# Patient Record
Sex: MALE | Race: ASIAN | NOT HISPANIC OR LATINO | Employment: OTHER | URBAN - METROPOLITAN AREA
[De-identification: names, ages, dates, MRNs, and addresses within clinical notes are randomized per-mention and may not be internally consistent; named-entity substitution may affect disease eponyms.]

---

## 2019-11-12 ENCOUNTER — TRANSFERRED RECORDS (OUTPATIENT)
Dept: HEALTH INFORMATION MANAGEMENT | Facility: CLINIC | Age: 59
End: 2019-11-12

## 2020-02-03 ENCOUNTER — TRANSCRIBE ORDERS (OUTPATIENT)
Dept: OTHER | Age: 60
End: 2020-02-03

## 2020-02-03 DIAGNOSIS — G62.9 POLYNEUROPATHY: Primary | ICD-10-CM

## 2020-02-08 ENCOUNTER — HEALTH MAINTENANCE LETTER (OUTPATIENT)
Age: 60
End: 2020-02-08

## 2020-02-28 ENCOUNTER — OFFICE VISIT (OUTPATIENT)
Dept: NEUROLOGY | Facility: CLINIC | Age: 60
End: 2020-02-28
Payer: COMMERCIAL

## 2020-02-28 VITALS
HEART RATE: 61 BPM | WEIGHT: 170 LBS | TEMPERATURE: 97.9 F | SYSTOLIC BLOOD PRESSURE: 106 MMHG | DIASTOLIC BLOOD PRESSURE: 60 MMHG | OXYGEN SATURATION: 98 %

## 2020-02-28 DIAGNOSIS — G95.9 CERVICAL MYELOPATHY (H): ICD-10-CM

## 2020-02-28 DIAGNOSIS — G60.8 SENSORY POLYNEUROPATHY: Primary | ICD-10-CM

## 2020-02-28 DIAGNOSIS — R73.03 PREDIABETES: ICD-10-CM

## 2020-02-28 DIAGNOSIS — Z98.890 HISTORY OF CERVICAL SPINAL SURGERY: ICD-10-CM

## 2020-02-28 LAB
ERYTHROCYTE [SEDIMENTATION RATE] IN BLOOD BY WESTERGREN METHOD: 8 MM/H (ref 0–20)
HBA1C MFR BLD: 5.6 % (ref 0–5.6)
VIT B12 SERPL-MCNC: 363 PG/ML (ref 193–986)

## 2020-02-28 PROCEDURE — 84425 ASSAY OF VITAMIN B-1: CPT | Mod: 90 | Performed by: PSYCHIATRY & NEUROLOGY

## 2020-02-28 PROCEDURE — 36415 COLL VENOUS BLD VENIPUNCTURE: CPT | Performed by: PSYCHIATRY & NEUROLOGY

## 2020-02-28 PROCEDURE — 82607 VITAMIN B-12: CPT | Performed by: PSYCHIATRY & NEUROLOGY

## 2020-02-28 PROCEDURE — 85652 RBC SED RATE AUTOMATED: CPT | Performed by: PSYCHIATRY & NEUROLOGY

## 2020-02-28 PROCEDURE — 86618 LYME DISEASE ANTIBODY: CPT | Performed by: PSYCHIATRY & NEUROLOGY

## 2020-02-28 PROCEDURE — 99000 SPECIMEN HANDLING OFFICE-LAB: CPT | Performed by: PSYCHIATRY & NEUROLOGY

## 2020-02-28 PROCEDURE — 99205 OFFICE O/P NEW HI 60 MIN: CPT | Performed by: PSYCHIATRY & NEUROLOGY

## 2020-02-28 PROCEDURE — 83921 ORGANIC ACID SINGLE QUANT: CPT | Performed by: PSYCHIATRY & NEUROLOGY

## 2020-02-28 PROCEDURE — 86038 ANTINUCLEAR ANTIBODIES: CPT | Performed by: PSYCHIATRY & NEUROLOGY

## 2020-02-28 PROCEDURE — 00000402 ZZHCL STATISTIC TOTAL PROTEIN: Performed by: PSYCHIATRY & NEUROLOGY

## 2020-02-28 PROCEDURE — 84207 ASSAY OF VITAMIN B-6: CPT | Mod: 90 | Performed by: PSYCHIATRY & NEUROLOGY

## 2020-02-28 PROCEDURE — 83036 HEMOGLOBIN GLYCOSYLATED A1C: CPT | Performed by: PSYCHIATRY & NEUROLOGY

## 2020-02-28 PROCEDURE — 84165 PROTEIN E-PHORESIS SERUM: CPT | Performed by: PSYCHIATRY & NEUROLOGY

## 2020-02-28 RX ORDER — ATENOLOL 25 MG/1
25 TABLET ORAL DAILY
COMMUNITY
Start: 2019-12-26

## 2020-02-28 RX ORDER — LISINOPRIL 10 MG/1
TABLET ORAL
COMMUNITY
Start: 2020-02-19 | End: 2022-07-23

## 2020-02-28 RX ORDER — ACETAMINOPHEN 500 MG
1000 TABLET ORAL EVERY 6 HOURS PRN
COMMUNITY
Start: 2018-12-14

## 2020-02-28 RX ORDER — SILDENAFIL 100 MG/1
TABLET, FILM COATED ORAL
COMMUNITY
Start: 2019-09-20 | End: 2022-07-23

## 2020-02-28 RX ORDER — DIVALPROEX SODIUM 250 MG/1
TABLET, EXTENDED RELEASE ORAL
COMMUNITY
Start: 2020-01-05 | End: 2022-07-23

## 2020-02-28 RX ORDER — ALLOPURINOL 300 MG/1
300 TABLET ORAL DAILY
COMMUNITY
Start: 2019-12-24

## 2020-02-28 RX ORDER — CHLORAL HYDRATE 500 MG
2 CAPSULE ORAL DAILY
COMMUNITY
Start: 2018-10-29

## 2020-02-28 RX ORDER — METHOCARBAMOL 500 MG/1
1000 TABLET, FILM COATED ORAL 2 TIMES DAILY
COMMUNITY
Start: 2020-02-12

## 2020-02-28 RX ORDER — TRAMADOL HYDROCHLORIDE 50 MG/1
100 TABLET ORAL EVERY MORNING
COMMUNITY
Start: 2020-02-18

## 2020-02-28 RX ORDER — PRAMIPEXOLE DIHYDROCHLORIDE 0.25 MG/1
0.5 TABLET ORAL 2 TIMES DAILY
COMMUNITY
Start: 2020-02-14

## 2020-02-28 RX ORDER — GABAPENTIN 300 MG/1
1200 CAPSULE ORAL EVERY MORNING
COMMUNITY
Start: 2020-02-12

## 2020-02-28 SDOH — HEALTH STABILITY: MENTAL HEALTH: HOW OFTEN DO YOU HAVE A DRINK CONTAINING ALCOHOL?: NEVER
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
## 2020-02-28 NOTE — PATIENT INSTRUCTIONS
AFTER VISIT SUMMARY (AVS):    At today's visit we thoroughly discussed current symptoms, EMG results (polyneuropathy), additional evaluation, and the plan, which includes:  Orders Placed This Encounter   Procedures     Lyme Disease Ashley with reflex to WB Serum     Vitamin B12     Vitamin B1 whole blood     Vitamin B6     Methylmalonic Acid     Anti Nuclear Ashley IgG by IFA with Reflex     Hemoglobin A1c     Erythrocyte sedimentation rate auto     Protein electrophoresis     The gabapentin dose could be further increased under the guidance of your pain clinic provider if felt necessary.     Additional recommendations after the work-up.    Next follow-up appointment is on as needed basis.    Please do not hesitate to call me with any questions or concerns.    Thanks.
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
## 2020-03-02 LAB
ALBUMIN SERPL ELPH-MCNC: 4.5 G/DL (ref 3.7–5.1)
ALPHA1 GLOB SERPL ELPH-MCNC: 0.3 G/DL (ref 0.2–0.4)
ALPHA2 GLOB SERPL ELPH-MCNC: 0.7 G/DL (ref 0.5–0.9)
ANA SER QL IF: NEGATIVE
B BURGDOR IGG+IGM SER QL: 0.08 (ref 0–0.89)
B-GLOBULIN SERPL ELPH-MCNC: 1.1 G/DL (ref 0.6–1)
GAMMA GLOB SERPL ELPH-MCNC: 1.3 G/DL (ref 0.7–1.6)
M PROTEIN SERPL ELPH-MCNC: 0 G/DL
PROT PATTERN SERPL ELPH-IMP: ABNORMAL
VIT B6 SERPL-MCNC: 123 NMOL/L (ref 20–125)

## 2020-03-03 LAB — VIT B1 BLD-MCNC: 173 NMOL/L (ref 70–180)

## 2020-03-05 LAB — METHYLMALONATE SERPL-SCNC: 0.46 UMOL/L (ref 0–0.4)

## 2020-11-07 ENCOUNTER — HEALTH MAINTENANCE LETTER (OUTPATIENT)
Age: 60
End: 2020-11-07

## 2021-03-27 ENCOUNTER — HEALTH MAINTENANCE LETTER (OUTPATIENT)
Age: 61
End: 2021-03-27

## 2021-05-31 ENCOUNTER — RECORDS - HEALTHEAST (OUTPATIENT)
Dept: ADMINISTRATIVE | Facility: CLINIC | Age: 61
End: 2021-05-31

## 2021-06-02 ENCOUNTER — RECORDS - HEALTHEAST (OUTPATIENT)
Dept: ADMINISTRATIVE | Facility: CLINIC | Age: 61
End: 2021-06-02

## 2021-06-16 PROBLEM — F32.A DEPRESSION, UNSPECIFIED DEPRESSION TYPE: Status: ACTIVE | Noted: 2019-08-07

## 2021-09-05 ENCOUNTER — HEALTH MAINTENANCE LETTER (OUTPATIENT)
Age: 61
End: 2021-09-05

## 2022-04-17 ENCOUNTER — HEALTH MAINTENANCE LETTER (OUTPATIENT)
Age: 62
End: 2022-04-17

## 2022-06-04 ENCOUNTER — NURSE TRIAGE (OUTPATIENT)
Dept: NURSING | Facility: CLINIC | Age: 62
End: 2022-06-04
Payer: COMMERCIAL

## 2022-06-04 NOTE — TELEPHONE ENCOUNTER
Pt was seen at ED for Priapism and states that he was given an injection    Pt states that he is still having an erection and that he is in a lot of pain     Per protocol - Go to ED now     Care advice given per protocol and when to call back. Pt verbalized understanding and agrees to plan of care.    Tracee Tam RN  [place] Nurse Advisor  3:44 AM 6/4/2022      COVID 19 Nurse Triage Plan/Patient Instructions    Please be aware that novel coronavirus (COVID-19) may be circulating in the community. If you develop symptoms such as fever, cough, or SOB or if you have concerns about the presence of another infection including coronavirus (COVID-19), please contact your health care provider or visit [email or web address].     Disposition/Instructions    ED Visit recommended. Follow protocol based instructions.     Bring Your Own Device:  Please also bring your smart device(s) (smart phones, tablets, laptops) and their charging cables for your personal use and to communicate with your care team during your visit.    Thank you for taking steps to prevent the spread of this virus.  o Limit your contact with others.  o Wear a simple mask to cover your cough.  o Wash your hands well and often.    Resources    [facility]: About COVID-19: [email or web address]    CDC: What to Do If You're Sick: www.cdc.gov/coronavirus/2019-ncov/about/steps-when-sick.html    CDC: Ending Home Isolation: www.cdc.gov/coronavirus/2019-ncov/hcp/disposition-in-home-patients.html     CDC: Caring for Someone: www.cdc.gov/coronavirus/2019-ncov/if-you-are-sick/care-for-someone.html     [facility]: Interim Guidance for Hospital Discharge to Home: www.health.[facility].[place]/diseases/coronavirus/hcp/hospdischarge.pdf    [facility] clinical trials (COVID-19 research studies): [email or web address].[facility]/umn-clinical-trials     Below are the COVID-19 hotlines at the [place] Department of Health ([facility]). Interpreters are available.    o For health questions: Call 409-612-2282 or 1-473.124.7211 (7 a.m. to 7 p.m.)  o For questions about schools and childcare: Call 042-023-0512 or 1-812.858.8660 (7 a.m. to 7 p.m.)                     Reason for Disposition    [1] Prolonged unwanted erection (i.e., not related to sexual interest) AND [2] lasting > 4 hours    Additional Information    Negative: Followed a genital area injury    Negative: Pain or burning with passing urine is main symptom    Negative: Pain in scrotum or testicle is main symptom    Negative: Swollen scrotum OR lump in the scrotum/groin area    Negative: Pubic lice suspected    Negative: [1] Unable to urinate (or only a few drops) > 4 hours AND [2] bladder feels very full (e.g., palpable bladder or strong urge to urinate)    Negative: [1] Looks infected (e.g., draining sore, ulcer, rash is painful to touch) AND [2] fever > 100.4 F (38.0 C)    Negative: [1] Not circumcised AND [2] foreskin pulled back and stuck    Negative: [1] Blood from end of penis AND [2] large amount    Protocols used: PENIS AND SCROTUM SYMPTOMS-A-AH

## 2022-07-14 ENCOUNTER — HOSPITAL ENCOUNTER (EMERGENCY)
Facility: HOSPITAL | Age: 62
End: 2022-07-14
Payer: COMMERCIAL

## 2022-07-22 ENCOUNTER — APPOINTMENT (OUTPATIENT)
Dept: RADIOLOGY | Facility: HOSPITAL | Age: 62
End: 2022-07-22
Attending: EMERGENCY MEDICINE
Payer: COMMERCIAL

## 2022-07-22 ENCOUNTER — APPOINTMENT (OUTPATIENT)
Dept: MRI IMAGING | Facility: HOSPITAL | Age: 62
End: 2022-07-22
Attending: EMERGENCY MEDICINE
Payer: COMMERCIAL

## 2022-07-22 ENCOUNTER — APPOINTMENT (OUTPATIENT)
Dept: CT IMAGING | Facility: HOSPITAL | Age: 62
End: 2022-07-22
Attending: EMERGENCY MEDICINE
Payer: COMMERCIAL

## 2022-07-22 ENCOUNTER — HOSPITAL ENCOUNTER (EMERGENCY)
Facility: HOSPITAL | Age: 62
Discharge: HOME | End: 2022-07-23
Attending: EMERGENCY MEDICINE | Admitting: EMERGENCY MEDICINE
Payer: COMMERCIAL

## 2022-07-22 DIAGNOSIS — M51.26 LUMBAR DISC HERNIATION: ICD-10-CM

## 2022-07-22 DIAGNOSIS — R29.898 RIGHT LEG WEAKNESS: ICD-10-CM

## 2022-07-22 DIAGNOSIS — R20.2 PARESTHESIA: ICD-10-CM

## 2022-07-22 LAB
BASOPHILS # BLD AUTO: 0 10E3/UL (ref 0–0.2)
BASOPHILS NFR BLD AUTO: 1 %
EOSINOPHIL # BLD AUTO: 0.1 10E3/UL (ref 0–0.7)
EOSINOPHIL NFR BLD AUTO: 2 %
ERYTHROCYTE [DISTWIDTH] IN BLOOD BY AUTOMATED COUNT: 14.1 % (ref 10–15)
HCT VFR BLD AUTO: 44.2 % (ref 40–53)
HGB BLD-MCNC: 14.9 G/DL (ref 13.3–17.7)
IMM GRANULOCYTES # BLD: 0 10E3/UL
IMM GRANULOCYTES NFR BLD: 0 %
LYMPHOCYTES # BLD AUTO: 2.7 10E3/UL (ref 0.8–5.3)
LYMPHOCYTES NFR BLD AUTO: 43 %
MCH RBC QN AUTO: 30.3 PG (ref 26.5–33)
MCHC RBC AUTO-ENTMCNC: 33.7 G/DL (ref 31.5–36.5)
MCV RBC AUTO: 90 FL (ref 78–100)
MONOCYTES # BLD AUTO: 0.5 10E3/UL (ref 0–1.3)
MONOCYTES NFR BLD AUTO: 9 %
NEUTROPHILS # BLD AUTO: 2.8 10E3/UL (ref 1.6–8.3)
NEUTROPHILS NFR BLD AUTO: 45 %
NRBC # BLD AUTO: 0 10E3/UL
NRBC BLD AUTO-RTO: 0 /100
PLATELET # BLD AUTO: 180 10E3/UL (ref 150–450)
RBC # BLD AUTO: 4.92 10E6/UL (ref 4.4–5.9)
WBC # BLD AUTO: 6.2 10E3/UL (ref 4–11)

## 2022-07-22 PROCEDURE — 36415 COLL VENOUS BLD VENIPUNCTURE: CPT | Performed by: EMERGENCY MEDICINE

## 2022-07-22 PROCEDURE — 72158 MRI LUMBAR SPINE W/O & W/DYE: CPT

## 2022-07-22 PROCEDURE — A9585 GADOBUTROL INJECTION: HCPCS | Performed by: EMERGENCY MEDICINE

## 2022-07-22 PROCEDURE — 96376 TX/PRO/DX INJ SAME DRUG ADON: CPT

## 2022-07-22 PROCEDURE — 255N000002 HC RX 255 OP 636: Performed by: EMERGENCY MEDICINE

## 2022-07-22 PROCEDURE — 85025 COMPLETE CBC W/AUTO DIFF WBC: CPT | Performed by: EMERGENCY MEDICINE

## 2022-07-22 PROCEDURE — 99285 EMERGENCY DEPT VISIT HI MDM: CPT | Mod: CS,25

## 2022-07-22 PROCEDURE — 250N000013 HC RX MED GY IP 250 OP 250 PS 637: Performed by: EMERGENCY MEDICINE

## 2022-07-22 PROCEDURE — 96374 THER/PROPH/DIAG INJ IV PUSH: CPT

## 2022-07-22 PROCEDURE — 80048 BASIC METABOLIC PNL TOTAL CA: CPT | Performed by: EMERGENCY MEDICINE

## 2022-07-22 PROCEDURE — 72157 MRI CHEST SPINE W/O & W/DYE: CPT

## 2022-07-22 PROCEDURE — 72131 CT LUMBAR SPINE W/O DYE: CPT

## 2022-07-22 PROCEDURE — 73502 X-RAY EXAM HIP UNI 2-3 VIEWS: CPT

## 2022-07-22 RX ORDER — GADOBUTROL 604.72 MG/ML
7 INJECTION INTRAVENOUS ONCE
Status: COMPLETED | OUTPATIENT
Start: 2022-07-22 | End: 2022-07-22

## 2022-07-22 RX ORDER — ACETAMINOPHEN 325 MG/1
975 TABLET ORAL ONCE
Status: COMPLETED | OUTPATIENT
Start: 2022-07-22 | End: 2022-07-22

## 2022-07-22 RX ADMIN — ACETAMINOPHEN 975 MG: 325 TABLET ORAL at 20:20

## 2022-07-22 RX ADMIN — GADOBUTROL 7 ML: 604.72 INJECTION INTRAVENOUS at 22:59

## 2022-07-22 NOTE — ED NOTES
ED Provider In Triage Note  [facility]  Encounter Date: Jul 22, 2022    Chief Complaint   Patient presents with     Fall         Use of Intrepreter: Yes (family In Person) - Language sign language    Brief HPI:   Torsten La is a 62 year old male presenting to the Emergency Department with a chief complaint of walking and fell onto right side of body.    July 7 spinal injection and numbness to right leg since. He fell today due to this numbness. Denies hitting head. Pain to right hip and lower back.     Denies hitting head, LOC or dizziness.    Brief Physical Exam:  BP 93/50   Pulse 80   Temp 98  F (36.7  C) (Oral)   Resp 18   Ht 1.524 m (5')   Wt 68 kg (150 lb)   SpO2 95%   BMI 29.29 kg/m    General: Non-toxic appearing  HEENT: Atraumatic  Resp: No respiratory distress  Abdomen: Non-peritoneal  Neuro: Alert, oriented , answers questions appropriately  Psych: Behavior appropriate  Musculoskeletal: +lumbar tenderness and right hip tenderness        Plan Initiated in Triage:  Orders Placed This Encounter     Lumbar spine CT w/o contrast     XR Hip Right 2-3 Views         PIT Dispo:   Return to lobby while awaiting workup and ED bed availability          Barbara Yarbrough MD on 7/22/2022 at 3:37 PM        Patient was evaluated by the Physician in Triage due to a limitation of available rooms in the Emergency Department. A plan of care was discussed based on the information obtained on the initial evaluation and patient was counseled to return back to the Emergency Department lobby after this initial evalutaiton until results were obtained or a room became available in the Emergency Department. Patient was counseled not to leave prior to receiving the results of their workup.            Barbara Yarbrough MD  07/22/22 [id number]

## 2022-07-22 NOTE — ED PROVIDER NOTES
EMERGENCY DEPARTMENT ENCOUNTER      NAME: Torsten La  AGE: 62 year old male  YOB: 1960  MRN: [id number]  EVALUATION DATE & TIME: No admission date for patient encounter.    PCP: Mejia Thompson    ED PROVIDER: Edna Urrutia M.D.      Chief Complaint   Patient presents with     Fall     FINAL IMPRESSION:  1. Right leg weakness    2. Lumbar disc herniation    3. Paresthesia      ED COURSE & MEDICAL DECISION MAKING:    Pertinent Labs & Imaging studies reviewed. (See chart for details)  ED Course as of 07/23/22 0112   Fri Jul 22, 2022   1851 Patient is a pleasant 62-year-old male who comes in today for evaluation of right leg pain and weakness and numbness that got worse after he had a fall today.  He had a spinal injection a couple of weeks ago and since then has had some difficulty with his right leg.  It was worse today and then he fell and landed on his right leg and he is having a lot of discomfort and radicular pain.  He does not really seem to have any hip pain.  It seems like it is coming from his back.  He had injection in his back for similar symptoms but he was able to ambulate until he fell today.  Now is not able to ambulate because of the pain and the reported weakness.  We will plan to get MRI imaging of the lumbar and thoracic spine to make sure that were not missing an emergent problem.  If that looks okay I think he can likely discharge home and continue to manage symptoms as well as possible.  I discussed this with him and family and they are in agreement.   Sat Jul 23, 2022   0010 Patient has some findings on the MRI that could explain that right leg weakness.  I will put a call out to neurosurgery to see if there is anything to acutely do with him.   0034 I spoke with our neurosurgeon on-call here.  Recommended IV steroids and transfer to [facility] given the new weakness and the findings on MRI.  I then went and spoke to the patient and after some time figured out that he actually  has seen a spine surgeon at [facility] spine.  He seen Dr. Núñez who did surgery on his neck in the past.  I was able to see an old MRI from a couple of months ago that did not show the findings that were there today.  They would prefer to stick with [facility] spine if possible so I will put a call out to them to see where would be the best place to try to find a bed for the patient.  It sounds like maybe [facility] or [facility].   0100 Spoke with the on-call physician with [facility] spine.  He is not able to see the images but he trusts that our neurosurgeons were making appropriate recommendations and would be happy to see the patient at [facility] or [facility] if we can find a bed over there.  That is where we are going to look.  I will sign the patient out to the overnight physician pending acceptance by hospitalist over there.   0112 There are apparently no beds at [facility] or [facility].  We are looking to see if they will take an ED to ED transfer.  Otherwise we could try [facility] if they have any beds.  Dr. Green will follow up on this.       6:44 PM I met with the patient, obtained history, performed an initial exam, and discussed options and plan for diagnostics and treatment here in the ED. PPE worn including N95 mask, surgical gloves, surgical cap.    MEDICATIONS GIVEN IN THE EMERGENCY:  Medications   dexamethasone (DECADRON) injection 4 mg (4 mg Intravenous Given 7/23/22 0028)   acetaminophen (TYLENOL) tablet 975 mg (975 mg Oral Given 7/22/22 2020)   gadobutrol (GADAVIST) injection 7 mL (7 mLs Intravenous Given 7/22/22 2259)     =================================================================    HPI    Triage Note:   Fell today and c/o pain down right leg from hip down. Pt has numbness normally. No LOC or dizziness. Has numbness since June after spinal injection of back. Leg is always numb normally. Positive PP. Seen by Dr. Yarbrough in triage. Is not on blood thinners     Triage Assessment     Row Name 07/22/22  1537       Triage Assessment (Adult)    Airway WDL WDL                  Patient information was obtained from: Patient, patient's family member    Use of : Yes (family member) - American Sign Language        Torsten La is a 62 year old male who presents by wheelchair for the evaluation of fall, back pain, numbness.    Around 12:20 PM, the patient was getting up from his chair when he accidentally fell. He reports he had an injection for his back at [facility] on 07/07 to numb his back. Since then he reports ongoing numbness to his back. Following his fall, he then took a nap and when he woke up he realized he could not stand due to increased numbness and pain, particularly to his right leg. Patient states his back pain radiates into his hip. Since symptoms onset, patient reports no change in his symptoms. He has not taken any medications for his pain.    Patient reports he has had injections in the past, but primarily for his neck. Patient had an MRI 2 months ago.    Denies fever, chills. Patient is not on blood thinners.    REVIEW OF SYSTEMS   Except as stated in the HPI all other systems reviewed and are negative.    PAST MEDICAL HISTORY:  Past Medical History:   Diagnosis Date     Gout      Hypertension        PAST SURGICAL HISTORY:  No past surgical history on file.    CURRENT MEDICATIONS:      Current Facility-Administered Medications:      dexamethasone (DECADRON) injection 4 mg, 4 mg, Intravenous, Q6H, Edna Urrutia MD, 4 mg at 07/23/22 0028    Current Outpatient Medications:      acetaminophen (TYLENOL) 500 MG tablet, Take 1,000 mg by mouth, Disp: , Rfl:      allopurinol (ZYLOPRIM) 300 MG tablet, , Disp: , Rfl:      atenolol (TENORMIN) 25 MG tablet, , Disp: , Rfl:      divalproex sodium extended-release (DEPAKOTE ER) 250 MG 24 hr tablet, , Disp: , Rfl:      gabapentin (NEURONTIN) 300 MG capsule, , Disp: , Rfl:      lisinopril (ZESTRIL) 10 MG tablet, , Disp: , Rfl:       methocarbamol (ROBAXIN) 500 MG tablet, , Disp: , Rfl:      Omega-3 1000 MG capsule, 2 tabs (2000 IU) each am., Disp: , Rfl:      pramipexole (MIRAPEX) 0.25 MG tablet, , Disp: , Rfl:      sildenafil (VIAGRA) 100 MG tablet, Take one tablet an hour before sexual activity., Disp: , Rfl:      traMADol (ULTRAM) 50 MG tablet, , Disp: , Rfl:     ALLERGIES:  Allergies   Allergen Reactions     Duloxetine      nightmares  nightmare       Fluoxetine Nausea     Other reaction(s): GI Upset     Metoprolol Headache     Atenolol ok  Stopped due to persistent headache--tolerates atenolol without problem       Sertraline Unknown     Paroxetine Other (See Comments)     Other reaction(s): Impotence  Sexual side effect  Sexual side effect         FAMILY HISTORY:  No family history on file.    SOCIAL HISTORY:   Social History     Socioeconomic History     Marital status:    Tobacco Use     Smoking status: Unknown If Ever Smoked   Substance and Sexual Activity     Alcohol use: Never     Drug use: Never   Social History Narrative    8/7/2019: Wife is bedside.       PHYSICAL EXAM    VITAL SIGNS: BP (!) 141/82   Pulse 75   Temp 98.6  F (37  C) (Oral)   Resp 16   Ht 1.524 m (5')   Wt 68 kg (150 lb)   SpO2 98%   BMI 29.29 kg/m     GENERAL: Awake, Alert, answering questions, No acute distress, Well nourished, sitting in wheelchair  HEENT: Normal cephalic, Atraumatic, bilateral external ears normal, No scleral icterus, mask in place  NECK: No obvious swelling or abnormality, No stridor  PULMONARY:Normal and symmetric breath sounds, No respiratory distress, Lungs clear to auscultation bilaterally. No wheezing  CARDIOVASCULAR: Regular rate and rhythm, Distal pulses present and normal.  ABDOMINAL: Soft, Nondistended, Nontender, No flank tenderness, No palpable masses  BACK: No tenderness.  EXTREMITIES: Moves all extremities spontaneously, warm, no edema, No major deformities, no pain with rotation of hips  NEURO: No facial droop,  normal motor function, Normal speech, decreased sensation of right leg  PSYCH: Normal mood and affect  SKIN: No rashes on visualized skin, dry, warm     LAB:  All pertinent labs reviewed and interpreted.  Results for orders placed or performed during the hospital encounter of 07/22/22   Lumbar spine CT w/o contrast    Impression    IMPRESSION:  1.  No acute fracture.   XR Hip Right 2-3 Views    Impression    IMPRESSION: No fracture. Moderate degenerative changes in both hips.    MR Lumbar Spine w/o & w Contrast    Impression    IMPRESSION:  1.  Transitional lumbosacral anatomy with 4 nonrib-bearing lumbar type vertebral bodies. The L5 vertebral body is fully sacralized.  2.  At T12-L1, there is a mild concentric disc bulge with a superimposed right lateral recess and midline disc protrusion with apparent contact of the right and midline of ventral descending cauda equina nerve roots. At this level there is moderate to   severe right lateral recess and right hemicanal stenosis.  3.  Additional multilevel degenerative changes, as above.   MR Thoracic Spine w/o & w Contrast    Impression    IMPRESSION:  1.  Multilevel degenerative changes of the thoracic spine, as above.  2.  At T12-L1, there is a mild concentric disc bulge with superimposed right paracentral protrusion and midline disc extrusion with caudal migration. At this level, there is moderate to severe right hemicanal and right lateral recess stenosis with   contact of the descending midline and right ventral cauda equina nerve roots.  3.  No abnormal spinal cord signal or intramedullary/left meningeal enhancement.     Basic metabolic panel   Result Value Ref Range    Sodium 141 136 - 145 mmol/L    Potassium 4.8 3.5 - 5.0 mmol/L    Chloride 108 (H) 98 - 107 mmol/L    Carbon Dioxide (CO2) 28 22 - 31 mmol/L    Anion Gap 5 5 - 18 mmol/L    Urea Nitrogen 16 8 - 22 mg/dL    Creatinine 1.16 0.70 - 1.30 mg/dL    Calcium 9.0 8.5 - 10.5 mg/dL    Glucose 95 70 - 125  mg/dL    GFR Estimate 72 >60 mL/min/1.73m2   CBC with platelets and differential   Result Value Ref Range    WBC Count 6.2 4.0 - 11.0 10e3/uL    RBC Count 4.92 4.40 - 5.90 10e6/uL    Hemoglobin 14.9 13.3 - 17.7 g/dL    Hematocrit 44.2 40.0 - 53.0 %    MCV 90 78 - 100 fL    MCH 30.3 26.5 - 33.0 pg    MCHC 33.7 31.5 - 36.5 g/dL    RDW 14.1 10.0 - 15.0 %    Platelet Count 180 150 - 450 10e3/uL    % Neutrophils 45 %    % Lymphocytes 43 %    % Monocytes 9 %    % Eosinophils 2 %    % Basophils 1 %    % Immature Granulocytes 0 %    NRBCs per 100 WBC 0 <1 /100    Absolute Neutrophils 2.8 1.6 - 8.3 10e3/uL    Absolute Lymphocytes 2.7 0.8 - 5.3 10e3/uL    Absolute Monocytes 0.5 0.0 - 1.3 10e3/uL    Absolute Eosinophils 0.1 0.0 - 0.7 10e3/uL    Absolute Basophils 0.0 0.0 - 0.2 10e3/uL    Absolute Immature Granulocytes 0.0 <=0.4 10e3/uL    Absolute NRBCs 0.0 10e3/uL       RADIOLOGY:  MR Lumbar Spine w/o & w Contrast   Preliminary Result   IMPRESSION:   1.  Transitional lumbosacral anatomy with 4 nonrib-bearing lumbar type vertebral bodies. The L5 vertebral body is fully sacralized.   2.  At T12-L1, there is a mild concentric disc bulge with a superimposed right lateral recess and midline disc protrusion with apparent contact of the right and midline of ventral descending cauda equina nerve roots. At this level there is moderate to    severe right lateral recess and right hemicanal stenosis.   3.  Additional multilevel degenerative changes, as above.      MR Thoracic Spine w/o & w Contrast   Preliminary Result   IMPRESSION:   1.  Multilevel degenerative changes of the thoracic spine, as above.   2.  At T12-L1, there is a mild concentric disc bulge with superimposed right paracentral protrusion and midline disc extrusion with caudal migration. At this level, there is moderate to severe right hemicanal and right lateral recess stenosis with    contact of the descending midline and right ventral cauda equina nerve roots.   3.  No  abnormal spinal cord signal or intramedullary/left meningeal enhancement.         XR Hip Right 2-3 Views   Final Result   IMPRESSION: No fracture. Moderate degenerative changes in both hips.       Lumbar spine CT w/o contrast   Final Result   IMPRESSION:   1.  No acute fracture.         I, Peter Babin, am serving as a scribe to document services personally performed by Dr. Urrutia based on my observation and the provider's statements to me. I, Edna Urrutia MD attest that Peter Babin is acting in a scribe capacity, has observed my performance of the services and has documented them in accordance with my direction.    Edna Urrutia M.D.  Emergency Medicine  [facility] EMERGENCY DEPARTMENT  [facility] [facility] [id number] [phone]  Dept: [phone]     Edna Urrutia MD  07/23/22 0110       Edna Urrutia MD  07/23/22 0112

## 2022-07-22 NOTE — ED TRIAGE NOTES
Fell today and c/o pain down right leg from hip down. Pt has numbness normally. No LOC or dizziness. Has numbness since June after spinal injection of back. Leg is always numb normally. Positive PP. Seen by Dr. Yarbrough in triage. Is not on blood thinners     Triage Assessment     Row Name 07/22/22 1537       Triage Assessment (Adult)    Airway WDL WDL

## 2022-07-23 VITALS
HEIGHT: 60 IN | TEMPERATURE: 98.6 F | BODY MASS INDEX: 29.45 KG/M2 | WEIGHT: 150 LBS | RESPIRATION RATE: 16 BRPM | HEART RATE: 92 BPM | SYSTOLIC BLOOD PRESSURE: 126 MMHG | DIASTOLIC BLOOD PRESSURE: 76 MMHG | OXYGEN SATURATION: 95 %

## 2022-07-23 LAB
ANION GAP SERPL CALCULATED.3IONS-SCNC: 5 MMOL/L (ref 5–18)
BUN SERPL-MCNC: 16 MG/DL (ref 8–22)
CALCIUM SERPL-MCNC: 9 MG/DL (ref 8.5–10.5)
CHLORIDE BLD-SCNC: 108 MMOL/L (ref 98–107)
CO2 SERPL-SCNC: 28 MMOL/L (ref 22–31)
CREAT SERPL-MCNC: 1.16 MG/DL (ref 0.7–1.3)
GFR SERPL CREATININE-BSD FRML MDRD: 72 ML/MIN/1.73M2
GLUCOSE BLD-MCNC: 95 MG/DL (ref 70–125)
POTASSIUM BLD-SCNC: 4.8 MMOL/L (ref 3.5–5)
SARS-COV-2 RNA RESP QL NAA+PROBE: NEGATIVE
SODIUM SERPL-SCNC: 141 MMOL/L (ref 136–145)

## 2022-07-23 PROCEDURE — 250N000013 HC RX MED GY IP 250 OP 250 PS 637: Performed by: EMERGENCY MEDICINE

## 2022-07-23 PROCEDURE — 250N000011 HC RX IP 250 OP 636: Performed by: EMERGENCY MEDICINE

## 2022-07-23 PROCEDURE — C9803 HOPD COVID-19 SPEC COLLECT: HCPCS

## 2022-07-23 PROCEDURE — 87635 SARS-COV-2 COVID-19 AMP PRB: CPT | Performed by: EMERGENCY MEDICINE

## 2022-07-23 RX ORDER — TRAMADOL HYDROCHLORIDE 50 MG/1
150 TABLET ORAL ONCE
Status: COMPLETED | OUTPATIENT
Start: 2022-07-23 | End: 2022-07-23

## 2022-07-23 RX ORDER — LINACLOTIDE 290 UG/1
290 CAPSULE, GELATIN COATED ORAL EVERY MORNING
COMMUNITY
Start: 2022-06-16

## 2022-07-23 RX ORDER — LEVOTHYROXINE SODIUM 25 UG/1
25 TABLET ORAL DAILY
COMMUNITY
Start: 2022-06-24

## 2022-07-23 RX ORDER — CYCLOBENZAPRINE HCL 10 MG
10 TABLET ORAL 3 TIMES DAILY PRN
Qty: 20 TABLET | Refills: 0 | Status: SHIPPED | OUTPATIENT
Start: 2022-07-23 | End: 2022-07-29

## 2022-07-23 RX ORDER — GABAPENTIN 300 MG/1
600 CAPSULE ORAL EVERY EVENING
COMMUNITY

## 2022-07-23 RX ORDER — OXYCODONE AND ACETAMINOPHEN 5; 325 MG/1; MG/1
1 TABLET ORAL EVERY 6 HOURS PRN
Qty: 12 TABLET | Refills: 0 | Status: SHIPPED | OUTPATIENT
Start: 2022-07-23 | End: 2022-07-26

## 2022-07-23 RX ORDER — LISINOPRIL 5 MG/1
5 TABLET ORAL DAILY
COMMUNITY
Start: 2022-05-23

## 2022-07-23 RX ORDER — TRAMADOL HYDROCHLORIDE 50 MG/1
150 TABLET ORAL EVERY EVENING
COMMUNITY

## 2022-07-23 RX ORDER — DIVALPROEX SODIUM 500 MG/1
500 TABLET, EXTENDED RELEASE ORAL ONCE
Status: COMPLETED | OUTPATIENT
Start: 2022-07-23 | End: 2022-07-23

## 2022-07-23 RX ORDER — OXYCODONE AND ACETAMINOPHEN 5; 325 MG/1; MG/1
1 TABLET ORAL ONCE
Status: COMPLETED | OUTPATIENT
Start: 2022-07-23 | End: 2022-07-23

## 2022-07-23 RX ORDER — GABAPENTIN 300 MG/1
600 CAPSULE ORAL ONCE
Status: COMPLETED | OUTPATIENT
Start: 2022-07-23 | End: 2022-07-23

## 2022-07-23 RX ORDER — METHOCARBAMOL 500 MG/1
1000 TABLET, FILM COATED ORAL ONCE
Status: COMPLETED | OUTPATIENT
Start: 2022-07-23 | End: 2022-07-23

## 2022-07-23 RX ORDER — DIVALPROEX SODIUM 500 MG/1
500 TABLET, EXTENDED RELEASE ORAL AT BEDTIME
COMMUNITY
Start: 2022-05-23

## 2022-07-23 RX ORDER — PRAMIPEXOLE DIHYDROCHLORIDE 0.25 MG/1
0.25 TABLET ORAL ONCE
Status: COMPLETED | OUTPATIENT
Start: 2022-07-23 | End: 2022-07-23

## 2022-07-23 RX ORDER — DEXAMETHASONE SODIUM PHOSPHATE 4 MG/ML
4 INJECTION, SOLUTION INTRA-ARTICULAR; INTRALESIONAL; INTRAMUSCULAR; INTRAVENOUS; SOFT TISSUE EVERY 6 HOURS
Status: DISCONTINUED | OUTPATIENT
Start: 2022-07-23 | End: 2022-07-23 | Stop reason: HOSPADM

## 2022-07-23 RX ADMIN — PRAMIPEXOLE DIHYDROCHLORIDE 0.25 MG: 0.25 TABLET ORAL at 07:12

## 2022-07-23 RX ADMIN — OXYCODONE HYDROCHLORIDE AND ACETAMINOPHEN 1 TABLET: 5; 325 TABLET ORAL at 10:46

## 2022-07-23 RX ADMIN — METHOCARBAMOL 1000 MG: 500 TABLET ORAL at 05:40

## 2022-07-23 RX ADMIN — DEXAMETHASONE SODIUM PHOSPHATE 4 MG: 4 INJECTION, SOLUTION INTRA-ARTICULAR; INTRALESIONAL; INTRAMUSCULAR; INTRAVENOUS; SOFT TISSUE at 00:28

## 2022-07-23 RX ADMIN — DEXAMETHASONE SODIUM PHOSPHATE 4 MG: 4 INJECTION, SOLUTION INTRA-ARTICULAR; INTRALESIONAL; INTRAMUSCULAR; INTRAVENOUS; SOFT TISSUE at 05:45

## 2022-07-23 RX ADMIN — DEXAMETHASONE SODIUM PHOSPHATE 4 MG: 4 INJECTION, SOLUTION INTRA-ARTICULAR; INTRALESIONAL; INTRAMUSCULAR; INTRAVENOUS; SOFT TISSUE at 12:29

## 2022-07-23 RX ADMIN — TRAMADOL HYDROCHLORIDE 150 MG: 50 TABLET ORAL at 05:40

## 2022-07-23 RX ADMIN — GABAPENTIN 600 MG: 300 CAPSULE ORAL at 05:48

## 2022-07-23 RX ADMIN — DIVALPROEX SODIUM 500 MG: 500 TABLET, FILM COATED, EXTENDED RELEASE ORAL at 07:11

## 2022-07-23 NOTE — PHARMACY-ADMISSION MEDICATION HISTORY
Pharmacy Note - Admission Medication History    Pertinent Provider Information: Pt is deaf and Hmong speaking, unable to confirm doses, but was able to confirm the names of all medications, doses/frequency updated based on Surescripts sig.    ______________________________________________________________________    Prior To Admission (PTA) med list completed and updated in EMR.       PTA Med List   Medication Sig Last Dose     acetaminophen (TYLENOL) 500 MG tablet Take 1,000 mg by mouth every 6 hours as needed Past Week at prn     allopurinol (ZYLOPRIM) 300 MG tablet Take 300 mg by mouth daily 7/22/2022 at Unknown time     atenolol (TENORMIN) 25 MG tablet Take 25 mg by mouth daily 7/22/2022 at Unknown time     divalproex sodium extended-release (DEPAKOTE ER) 500 MG 24 hr tablet Take 500 mg by mouth At Bedtime 7/21/2022 at Unknown time     gabapentin (NEURONTIN) 300 MG capsule Take 600 mg by mouth every evening 7/21/2022 at Unknown time     gabapentin (NEURONTIN) 300 MG capsule Take 1,200 mg by mouth every morning 7/22/2022 at Unknown time     levothyroxine (SYNTHROID/LEVOTHROID) 25 MCG tablet Take 25 mcg by mouth daily 7/22/2022 at Unknown time     LINZESS 290 MCG capsule Take 290 mcg by mouth every morning 7/22/2022 at Unknown time     lisinopril (ZESTRIL) 5 MG tablet Take 5 mg by mouth daily 7/22/2022 at Unknown time     methocarbamol (ROBAXIN) 500 MG tablet Take 1,000 mg by mouth 2 times daily 7/22/2022 at Unknown time     Omega-3 1000 MG capsule Take 2 g by mouth daily 7/22/2022 at Unknown time     pramipexole (MIRAPEX) 0.25 MG tablet Take 0.5 mg by mouth 2 times daily 7/22/2022 at Unknown time     traMADol (ULTRAM) 50 MG tablet Take 150 mg by mouth every evening 7/21/2022 at Unknown time     traMADol (ULTRAM) 50 MG tablet Take 100 mg by mouth every morning 7/22/2022 at Unknown time       Information source(s): Patient and CareEverywhere/SureScripts  Method of interview communication: in-person    Summary of  Changes to PTA Med List  New: levothyroxine, linzess  Discontinued: diclofenac, sildenafil  Changed: Added instructions to all medications    Patient was asked about OTC/herbal products specifically.  PTA med list reflects this.    In the past week, patient estimated taking medication this percent of the time:  greater than 90%.    Allergies were reviewed, assessed, and updated with the patient.      Patient does not use any multi-dose medications prior to admission.    The information provided in this note is only as accurate as the sources available at the time of the update(s).    Thank you for the opportunity to participate in the care of this patient.    Nola Brumfield  7/23/2022 11:13 AM

## 2022-07-23 NOTE — ED NOTES
Discharge paperwork, prescriptions and follow up care discussed with pt and family at bedside. Pt reports understanding and all questions at time of discussion answered to the best of my ability with help of pharmacy. Pts daughter states that the family owns a handicap accessible van and they were bringing the van and wheelchair to hospital for transport home.

## 2022-07-23 NOTE — ED NOTES
EMERGENCY DEPARTMENT SIGN OUT NOTE        ED COURSE AND MEDICAL DECISION MAKING  Patient was signed out to me by Dr Ramez Green at 5:13 AM.    In brief, Torsten La is a 62 year old male who initially presented right leg pain, weakness and numbness that worsened after a fall today. Did have a spinal injection a couple of weeks ago and since then right leg difficulty. Pain seems to come from his back. No other medical complaints at this time.     At time of sign out, disposition was pending transfer to [facility] for spine surgery care.       5:30 AM Patient is experiencing muscle spasms and is requesting his usual prescription medications.   7:00 AM Patient signed out to Dr. Ramesh MD at shift change.     FINAL IMPRESSION    1. Right leg weakness    2. Lumbar disc herniation    3. Paresthesia        ED MEDS  Medications   dexamethasone (DECADRON) injection 4 mg (4 mg Intravenous Given 7/23/22 0028)   traMADol (ULTRAM) tablet 150 mg (has no administration in time range)   methocarbamol (ROBAXIN) tablet 1,000 mg (has no administration in time range)   gabapentin (NEURONTIN) tablet 600 mg (has no administration in time range)   pramipexole (MIRAPEX) tablet 0.25 mg (has no administration in time range)   divalproex sodium extended-release (DEPAKOTE ER) 24 hr tablet 500 mg (has no administration in time range)   acetaminophen (TYLENOL) tablet 975 mg (975 mg Oral Given 7/22/22 2020)   gadobutrol (GADAVIST) injection 7 mL (7 mLs Intravenous Given 7/22/22 2259)       LAB  Labs Ordered and Resulted from Time of ED Arrival to Time of ED Departure   BASIC METABOLIC PANEL - Abnormal       Result Value    Sodium 141      Potassium 4.8      Chloride 108 (*)     Carbon Dioxide (CO2) 28      Anion Gap 5      Urea Nitrogen 16      Creatinine 1.16      Calcium 9.0      Glucose 95      GFR Estimate 72     COVID-19 VIRUS (CORONAVIRUS) BY PCR - Normal    SARS CoV2 PCR Negative     CBC WITH PLATELETS AND DIFFERENTIAL    WBC Count 6.2       RBC Count 4.92      Hemoglobin 14.9      Hematocrit 44.2      MCV 90      MCH 30.3      MCHC 33.7      RDW 14.1      Platelet Count 180      % Neutrophils 45      % Lymphocytes 43      % Monocytes 9      % Eosinophils 2      % Basophils 1      % Immature Granulocytes 0      NRBCs per 100 WBC 0      Absolute Neutrophils 2.8      Absolute Lymphocytes 2.7      Absolute Monocytes 0.5      Absolute Eosinophils 0.1      Absolute Basophils 0.0      Absolute Immature Granulocytes 0.0      Absolute NRBCs 0.0         EKG  ***    RADIOLOGY    MR Lumbar Spine w/o & w Contrast   Final Result   IMPRESSION:   1.  Transitional lumbosacral anatomy with 4 nonrib-bearing lumbar type vertebral bodies. The L5 vertebral body is fully sacralized. If surgery is planned, exact localization is recommended.   2.  At T12-L1, there is a mild concentric disc bulge with a superimposed right lateral recess protrusion and midline disc extrusion with apparent contact of the right and midline of ventral descending cauda equina nerve roots. At this level there is    moderate to severe right lateral recess and right hemicanal stenosis.   3.  At L4-L5 there is a concentric disc bulge with superimposed facet arthropathy. At this level is moderate to severe bilateral lateral recess stenosis with apparent contact of the descending bilateral L5 cauda equina nerve roots. Additionally there is    moderate left and mild to moderate right neural foraminal stenosis.   4.  Additional multilevel degenerative changes, as above.   5.  Incompletely imaged T2 hyperintense, T1 hypointense cystic lesion involving the left kidney measuring up to 7.1 cm. Consider follow-up renal ultrasound for further assessment.      MR Thoracic Spine w/o & w Contrast   Final Result   IMPRESSION:   1.  Multilevel degenerative changes of the thoracic spine, as above.   2.  At T12-L1, there is a mild concentric disc bulge with superimposed right paracentral protrusion and midline disc  extrusion with caudal migration. At this level, there is moderate to severe right hemicanal and right lateral recess stenosis with    contact of the descending midline and right ventral cauda equina nerve roots.   3.  No abnormal spinal cord signal or intramedullary/leptomeningeal enhancement.         XR Hip Right 2-3 Views   Final Result   IMPRESSION: No fracture. Moderate degenerative changes in both hips.       Lumbar spine CT w/o contrast   Final Result   IMPRESSION:   1.  No acute fracture.          DISCHARGE MEDS  New Prescriptions    No medications on file       Bee Arciniega MD  [facility] EMERGENCY DEPARTMENT  [facility] [facility] [id number] [phone]

## 2022-07-23 NOTE — PROGRESS NOTES
Neurosurgery Treatment Plan:   Reviewed patients chart  62 year old right handed male with h/o for chronic low back pain and leg pain fell today with sudden onset right leg weakness and numbness. Denies urinary incontinence presently. Per physician right leg weakness and inability to lift leg from chair good strength on left lower extremity        Images:   T12 L-1 right disc herniation with moderate spinal stenosis with moderate to severe right lateral recess and right right hemicanal stenosis      Plan:   Discussed with Dr. Eric   Recommendations for Decadron 4mg q6hrs   Transfer to [facility] vs [facility] based on bed availability      Miriam Rodríguez PA-C  [facility] Neurosurgery  O: [phone]

## 2022-07-23 NOTE — ED PROVIDER NOTES
ED SIGNOUT  Date/Time:7/23/2022 1:15 AM    Patient signed out to me by my colleague, Dr. Urrutia.  Please see their note for complete history and physical. Plan to follow up on transfer to [facility].    The creation of this record is based on the scribe s observations of the work being performed by Elise Gryler and the provider s statements to them. It was created on their behalf by Elise Gryler a trained medical scribe. This document has been checked and approved by the attending provider.      REMAINING ED WORKUP:  Transfer    Vitals:  BP (!) 150/88   Pulse 85   Temp 98.6  F (37  C) (Oral)   Resp 16   Ht 1.524 m (5')   Wt 68 kg (150 lb)   SpO2 96%   BMI 29.29 kg/m        Pertinent labs results reviewed   Results for orders placed or performed during the hospital encounter of 07/22/22   Lumbar spine CT w/o contrast    Impression    IMPRESSION:  1.  No acute fracture.   XR Hip Right 2-3 Views    Impression    IMPRESSION: No fracture. Moderate degenerative changes in both hips.    MR Lumbar Spine w/o & w Contrast    Impression    IMPRESSION:  1.  Transitional lumbosacral anatomy with 4 nonrib-bearing lumbar type vertebral bodies. The L5 vertebral body is fully sacralized. If surgery is planned, exact localization is recommended.  2.  At T12-L1, there is a mild concentric disc bulge with a superimposed right lateral recess protrusion and midline disc extrusion with apparent contact of the right and midline of ventral descending cauda equina nerve roots. At this level there is   moderate to severe right lateral recess and right hemicanal stenosis.  3.  At L4-L5 there is a concentric disc bulge with superimposed facet arthropathy. At this level is moderate to severe bilateral lateral recess stenosis with apparent contact of the descending bilateral L5 cauda equina nerve roots. Additionally there is   moderate left and mild to moderate right neural foraminal stenosis.  4.  Additional multilevel degenerative  changes, as above.  5.  Incompletely imaged T2 hyperintense, T1 hypointense cystic lesion involving the left kidney measuring up to 7.1 cm. Consider follow-up renal ultrasound for further assessment.   MR Thoracic Spine w/o & w Contrast    Impression    IMPRESSION:  1.  Multilevel degenerative changes of the thoracic spine, as above.  2.  At T12-L1, there is a mild concentric disc bulge with superimposed right paracentral protrusion and midline disc extrusion with caudal migration. At this level, there is moderate to severe right hemicanal and right lateral recess stenosis with   contact of the descending midline and right ventral cauda equina nerve roots.  3.  No abnormal spinal cord signal or intramedullary/leptomeningeal enhancement.     Basic metabolic panel   Result Value Ref Range    Sodium 141 136 - 145 mmol/L    Potassium 4.8 3.5 - 5.0 mmol/L    Chloride 108 (H) 98 - 107 mmol/L    Carbon Dioxide (CO2) 28 22 - 31 mmol/L    Anion Gap 5 5 - 18 mmol/L    Urea Nitrogen 16 8 - 22 mg/dL    Creatinine 1.16 0.70 - 1.30 mg/dL    Calcium 9.0 8.5 - 10.5 mg/dL    Glucose 95 70 - 125 mg/dL    GFR Estimate 72 >60 mL/min/1.73m2   CBC with platelets and differential   Result Value Ref Range    WBC Count 6.2 4.0 - 11.0 10e3/uL    RBC Count 4.92 4.40 - 5.90 10e6/uL    Hemoglobin 14.9 13.3 - 17.7 g/dL    Hematocrit 44.2 40.0 - 53.0 %    MCV 90 78 - 100 fL    MCH 30.3 26.5 - 33.0 pg    MCHC 33.7 31.5 - 36.5 g/dL    RDW 14.1 10.0 - 15.0 %    Platelet Count 180 150 - 450 10e3/uL    % Neutrophils 45 %    % Lymphocytes 43 %    % Monocytes 9 %    % Eosinophils 2 %    % Basophils 1 %    % Immature Granulocytes 0 %    NRBCs per 100 WBC 0 <1 /100    Absolute Neutrophils 2.8 1.6 - 8.3 10e3/uL    Absolute Lymphocytes 2.7 0.8 - 5.3 10e3/uL    Absolute Monocytes 0.5 0.0 - 1.3 10e3/uL    Absolute Eosinophils 0.1 0.0 - 0.7 10e3/uL    Absolute Basophils 0.0 0.0 - 0.2 10e3/uL    Absolute Immature Granulocytes 0.0 <=0.4 10e3/uL    Absolute NRBCs  0.0 10e3/uL   Asymptomatic COVID-19 Virus (Coronavirus) by PCR Nasopharyngeal    Specimen: Nasopharyngeal; Swab   Result Value Ref Range    SARS CoV2 PCR Negative Negative       Pertinent imaging reviewed   Please see official radiology report.  MR Lumbar Spine w/o & w Contrast   Final Result   IMPRESSION:   1.  Transitional lumbosacral anatomy with 4 nonrib-bearing lumbar type vertebral bodies. The L5 vertebral body is fully sacralized. If surgery is planned, exact localization is recommended.   2.  At T12-L1, there is a mild concentric disc bulge with a superimposed right lateral recess protrusion and midline disc extrusion with apparent contact of the right and midline of ventral descending cauda equina nerve roots. At this level there is    moderate to severe right lateral recess and right hemicanal stenosis.   3.  At L4-L5 there is a concentric disc bulge with superimposed facet arthropathy. At this level is moderate to severe bilateral lateral recess stenosis with apparent contact of the descending bilateral L5 cauda equina nerve roots. Additionally there is    moderate left and mild to moderate right neural foraminal stenosis.   4.  Additional multilevel degenerative changes, as above.   5.  Incompletely imaged T2 hyperintense, T1 hypointense cystic lesion involving the left kidney measuring up to 7.1 cm. Consider follow-up renal ultrasound for further assessment.      MR Thoracic Spine w/o & w Contrast   Final Result   IMPRESSION:   1.  Multilevel degenerative changes of the thoracic spine, as above.   2.  At T12-L1, there is a mild concentric disc bulge with superimposed right paracentral protrusion and midline disc extrusion with caudal migration. At this level, there is moderate to severe right hemicanal and right lateral recess stenosis with    contact of the descending midline and right ventral cauda equina nerve roots.   3.  No abnormal spinal cord signal or intramedullary/leptomeningeal enhancement.          XR Hip Right 2-3 Views   Final Result   IMPRESSION: No fracture. Moderate degenerative changes in both hips.       Lumbar spine CT w/o contrast   Final Result   IMPRESSION:   1.  No acute fracture.           Interventions  Medications   dexamethasone (DECADRON) injection 4 mg (4 mg Intravenous Given 7/23/22 0028)   acetaminophen (TYLENOL) tablet 975 mg (975 mg Oral Given 7/22/22 2020)   gadobutrol (GADAVIST) injection 7 mL (7 mLs Intravenous Given 7/22/22 2259)        ED Course/MDM:  1:16 AM Signout accepted from Dr. Urrutia.  Prior records were reviewed.  Diagnostics from this visit are reviewed. Pt sees Dr. Núñez with [facility]. They do not come here and we have no spine surgery capabilities, so pt requires transfer to [facility] per their request.  Pt received Decadron 4mg IV earlier by Dr. Urrutia.  1:24 AM I spoke with Dr. Jose Juan Urrutia at [facility] regarding the possibility of transferring the patient for continuing care. He states that the ED is understaffed, so they have no ability to accept the patient until 0700.  They will put pt on bed list and we should call back at 0700 to transfer ED to ED unless inpatient bed available sooner.  I've updated charge and [facility] on this plan as well.  1:34 AM I introduced myself to the patient and updated him on plans to hold him in the [facility] ED overnight.  4:00 AM Pt resting comfortably in bed.  5:03 AM Pt signed out to Dr. Arciniega pending transfer to [facility] for spine surgery care.      ED Course as of 07/23/22 0504   Fri Jul 22, 2022   1851 Patient is a pleasant 62-year-old male who comes in today for evaluation of right leg pain and weakness and numbness that got worse after he had a fall today.  He had a spinal injection a couple of weeks ago and since then has had some difficulty with his right leg.  It was worse today and then he fell and landed on his right leg and he is having a lot of discomfort and radicular pain.  He does not really seem to have any hip pain.  It  seems like it is coming from his back.  He had injection in his back for similar symptoms but he was able to ambulate until he fell today.  Now is not able to ambulate because of the pain and the reported weakness.  We will plan to get MRI imaging of the lumbar and thoracic spine to make sure that were not missing an emergent problem.  If that looks okay I think he can likely discharge home and continue to manage symptoms as well as possible.  I discussed this with him and family and they are in agreement.   Sat Jul 23, 2022   0010 Patient has some findings on the MRI that could explain that right leg weakness.  I will put a call out to neurosurgery to see if there is anything to acutely do with him.   0034 I spoke with our neurosurgeon on-call here.  Recommended IV steroids and transfer to [facility] given the new weakness and the findings on MRI.  I then went and spoke to the patient and after some time figured out that he actually has seen a spine surgeon at [facility].  He seen Dr. Núñez who did surgery on his neck in the past.  I was able to see an old MRI from a couple of months ago that did not show the findings that were there today.  They would prefer to stick with [facility] if possible so I will put a call out to them to see where would be the best place to try to find a bed for the patient.  It sounds like maybe [facility] or [facility].   0100 Spoke with the on-call physician with [facility] spine.  He is not able to see the images but he trusts that our neurosurgeons were making appropriate recommendations and would be happy to see the patient at [facility] or [facility] if we can find a bed over there.  That is where we are going to look.  I will sign the patient out to the overnight physician pending acceptance by hospitalist over there.   0112 There are apparently no beds at [facility] or [facility].  We are looking to see if they will take an ED to ED transfer.  Otherwise we could try [facility] if they have  any beds.  Dr. Green will follow up on this.           1. Right leg weakness    2. Lumbar disc herniation    3. Paresthesia        Munir Green DO  Emergency Medicine  [facility] EMERGENCY DEPARTMENT  7/22/2022     Munir Green MD  07/23/22 1450

## 2022-07-23 NOTE — DISCHARGE INSTRUCTIONS
Your case was discussed with your spine surgery team.  They would like to see you in follow-up early next week.  Bring copy of your MRI to your appointment time.  Take pain medications and muscle relaxants as prescribed for management of your discomfort.  If there is escalation to your symptoms or additional concern develop such as increasing weakness bowel or bladder incontinence increasing numbness fever or worsening symptomatology return to your nearest emergency department for repeat assessment.

## 2022-07-23 NOTE — ED NOTES
EMERGENCY DEPARTMENT SIGN OUT NOTE        ED COURSE AND MEDICAL DECISION MAKING  I did not receive sign out on this patient from previous ED clinician.  I was made aware of patient after receiving phone call from [facility] ED to clarify plan with this patient at approximately 0915AM.     In brief on review of ED documentation, Torsten La is a 62 year old male who initially presented with back pain and numbness after a fall. Around 12:20 PM, the patient was getting up from his chair when he accidentally fell. He reports he had an injection for his back at [facility] on 07/07 to numb his back. Since then he reports ongoing numbness to his back. Following his fall, he then took a nap and when he woke up he realized he could not stand due to increased numbness and pain, particularly to his right leg. Patient states his back pain radiates into his hip. Since symptoms onset, patient reports no change in his symptoms. He has not taken any medications for his pain.     10:09 AM  Please note that the patient is deaf but understands written English language so communication is done utilizing written text.  Patient in the emergency department is stable in terms of his symptomatology not reporting any significant change since his initial presentation.  Still with significant pain that seems radicular in nature.  It does radiate into the right hip.  Had negative right hip x-ray.  MRI is demonstrating findings concerning for the source of patient's pain.  There was significant confusion centered around the disposition of this patient at the time it became involved in his care.  More specifically, it was unclear if the patient was going to proceed directly to surgery at [facility] or was awaiting placement inpatient with plan for [facility] consult.  There was also question of ED to ED transfer.  Reviewing the chart discussing with the patient, we are attempting to get a hold of [facility] spine team who provided disposition  plan.  Based on review, patient would likely born at [facility] emergency department pending placement at either [facility] or [facility] depending on availability as this is the facility that the patient's spine team can Consult on his current Symptomatology and MRI Findings.  Patient is continuing to receive intravenous dexamethasone and we are continuing to monitor and manage his symptoms well working to clarify the disposition plan    10:33 AM  I spent time updating the patient and going through his home medications so we could conduct med rec and get him on his appropriate medications.  I have discussed the case with [facility].  The recommendations were if the patient was improved clinically on the dexamethasone considerations for discharge home and continued outpatient follow-up if patient was not improve then continue with plan for admission and [facility] consult inpatient at [facility].  Request was for [facility] versus [facility] as that is where they performed their surgical interventions in his 50s determined that the patient is not proving and needs surgery that would be the most appropriate facility for transfer to escalate his care.  We are confirming with [facility] that the patient is still on the list for an inpatient bed when it becomes available.  We will continue to work to manage patient's pain here in the emergency department I have updated him on all of this and he is not comfortable with any prospect of discharge due to ongoing significant symptomatology.    10:43 AM  Spoke to [facility].  They report that we will need to contact them every 2 hours to see if a bed is available.  When/if bed available then they would move forward with paging hospitalist.  I have discussed with our  the need to continue to reach out to them every 2 hours for bed availability.    12:40 PM  Had another discussion with the patient and his family has arrived I  updated them on all of the test results and the events yesterday into today.  The patient had slept well after her most recent conversation and is actually reporting significant clinical improvement to his symptoms with markedly decreased pain.  I suspect that the dexamethasone is starting to have a positive impact on some of his symptomatology.  He is requesting discharge from the emergency department.  I reviewed all of the test results and the discussions I had with the covering spine surgeon for the patient family reporting that they are comfortable discharging him home they have good support in terms of ability to help him get around and make sure he gets his medications make sure he gets to his follow-up appointment.  Per Janell there is no bed currently available so the alternative plan would be continuing to board until we could get him admitted to a facility that his spine team would be able to see and evaluate him appropriately.  I think in light of his clinical improvement the support ability from his family the patient is reasonable for discharge we will prescribe him a course of pain management muscle relaxants for assistance of his symptoms and I reviewed return precautions with him prior to discharge.    FINAL IMPRESSION    1. Right leg weakness    2. Lumbar disc herniation    3. Paresthesia        ED MEDS  Medications   dexamethasone (DECADRON) injection 4 mg (4 mg Intravenous Given 7/23/22 0545)   acetaminophen (TYLENOL) tablet 975 mg (975 mg Oral Given 7/22/22 2020)   gadobutrol (GADAVIST) injection 7 mL (7 mLs Intravenous Given 7/22/22 2259)   traMADol (ULTRAM) tablet 150 mg (150 mg Oral Given 7/23/22 0540)   methocarbamol (ROBAXIN) tablet 1,000 mg (1,000 mg Oral Given 7/23/22 0540)   gabapentin (NEURONTIN) capsule 600 mg (600 mg Oral Given 7/23/22 0548)   pramipexole (MIRAPEX) tablet 0.25 mg (0.25 mg Oral Given 7/23/22 0712)   divalproex sodium extended-release (DEPAKOTE ER) 24 hr tablet 500 mg  (500 mg Oral Given 7/23/22 0711)       LAB  Labs Ordered and Resulted from Time of ED Arrival to Time of ED Departure   BASIC METABOLIC PANEL - Abnormal       Result Value    Sodium 141      Potassium 4.8      Chloride 108 (*)     Carbon Dioxide (CO2) 28      Anion Gap 5      Urea Nitrogen 16      Creatinine 1.16      Calcium 9.0      Glucose 95      GFR Estimate 72     COVID-19 VIRUS (CORONAVIRUS) BY PCR - Normal    SARS CoV2 PCR Negative     CBC WITH PLATELETS AND DIFFERENTIAL    WBC Count 6.2      RBC Count 4.92      Hemoglobin 14.9      Hematocrit 44.2      MCV 90      MCH 30.3      MCHC 33.7      RDW 14.1      Platelet Count 180      % Neutrophils 45      % Lymphocytes 43      % Monocytes 9      % Eosinophils 2      % Basophils 1      % Immature Granulocytes 0      NRBCs per 100 WBC 0      Absolute Neutrophils 2.8      Absolute Lymphocytes 2.7      Absolute Monocytes 0.5      Absolute Eosinophils 0.1      Absolute Basophils 0.0      Absolute Immature Granulocytes 0.0      Absolute NRBCs 0.0         RADIOLOGY    MR Lumbar Spine w/o & w Contrast   Final Result   IMPRESSION:   1.  Transitional lumbosacral anatomy with 4 nonrib-bearing lumbar type vertebral bodies. The L5 vertebral body is fully sacralized. If surgery is planned, exact localization is recommended.   2.  At T12-L1, there is a mild concentric disc bulge with a superimposed right lateral recess protrusion and midline disc extrusion with apparent contact of the right and midline of ventral descending cauda equina nerve roots. At this level there is    moderate to severe right lateral recess and right hemicanal stenosis.   3.  At L4-L5 there is a concentric disc bulge with superimposed facet arthropathy. At this level is moderate to severe bilateral lateral recess stenosis with apparent contact of the descending bilateral L5 cauda equina nerve roots. Additionally there is    moderate left and mild to moderate right neural foraminal stenosis.   4.   Additional multilevel degenerative changes, as above.   5.  Incompletely imaged T2 hyperintense, T1 hypointense cystic lesion involving the left kidney measuring up to 7.1 cm. Consider follow-up renal ultrasound for further assessment.      MR Thoracic Spine w/o & w Contrast   Final Result   IMPRESSION:   1.  Multilevel degenerative changes of the thoracic spine, as above.   2.  At T12-L1, there is a mild concentric disc bulge with superimposed right paracentral protrusion and midline disc extrusion with caudal migration. At this level, there is moderate to severe right hemicanal and right lateral recess stenosis with    contact of the descending midline and right ventral cauda equina nerve roots.   3.  No abnormal spinal cord signal or intramedullary/leptomeningeal enhancement.         XR Hip Right 2-3 Views   Final Result   IMPRESSION: No fracture. Moderate degenerative changes in both hips.       Lumbar spine CT w/o contrast   Final Result   IMPRESSION:   1.  No acute fracture.          DISCHARGE MEDS  New Prescriptions    No medications on file       Tano Chandler MD  [facility] EMERGENCY DEPARTMENT  [address] [place] [id number] [phone]     Tano Chandler MD  07/23/22 7693

## 2022-10-23 ENCOUNTER — HEALTH MAINTENANCE LETTER (OUTPATIENT)
Age: 62
End: 2022-10-23

## 2023-06-01 ENCOUNTER — HEALTH MAINTENANCE LETTER (OUTPATIENT)
Age: 63
End: 2023-06-01

## 2023-12-21 NOTE — H&P (VIEW-ONLY)
[facility]      Preoperative Consultation   Torsten La   : 1960   Gender: male    Date of Encounter: 2023    Nursing Notes:   Sweta Felix  2023 12:15 PM  Sign at exiting of workspace  Torsten La is a 63 y.o. male (1960) who presents for preop evaluation undergoing PLACEMENT OF INFLATABLE PENILE PROSTHESIS  for treatment of Erectile dysfunction.    Date of Surgery: 23  Surgical Specialty: Urology  Dr. Henok Thomson Harden   [facility]urgical Facility: [facility]  Fax number:   Surgery type: outpatient  Primary Physician: Meg Tinajero         History of Present Illness   Erectile dysfunction, occurring since cervical spine surgery in 2018, not adequately managed with injection therapy.     Review of Systems   Bloating for 2 days, better now after a large bowel movement.  Remainder of complete review of systems is negative except as above.     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     Cervical myelopathy (HC) G95.9     Impingement syndrome of left shoulder M75.42     Tear of right rotator cuff M75.101     Osteoarthritis of glenohumeral joint, left M19.012     Pain management contract agreement Z02.89     Neuropathic pain of shoulder M79.2     Primary osteoarthritis of right hip M16.11     Improving weakness of right hip R29.898     Greater trochanteric bursitis, right M70.61     S/P extended cervical decompressive laminectomy encompassing the C3-C6 levels on 18 by Andrew Solorio [facility] R20.0     Possible  L5 lumbar radiculopathy, right M54.16     Right hip pain M25.551     Adjustment disorder with depressed mood F43.21     Chronic midline low back pain with right-sided sciatica M54.41, G89.29     Polyp of colon K63.5     Controlled substance agreement signed Z79.899     CKD (chronic kidney disease) stage 3, GFR 30-59 ml/min (HC) N18.30     History of colon polyps Z86.010     Fatty liver by ultrasound K76.0     Hypothyroidism (acquired) E03.9     Lumbosacral stenosis M48.07     MCI (mild cognitive impairment) G31.84     PAD (peripheral artery disease) (HC) I73.9     Controlled substance agreement signed Z79.899     BMI 30-34.9 E66.9     Chronic constipation K59.09     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     atenoloL (TENORMIN) 25 mg tablet Take 1 Tablet (25 mg) by mouth once daily.     bisacodyL (Dulcolax, bisacodyl,) 5 mg delayed release tablet Dulcolax (bisacodyl) 5 mg tablet,delayed release   5mg 1/day     cholecalciferol (VITAMIN D) 1,000 unit tablet Takes 2000 units daily since low     clobetasol 0.05% (TEMOVATE 0.05% OINTMENT) 0.05 % ointment For itchy rash near left eye, left ear     CPAP CPAP, heated humidifier, mask, headgear, filters and tubing. For home use. Pressure: 10-20   Length of Need: 99     divalproex (DEPAKOTE ER) 500 mg Extended-Release tablet Take 1 Tablet (500 mg) by mouth at bedtime.     gabapentin (NEURONTIN) 300 mg capsule TAKE 4 CAPS ORALLY EVERY MORNING AND 2 EVERY EVENING. MAY TAKE ADDITIONAL 1-2 CAPS DAILY AS NEEDED - actually taking 2 in the morning, and 3 at night.     levothyroxine (SYNTHROID) 50 mcg tablet Take 1 Tablet (50 mcg) by mouth once daily.     Linzess 290 mcg cap capsule TAKE 1 CAPSULE (290 MCG) BY MOUTH BEFORE BREAKFAST.     lisinopriL (PRINIVIL; ZESTRIL) 10 mg tablet Take 1 Tablet (10 mg) by mouth  once daily. Please see Dr. Tinajero in February or March     magnesium oxide (MAG-) 400 mg tablet magnesium oxide 400 mg (241.3 mg magnesium) tablet   400mg 1/day     methocarbamoL (ROBAXIN) 500 mg tablet TAKE 2 TABS BY MOUTH ONCE IN THE MORNING AND ONCE IN THE EVENING AS NEEDED     naloxone (Narcan) 4 mg/actuation nasal spray Inhale 1 Spray into affected nostril(s) each time if needed for Patient Diff To Arouse or Resp Rate < 8 / min. Additional doses may be given every 2 to 3 minutes until emergency medical assistance arrives.     Omega-3-DHA-EPA-Fish Oil (FISH OIL) 1,000 mg (120 mg-180 mg) cap 2 tabs (2000 IU) each am.     omeprazole (PriLOSEC OTC) 20 mg tablet 20mg 1/day     omeprazole (PRILOSEC) 20 mg capsule Take 1 capsule by mouth once daily before a meal.     polyethylene glycol (MIRALAX; GLYCOLAX) 17 g powder for solution Take 17 g by mouth or nasogastric tube once daily if needed for Constipation.     pramipexole (MIRAPEX) 0.25 mg tablet TAKE 1 TABLET BY MOUTH TWICE A DAY     traMADoL (ULTRAM) 50 mg tablet Take 1-2 tablets by mouth as needed for pain. MAX: 5 Tabs/Day. Start Use date 12/11/23-01/09/24 REFILL #1 01/10/24-02/08/24 REFILL #2 02/09/24-03/10/24     triamcinolone (ARISTOCORT; KENALOG) 0.1 % cream APPLY TOPICALLY TO AFFECTED AREA(S) 2 TIMES DAILY     No current facility-administered medications for this visit.     Medications have been reviewed by me and are current to the best of my knowledge and ability.     Allergies   Allergen Reactions     Cymbalta [Duloxetine] Other - Describe In Comment Field     nightmare     Fluoxetine GI Upset     Metoprolol Headache     Stopped due to persistent headache--tolerates atenolol without problem     Paroxetine Impotence     Sertraline *Unknown - Follow up needed     Trazodone Sleep Disturbances     Paroxetine Hcl Other - Describe In Comment Field     Sexual side effect     Past Surgical History:   . Laterality Date     CATARACT EXTRACTION W/   INTRAOCULAR LENS IMPLANT Left 10/12/2016    Phaco/PCL (John)     CATARACT EXTRACTION W/ INTRAOCULAR LENS IMPLANT Right 12/20/2021    Dr. Mclain     CIRCUMCISION  2008     colonocospy  02/2021    recheck 5 years - polyp     decompressive cervical laminectomy C3-4-5  07/12/2018     ID UNLISTED PROCEDURE EXTERNAL EAR  1978     Social History     Tobacco Use     Smoking status: Never     Passive exposure: Never     Smokeless tobacco: Never   Vaping Use     Vaping Use: Never used   Substance Use Topics     Alcohol use: Not Currently     Alcohol/week: 0.0 standard drinks of alcohol     Drug use: No     Comment: Caffeine - none     Family History   Problem Relation Age of Onset     Kidney failure Mother      Tuberculosis Father      No Known Problems Daughter      No Known Problems Son      Kidney cancer Half-Brother      Heart Disease Half-Brother        PAST DIFFICULTY WITH ANESTHESIA: None     Physical Exam   /78 (Cuff Site: Right Arm, Position: Sitting, Cuff Size: Adult Large)   Pulse 69   Ht 1.524 m (5')   Wt 76.7 kg (169 lb 1.6 oz)   SpO2 98%   BMI 33.03 kg/m   Body mass index is 33.03 kg/m .  BMI  General Appearance:  Alert, appropriate appearance for age. No acute distress.  HEENT Exam:  TM's clear, Funduscopic exam benign with sharp disc margins.  Oropharynx clear without exudates.  Neck / Thyroid Exam:  Supple, no masses, bruits, thyromegaly, or lymphadenopathy.  Chest/Respiratory Exam: Normal chest wall and respirations. Clear to auscultation.  Cardiovascular Exam: Regular rate and rhythm. S1, S2, no murmur, click, gallop, or rubs.  Gastrointestinal Exam: Positive bowel sounds, soft, non-tender, no masses or organomegaly.  Skin: no lesions with malignant features, no pallor, rash or jaundice.  Extremities:  No edema.  Pulses 2+ at dorsalis pedis  Neurologic Exam: Normal gait and speech, no tremor.  DTR's symmetric at the knees.  Psychiatric Exam: Alert and oriented, appropriate affect.  Mood appears  normal.       Assessment / Plan         The Pre-Op Tool    Recommendations      Low Risk Procedure    Cardiac History  No history of coronary artery disease    Sleep Apnea  History of obstructive sleep apnea, on CPAP           Labs  PLT within last 30 days  INR within last 30 days  Potassium within last 30 days  Creatinine within last 30 days  EKG  Not indicated  Stress Testing  Not indicated    * Testing recommendations are intended to assist, but not direct, clinical decisions.           Hold Lisinopril (Prinvil, Zestril) the morning of the procedure.  Continue taking Atenolol (Tenormin); be sure to take the morning of the procedure.  Take your usual dose of opioid pain medicine the morning of procedure  Take your other medications as usual prior to the procedure  Hold vitamins and/or supplements for 1 week prior to the procedure  Hold fish oil for 2 weeks prior to the procedure  Okay to take Acetaminophen (Tylenol) up until the procedure  Hold / avoid NSAIDs (e.g. ibuprofen, naproxen) prior to procedure: 2 days for ibuprofen (Advil) and 4 days for naproxen (Aleve).    * Medication recommendations are not intended to be exhaustive; they are limited to common medications that are potentially dangerous if incorrectly managed          Labs  * Data supports elimination of  routine  laboratory testing in favor of focused,  indicated  testing based on medical co-morbidities. A 2009 study randomized 1061 patients undergoing ambulatory, non-cataract surgery to routine or to indicated testing. Perioperative adverse events were similar (Anesthesia & Analgesia 2009;108:467-75; Anesthesiol. Clin. 2016 Mar;34(1):43-58).  EKG  * The ACC/AHA recommends against obtaining routine EKGs in patients undergoing low risk surgeries, a class IIa recommendation (JACC. 2014;64(21);e1-76).  RAAS Antagonist  * Hypotension during anesthesia is associated with continuing renin-angiotensin system (RAAS) antagonist. While it is unclear if  holding RAAS antagonists reduces postoperative complications, in most circumstances our experts recommend holding RAAS antagonist 24 hours prior to surgery. (Postgrad Med J 2011;87:472-81; Anest 2017;126:16-27; BMC Anesthesiol 2018;18:26.)     Session ID: [id number]  Endnotes and bibliography available upon request: [email or web address]      Labs: yes - will add platelets and INR  2/27/2023  WBC: 10.8     2/27/2023  RBC: 4.97  2/27/2023  HGB: 15.0  2/27/2023  HCT: 43.4   2/27/2023  MCV: 87    2/27/2023  MCH: 30.2   2/27/2023 MCHC: 34.6   2/27/2023  RDW: 15.1   2/27/2023  PLT: 224        POTASSIUM (mmol/L)   Date Value   12/20/2023 5.3 (H)          ECG: no    ASSESSMENT/PLAN:       ICD-10-CM    1. Pre-op exam  Z01.818 PLATELET COUNT     PROTIME-INR     BASIC METABOLIC PANEL      2. Erectile dysfunction, unspecified erectile dysfunction type  N52.9 Reason for surgery       3. Hypothyroidism (acquired)  E03.9 Well controlled       4. Serum potassium elevated  E87.5 BASIC METABOLIC PANEL      5. Fatty liver by ultrasound  K76.0 PROTIME-INR      6. Hepatitis B, chronic (HC)  B18.1 PROTIME-INR         7. Skin lesion of face  L98.9 AMB CONSULT TO DERMATOLOGY         The patient's active problems have been diagnostically and therapeutically optimized for planned procedure, pending recheck of a one-time abnormality of potassium level.    Electronically Signed by:   Meg Tinajero MD   12/21/2023    ADDENDUM:    POTASSIUM (mmol/L)   Date Value   12/21/2023 4.3     Potassium has normalized.  Optimized for surgery as planned.  Meg Tinajero MD................ 12/23/2023 1:00 PM

## 2023-12-26 RX ORDER — OMEPRAZOLE 20 MG/1
20 TABLET, DELAYED RELEASE ORAL DAILY
COMMUNITY

## 2023-12-26 RX ORDER — BISACODYL 5 MG/1
5 TABLET, DELAYED RELEASE ORAL DAILY
COMMUNITY

## 2023-12-26 RX ORDER — POLYETHYLENE GLYCOL 3350 17 G/17G
1 POWDER, FOR SOLUTION ORAL DAILY
COMMUNITY

## 2023-12-28 ENCOUNTER — ANESTHESIA EVENT (OUTPATIENT)
Dept: SURGERY | Facility: CLINIC | Age: 63
End: 2023-12-28
Payer: COMMERCIAL

## 2023-12-28 ENCOUNTER — HOSPITAL ENCOUNTER (OUTPATIENT)
Facility: CLINIC | Age: 63
Discharge: HOME | End: 2023-12-28
Attending: UROLOGY | Admitting: UROLOGY
Payer: COMMERCIAL

## 2023-12-28 ENCOUNTER — OFFICE VISIT (OUTPATIENT)
Dept: INTERPRETER SERVICES | Facility: CLINIC | Age: 63
End: 2023-12-28
Payer: COMMERCIAL

## 2023-12-28 ENCOUNTER — ANESTHESIA (OUTPATIENT)
Dept: SURGERY | Facility: CLINIC | Age: 63
End: 2023-12-28
Payer: COMMERCIAL

## 2023-12-28 VITALS
BODY MASS INDEX: 31.41 KG/M2 | HEIGHT: 60 IN | RESPIRATION RATE: 16 BRPM | SYSTOLIC BLOOD PRESSURE: 140 MMHG | TEMPERATURE: 98 F | DIASTOLIC BLOOD PRESSURE: 60 MMHG | HEART RATE: 66 BPM | WEIGHT: 160 LBS | OXYGEN SATURATION: 96 %

## 2023-12-28 DIAGNOSIS — N52.9 IMPOTENCE: Primary | ICD-10-CM

## 2023-12-28 PROCEDURE — 710N000012 HC RECOVERY PHASE 2, PER MINUTE: Performed by: UROLOGY

## 2023-12-28 PROCEDURE — C1813 PROSTHESIS, PENILE, INFLATAB: HCPCS | Performed by: UROLOGY

## 2023-12-28 PROCEDURE — 250N000009 HC RX 250: Performed by: UROLOGY

## 2023-12-28 PROCEDURE — 710N000010 HC RECOVERY PHASE 1, LEVEL 2, PER MIN: Performed by: UROLOGY

## 2023-12-28 PROCEDURE — 258N000003 HC RX IP 258 OP 636: Performed by: STUDENT IN AN ORGANIZED HEALTH CARE EDUCATION/TRAINING PROGRAM

## 2023-12-28 PROCEDURE — 250N000011 HC RX IP 250 OP 636: Performed by: NURSE ANESTHETIST, CERTIFIED REGISTERED

## 2023-12-28 PROCEDURE — 272N000001 HC OR GENERAL SUPPLY STERILE: Performed by: UROLOGY

## 2023-12-28 PROCEDURE — 250N000011 HC RX IP 250 OP 636: Performed by: UROLOGY

## 2023-12-28 PROCEDURE — 360N000076 HC SURGERY LEVEL 3, PER MIN: Performed by: UROLOGY

## 2023-12-28 PROCEDURE — 250N000025 HC SEVOFLURANE, PER MIN: Performed by: UROLOGY

## 2023-12-28 PROCEDURE — 278N000051 HC OR IMPLANT GENERAL: Performed by: UROLOGY

## 2023-12-28 PROCEDURE — 258N000003 HC RX IP 258 OP 636: Performed by: UROLOGY

## 2023-12-28 PROCEDURE — C1889 IMPLANT/INSERT DEVICE, NOC: HCPCS | Performed by: UROLOGY

## 2023-12-28 PROCEDURE — 250N000011 HC RX IP 250 OP 636: Performed by: NURSE PRACTITIONER

## 2023-12-28 PROCEDURE — 258N000003 HC RX IP 258 OP 636: Performed by: NURSE ANESTHETIST, CERTIFIED REGISTERED

## 2023-12-28 PROCEDURE — T1013 SIGN LANG/ORAL INTERPRETER: HCPCS | Mod: U3

## 2023-12-28 PROCEDURE — 370N000017 HC ANESTHESIA TECHNICAL FEE, PER MIN: Performed by: UROLOGY

## 2023-12-28 PROCEDURE — 999N000141 HC STATISTIC PRE-PROCEDURE NURSING ASSESSMENT: Performed by: UROLOGY

## 2023-12-28 PROCEDURE — 250N000009 HC RX 250: Performed by: NURSE ANESTHETIST, CERTIFIED REGISTERED

## 2023-12-28 DEVICE — INFLATABLE PENILE PROSTHESIS ACCESSORY KIT WITH MS PUMP
Type: IMPLANTABLE DEVICE | Site: PENIS | Status: FUNCTIONAL
Brand: AMS 700 ACCESSORY KIT

## 2023-12-28 DEVICE — INFLATABLE PENILE PROSTHESIS, PRECONNECT INFRAPUBIC, 1 PUMP TO 2 CYLINDERS, INHIBIZONE TREATED
Type: IMPLANTABLE DEVICE | Site: PENIS | Status: FUNCTIONAL
Brand: AMS 700 LGX MS PUMP

## 2023-12-28 DEVICE — AMS 700 PENILE PROSTHESIS, 1 LOW PROFILE RESERVOIR, UP TO 100 ML, INHIBIZONE TREATED
Type: IMPLANTABLE DEVICE | Site: PENIS | Status: FUNCTIONAL
Brand: CONCEAL

## 2023-12-28 DEVICE — NON-STACKABLE REAR TIP EXTENDERS FOR AMS 700 CX AND LGX CYLINDERS
Type: IMPLANTABLE DEVICE | Site: PENIS | Status: FUNCTIONAL
Brand: REAR TIP EXTENDER

## 2023-12-28 RX ORDER — LIDOCAINE HYDROCHLORIDE 10 MG/ML
INJECTION, SOLUTION INFILTRATION; PERINEURAL PRN
Status: DISCONTINUED | OUTPATIENT
Start: 2023-12-28 | End: 2023-12-28

## 2023-12-28 RX ORDER — PROPOFOL 10 MG/ML
INJECTION, EMULSION INTRAVENOUS PRN
Status: DISCONTINUED | OUTPATIENT
Start: 2023-12-28 | End: 2023-12-28

## 2023-12-28 RX ORDER — FENTANYL CITRATE 50 UG/ML
INJECTION, SOLUTION INTRAMUSCULAR; INTRAVENOUS PRN
Status: DISCONTINUED | OUTPATIENT
Start: 2023-12-28 | End: 2023-12-28

## 2023-12-28 RX ORDER — ONDANSETRON 2 MG/ML
4 INJECTION INTRAMUSCULAR; INTRAVENOUS EVERY 30 MIN PRN
Status: CANCELLED | OUTPATIENT
Start: 2023-12-28

## 2023-12-28 RX ORDER — ONDANSETRON 4 MG/1
4 TABLET, ORALLY DISINTEGRATING ORAL EVERY 30 MIN PRN
Status: DISCONTINUED | OUTPATIENT
Start: 2023-12-28 | End: 2023-12-28 | Stop reason: HOSPADM

## 2023-12-28 RX ORDER — GINSENG 100 MG
CAPSULE ORAL
Status: DISCONTINUED
Start: 2023-12-28 | End: 2023-12-28 | Stop reason: HOSPADM

## 2023-12-28 RX ORDER — ONDANSETRON 4 MG/1
4 TABLET, ORALLY DISINTEGRATING ORAL EVERY 30 MIN PRN
Status: CANCELLED | OUTPATIENT
Start: 2023-12-28

## 2023-12-28 RX ORDER — OXYCODONE HYDROCHLORIDE 5 MG/1
10 TABLET ORAL
Status: CANCELLED | OUTPATIENT
Start: 2023-12-28

## 2023-12-28 RX ORDER — GINSENG 100 MG
CAPSULE ORAL PRN
Status: DISCONTINUED | OUTPATIENT
Start: 2023-12-28 | End: 2023-12-28 | Stop reason: HOSPADM

## 2023-12-28 RX ORDER — HYDROMORPHONE HCL IN WATER/PF 6 MG/30 ML
0.4 PATIENT CONTROLLED ANALGESIA SYRINGE INTRAVENOUS EVERY 5 MIN PRN
Status: DISCONTINUED | OUTPATIENT
Start: 2023-12-28 | End: 2023-12-28 | Stop reason: HOSPADM

## 2023-12-28 RX ORDER — OXYCODONE HYDROCHLORIDE 5 MG/1
5 TABLET ORAL
Status: CANCELLED | OUTPATIENT
Start: 2023-12-28

## 2023-12-28 RX ORDER — SODIUM CHLORIDE, SODIUM LACTATE, POTASSIUM CHLORIDE, CALCIUM CHLORIDE 600; 310; 30; 20 MG/100ML; MG/100ML; MG/100ML; MG/100ML
INJECTION, SOLUTION INTRAVENOUS CONTINUOUS
Status: DISCONTINUED | OUTPATIENT
Start: 2023-12-28 | End: 2023-12-28 | Stop reason: HOSPADM

## 2023-12-28 RX ORDER — CEFAZOLIN SODIUM/WATER 2 G/20 ML
2 SYRINGE (ML) INTRAVENOUS
Status: COMPLETED | OUTPATIENT
Start: 2023-12-28 | End: 2023-12-28

## 2023-12-28 RX ORDER — MAGNESIUM HYDROXIDE 1200 MG/15ML
LIQUID ORAL PRN
Status: DISCONTINUED | OUTPATIENT
Start: 2023-12-28 | End: 2023-12-28 | Stop reason: HOSPADM

## 2023-12-28 RX ORDER — HYDROMORPHONE HCL IN WATER/PF 6 MG/30 ML
0.2 PATIENT CONTROLLED ANALGESIA SYRINGE INTRAVENOUS EVERY 5 MIN PRN
Status: DISCONTINUED | OUTPATIENT
Start: 2023-12-28 | End: 2023-12-28 | Stop reason: HOSPADM

## 2023-12-28 RX ORDER — FENTANYL CITRATE 50 UG/ML
50 INJECTION, SOLUTION INTRAMUSCULAR; INTRAVENOUS EVERY 5 MIN PRN
Status: DISCONTINUED | OUTPATIENT
Start: 2023-12-28 | End: 2023-12-28 | Stop reason: HOSPADM

## 2023-12-28 RX ORDER — HYDROMORPHONE HYDROCHLORIDE 2 MG/1
2 TABLET ORAL EVERY 6 HOURS PRN
Qty: 10 TABLET | Refills: 0 | Status: SHIPPED | OUTPATIENT
Start: 2023-12-28 | End: 2023-12-31

## 2023-12-28 RX ORDER — SODIUM CHLORIDE, SODIUM LACTATE, POTASSIUM CHLORIDE, CALCIUM CHLORIDE 600; 310; 30; 20 MG/100ML; MG/100ML; MG/100ML; MG/100ML
INJECTION, SOLUTION INTRAVENOUS CONTINUOUS PRN
Status: DISCONTINUED | OUTPATIENT
Start: 2023-12-28 | End: 2023-12-28

## 2023-12-28 RX ORDER — LIDOCAINE 40 MG/G
CREAM TOPICAL
Status: DISCONTINUED | OUTPATIENT
Start: 2023-12-28 | End: 2023-12-28 | Stop reason: HOSPADM

## 2023-12-28 RX ORDER — SULFAMETHOXAZOLE/TRIMETHOPRIM 800-160 MG
1 TABLET ORAL 2 TIMES DAILY
Qty: 14 TABLET | Refills: 0 | Status: SHIPPED | OUTPATIENT
Start: 2023-12-28

## 2023-12-28 RX ORDER — ONDANSETRON 2 MG/ML
4 INJECTION INTRAMUSCULAR; INTRAVENOUS EVERY 30 MIN PRN
Status: DISCONTINUED | OUTPATIENT
Start: 2023-12-28 | End: 2023-12-28 | Stop reason: HOSPADM

## 2023-12-28 RX ORDER — ONDANSETRON 2 MG/ML
INJECTION INTRAMUSCULAR; INTRAVENOUS PRN
Status: DISCONTINUED | OUTPATIENT
Start: 2023-12-28 | End: 2023-12-28

## 2023-12-28 RX ORDER — CEFAZOLIN SODIUM/WATER 2 G/20 ML
2 SYRINGE (ML) INTRAVENOUS SEE ADMIN INSTRUCTIONS
Status: DISCONTINUED | OUTPATIENT
Start: 2023-12-28 | End: 2023-12-28 | Stop reason: HOSPADM

## 2023-12-28 RX ORDER — DEXAMETHASONE SODIUM PHOSPHATE 10 MG/ML
INJECTION, SOLUTION INTRAMUSCULAR; INTRAVENOUS PRN
Status: DISCONTINUED | OUTPATIENT
Start: 2023-12-28 | End: 2023-12-28

## 2023-12-28 RX ORDER — OXYCODONE HYDROCHLORIDE 5 MG/1
5-10 TABLET ORAL EVERY 4 HOURS PRN
Qty: 10 TABLET | Refills: 0 | Status: SHIPPED | OUTPATIENT
Start: 2023-12-28 | End: 2023-12-28

## 2023-12-28 RX ORDER — FENTANYL CITRATE 50 UG/ML
25 INJECTION, SOLUTION INTRAMUSCULAR; INTRAVENOUS EVERY 5 MIN PRN
Status: DISCONTINUED | OUTPATIENT
Start: 2023-12-28 | End: 2023-12-28 | Stop reason: HOSPADM

## 2023-12-28 RX ADMIN — PROPOFOL 200 MG: 10 INJECTION, EMULSION INTRAVENOUS at 13:43

## 2023-12-28 RX ADMIN — Medication 2 G: at 13:34

## 2023-12-28 RX ADMIN — GENTAMICIN SULFATE 360 MG: 40 INJECTION, SOLUTION INTRAMUSCULAR; INTRAVENOUS at 13:09

## 2023-12-28 RX ADMIN — PHENYLEPHRINE HYDROCHLORIDE 100 MCG: 10 INJECTION INTRAVENOUS at 14:13

## 2023-12-28 RX ADMIN — SODIUM CHLORIDE, POTASSIUM CHLORIDE, SODIUM LACTATE AND CALCIUM CHLORIDE: 600; 310; 30; 20 INJECTION, SOLUTION INTRAVENOUS at 13:23

## 2023-12-28 RX ADMIN — DEXAMETHASONE SODIUM PHOSPHATE 10 MG: 10 INJECTION, SOLUTION INTRAMUSCULAR; INTRAVENOUS at 14:07

## 2023-12-28 RX ADMIN — LIDOCAINE HYDROCHLORIDE 50 MG: 10 INJECTION, SOLUTION INFILTRATION; PERINEURAL at 13:42

## 2023-12-28 RX ADMIN — PHENYLEPHRINE HYDROCHLORIDE 100 MCG: 10 INJECTION INTRAVENOUS at 14:01

## 2023-12-28 RX ADMIN — PHENYLEPHRINE HYDROCHLORIDE 100 MCG: 10 INJECTION INTRAVENOUS at 14:14

## 2023-12-28 RX ADMIN — PHENYLEPHRINE HYDROCHLORIDE 0.3 MCG/KG/MIN: 10 INJECTION INTRAVENOUS at 14:19

## 2023-12-28 RX ADMIN — ONDANSETRON 4 MG: 2 INJECTION INTRAMUSCULAR; INTRAVENOUS at 14:38

## 2023-12-28 RX ADMIN — SODIUM CHLORIDE, POTASSIUM CHLORIDE, SODIUM LACTATE AND CALCIUM CHLORIDE: 600; 310; 30; 20 INJECTION, SOLUTION INTRAVENOUS at 14:23

## 2023-12-28 RX ADMIN — PHENYLEPHRINE HYDROCHLORIDE 100 MCG: 10 INJECTION INTRAVENOUS at 14:07

## 2023-12-28 RX ADMIN — MIDAZOLAM 2 MG: 1 INJECTION INTRAMUSCULAR; INTRAVENOUS at 13:31

## 2023-12-28 RX ADMIN — SODIUM CHLORIDE, POTASSIUM CHLORIDE, SODIUM LACTATE AND CALCIUM CHLORIDE 1000 ML: 600; 310; 30; 20 INJECTION, SOLUTION INTRAVENOUS at 11:42

## 2023-12-28 RX ADMIN — PHENYLEPHRINE HYDROCHLORIDE 100 MCG: 10 INJECTION INTRAVENOUS at 13:58

## 2023-12-28 RX ADMIN — FENTANYL CITRATE 100 MCG: 50 INJECTION INTRAMUSCULAR; INTRAVENOUS at 13:42

## 2023-12-28 ASSESSMENT — ACTIVITIES OF DAILY LIVING (ADL)
ADLS_ACUITY_SCORE: 22

## 2023-12-28 ASSESSMENT — ENCOUNTER SYMPTOMS: SEIZURES: 0

## 2023-12-28 NOTE — ANESTHESIA PROCEDURE NOTES
Airway       Patient location during procedure: OR  Staff -        CRNA: Benji Oliveira APRN CRNA       Performed By: CRNA  Consent for Airway        Urgency: elective  Indications and Patient Condition       Indications for airway management: breana-procedural       Induction type:intravenous       Mask difficulty assessment: 0 - not attempted    Final Airway Details       Final airway type: supraglottic airway    Supraglottic Airway Details        Type: LMA       Brand: Ambu AuraGain       LMA size: 4    Post intubation assessment        Placement verified by: capnometry, equal breath sounds and chest rise        Number of attempts at approach: 1       Number of other approaches attempted: 0       Secured with: tape       Ease of procedure: easy       Dentition: Intact and Unchanged

## 2023-12-28 NOTE — INTERVAL H&P NOTE
I have reviewed the surgical (or preoperative) H&P that is linked to this encounter, and examined the patient. There are no significant changes    Henok Harden MD    Clinical Conditions Present on Arrival:  Clinically Significant Risk Factors Present on Admission                      # Obesity: Estimated body mass index is 31.25 kg/m  as calculated from the following:    Height as of this encounter: 1.524 m (5').    Weight as of this encounter: 72.6 kg (160 lb).

## 2023-12-28 NOTE — ANESTHESIA PREPROCEDURE EVALUATION
Anesthesia Pre-Procedure Evaluation    Patient: Torsten La   MRN: [id number] : 1960        Procedure : Procedure(s):  PLACEMENT OF INFLATABLE PENILE PROSTHESIS          Past Medical History:   Diagnosis Date     Gastroesophageal reflux disease      Gout      Hypertension      Sleep apnea       History reviewed. No pertinent surgical history.   Allergies   Allergen Reactions     Duloxetine      nightmares       Fluoxetine Nausea     Other reaction(s): GI Upset     Metoprolol Headache     Atenolol ok  Stopped due to persistent headache--tolerates atenolol without problem       Sertraline Unknown     Paroxetine Other (See Comments)     Other reaction(s): Impotence        Social History     Tobacco Use     Smoking status: Unknown     Smokeless tobacco: Not on file   Substance Use Topics     Alcohol use: Not Currently     Comment: small amount over Yeyo      Wt Readings from Last 1 Encounters:   23 72.6 kg (160 lb)        Anesthesia Evaluation            ROS/MED HX  ENT/Pulmonary:     (+) sleep apnea,                                       Neurologic:    (-) no seizures and no CVA   Cardiovascular:     (+)  hypertension- -   -  - -                                   (-) taking anticoagulants/antiplatelets and stent   METS/Exercise Tolerance:     Hematologic:       Musculoskeletal:       GI/Hepatic:     (+) GERD,                   Renal/Genitourinary:       Endo:    (-) Type II DM   Psychiatric/Substance Use:       Infectious Disease:       Malignancy:       Other:          Physical Exam    Airway        Mallampati: II   TM distance: > 3 FB   Neck ROM: full     Respiratory Devices and Support         Dental       (+) Minor Abnormalities - some fillings, tiny chips      Cardiovascular   cardiovascular exam normal          Pulmonary   pulmonary exam normal            OUTSIDE LABS:  CBC:   Lab Results   Component Value Date    WBC 6.2 2022    WBC 6.7 2021    HGB 14.9 2022    HGB 15.0  "04/06/2021    HCT 44.2 07/22/2022    HCT 45.2 04/06/2021     07/22/2022     04/06/2021     BMP:   Lab Results   Component Value Date     07/22/2022     04/06/2021    POTASSIUM 4.8 07/22/2022    POTASSIUM 4.4 04/06/2021    CHLORIDE 108 (H) 07/22/2022    CHLORIDE 108 (H) 04/06/2021    CO2 28 07/22/2022    CO2 27 04/06/2021    BUN 16 07/22/2022    BUN 11 04/06/2021    CR 1.16 07/22/2022    CR 1.21 04/06/2021    GLC 95 07/22/2022     04/06/2021     COAGS: No results found for: \"PTT\", \"INR\", \"FIBR\"  POC: No results found for: \"BGM\", \"HCG\", \"HCGS\"  HEPATIC:   Lab Results   Component Value Date    ALBUMIN 3.7 04/06/2021    PROTTOTAL 7.5 04/06/2021    ALT 21 04/06/2021    AST 37 04/06/2021    ALKPHOS 105 04/06/2021    BILITOTAL 0.3 04/06/2021     OTHER:   Lab Results   Component Value Date    LACT 1.4 04/06/2018    A1C 5.6 02/28/2020    JOHAN 9.0 07/22/2022    LIPASE 68 (H) 04/06/2021    TSH 3.14 04/06/2018    SED 8 02/28/2020       Anesthesia Plan    ASA Status:  3       Anesthesia Type: General.     - Airway: LMA              Consents    Anesthesia Plan(s) and associated risks, benefits, and realistic alternatives discussed. Questions answered and patient/representative(s) expressed understanding.     - Discussed: Risks, Benefits and Alternatives for BOTH SEDATION and the PROCEDURE were discussed     - Discussed with:  Patient      - Extended Intubation/Ventilatory Support Discussed: No.      Use of blood products discussed: No .     Postoperative Care    Pain management: IV analgesics, Oral pain medications.   PONV prophylaxis: Ondansetron (or other 5HT-3), Dexamethasone or Solumedrol     Comments:             Avelino Alfredo, DO    I have reviewed the pertinent notes and labs in the chart from the past 30 days and (re)examined the patient.  Any updates or changes from those notes are reflected in this note.              # Obesity: Estimated body mass index is 31.25 kg/m  as " calculated from the following:    Height as of this encounter: 1.524 m (5').    Weight as of this encounter: 72.6 kg (160 lb).

## 2023-12-28 NOTE — OR NURSING
Trevizo catheter may be removed tomorrow by patient per Dr. Harden. Instructions given and demonstrated. 10cc syringe given. Instructed patient to call if he had any questions prior to removing catheter.   Iram Vang RN

## 2023-12-28 NOTE — ANESTHESIA POSTPROCEDURE EVALUATION
Patient: Torsten La    Procedure: Procedure(s):  PLACEMENT OF INFLATABLE PENILE PROSTHESIS       Anesthesia Type:  General    Note:  Disposition: Outpatient   Postop Pain Control: Uneventful            Sign Out: Well controlled pain   PONV: No   Neuro/Psych: Uneventful            Sign Out: Acceptable/Baseline neuro status   Airway/Respiratory: Uneventful            Sign Out: Acceptable/Baseline resp. status   CV/Hemodynamics: Uneventful            Sign Out: Acceptable CV status; No obvious hypovolemia; No obvious fluid overload   Other NRE: NONE   DID A NON-ROUTINE EVENT OCCUR? No    Event details/Postop Comments:  Patient recovering comfortably.        Last vitals:  Vitals Value Taken Time   /75 12/28/23 1530   Temp 36.6  C (97.9  F) 12/28/23 1520   Pulse 76 12/28/23 1530   Resp 21 12/28/23 1530   SpO2 94 % 12/28/23 1530       Electronically Signed By: Avelino Alfredo DO  December 28, 2023  4:28 PM

## 2023-12-28 NOTE — ANESTHESIA CARE TRANSFER NOTE
Patient: Torsten La    Procedure: Procedure(s):  PLACEMENT OF INFLATABLE PENILE PROSTHESIS       Diagnosis: Erectile dysfunction [N52.9]  Diagnosis Additional Information: No value filed.    Anesthesia Type:   General     Note:    Oropharynx: oropharynx clear of all foreign objects and spontaneously breathing  Level of Consciousness: drowsy  Oxygen Supplementation: face mask  Level of Supplemental Oxygen (L/min / FiO2): 8  Independent Airway: airway patency satisfactory and stable  Dentition: dentition unchanged  Vital Signs Stable: post-procedure vital signs reviewed and stable  Report to RN Given: handoff report given  Patient transferred to: PACU    Handoff Report: Identifed the Patient, Identified the Reponsible Provider, Reviewed the pertinent medical history, Discussed the surgical course, Reviewed Intra-OP anesthesia mangement and issues during anesthesia, Set expectations for post-procedure period and Allowed opportunity for questions and acknowledgement of understanding    Vitals:  Vitals Value Taken Time   /71 12/28/23 1506   Temp 37.4  C (99.3  F) 12/28/23 1506   Pulse 72 12/28/23 1506   Resp 16 12/28/23 1506   SpO2 100 % 12/28/23 1506       Electronically Signed By: HOMERO Lucia CRNA  December 28, 2023  3:07 PM

## 2023-12-28 NOTE — OP NOTE
Date of surgery: 12/28/2023  Location:[facility] Main OR    Surgeon: Henok Harden MD    Anesthesia: General    Preoperative diagnosis: Erectile dysfunction organic     Postoperative diagnosis: Erect Dysfunciton Organic     Procedure: 3 piece penile prothesis Infrapubic approach    Operative indication: 63 year old male with organic erectile dysfunction     Operative findings: AMS [employer] LGX15+2cm rear tip 100 cc conceal reservoir through the left inguinal ring    Operative procedure: The patient was brought to the operating room.  General Anesthesia. Supine fleed postion    Infrapubic is made and carried down through the subcutaneous tissue to expose the corpora.  Michael retractor is placed.    Corporotomies made between 2-0 Vicryl stay suture.    Corporal dilation proceeds using the proximal dilators without complication to a 12 mm diameter.    Measurements 8 proximal and 9 distal bilateral.  A 17 cm lgx device is chosen and prepped on the back table.  The cylinders were placed without complication and the corporotomies closed using the 2-0 Vicryl suture.  Test inflation using a syringe reservoir shows good fit and function of the device within the corporal bodies.      Estimated Magic pump  full inflation is 15 pumps    Copious antibiotic irrigation is used for the corpora.    A 100 cc conceal reservoir was placed left inguinal ring    The preconnected pump is placed within a dependent portion in the scrotum.    The pump and the reservoir tubing are connected using a straight connector    The device is cycled and fits and functions well.    Copious antibiotic irrigation then closure of the incision using layered Vicryl.  Dressing with skin glue on the incision and fluff gauze and mesh underwear are placed.  The Che is placed to straight drainage.    He is awoken from anesthesia and transferred recovery stable      Drains: 16 Maori che     Specimens: none     Estimated blood loss: 25 ml      Complications: none    Heonk Harden MD

## 2024-01-01 ENCOUNTER — HOSPITAL ENCOUNTER (EMERGENCY)
Facility: CLINIC | Age: 64
Discharge: HOME | End: 2024-01-01
Attending: EMERGENCY MEDICINE | Admitting: EMERGENCY MEDICINE
Payer: COMMERCIAL

## 2024-01-01 VITALS
DIASTOLIC BLOOD PRESSURE: 68 MMHG | SYSTOLIC BLOOD PRESSURE: 123 MMHG | TEMPERATURE: 98.3 F | OXYGEN SATURATION: 97 % | HEART RATE: 93 BPM | RESPIRATION RATE: 28 BRPM

## 2024-01-01 DIAGNOSIS — N48.89 PENILE SWELLING: ICD-10-CM

## 2024-01-01 DIAGNOSIS — R33.9 URINARY RETENTION: ICD-10-CM

## 2024-01-01 PROCEDURE — 250N000009 HC RX 250: Performed by: EMERGENCY MEDICINE

## 2024-01-01 PROCEDURE — 99283 EMERGENCY DEPT VISIT LOW MDM: CPT | Mod: 25

## 2024-01-01 PROCEDURE — 51702 INSERT TEMP BLADDER CATH: CPT

## 2024-01-01 RX ORDER — LIDOCAINE HYDROCHLORIDE 20 MG/ML
10 JELLY TOPICAL ONCE
Status: COMPLETED | OUTPATIENT
Start: 2024-01-01 | End: 2024-01-01

## 2024-01-01 RX ADMIN — LIDOCAINE HYDROCHLORIDE 5 ML: 20 JELLY TOPICAL at 03:36

## 2024-01-01 ASSESSMENT — ACTIVITIES OF DAILY LIVING (ADL): ADLS_ACUITY_SCORE: 35

## 2024-01-01 NOTE — ED PROVIDER NOTES
EMERGENCY DEPARTMENT ENCOUNTER      NAME: Torsten La  AGE: 63 year old male  YOB: 1960  MRN: [id number]  EVALUATION DATE & TIME: 1/1/2024  1:14 AM    PCP: Mejia Thompson    ED PROVIDER: Zechariah Preston M.D.      Chief Complaint   Patient presents with    Post-op Problem         FINAL IMPRESSION:  1. Urinary retention    2. Penile swelling          ED COURSE & MEDICAL DECISION MAKING:    Pertinent Labs & Imaging studies reviewed. (See chart for details)  63 year old male presents to the Emergency Department for evaluation of Urinary retension and swelling of the penis and scrotum.  Patient status post penile prosthesis surgery 3 days ago.  Trevizo catheter was pulled on the 29th.  Has been doing well but has been able to urinate this evening.  Having quite a bit of swelling in his penis and scrotum.  Did discuss with urology.  Seems to be typical postoperative ecchymosis and swelling.  I do not see any signs of infection.  Will place a Trevizo catheter here.  Did have over 500 cc out and patient feeling better.  Will discharge him home and he will follow-up with his urologist this morning.  Will return for any worsening symptoms.  Discussed plan with patient and son via     1:23 AM Bladder scan done which showed 445 ml.   2:25 AM ASL present in the ED. I met with the patient to gather history and to perform my initial exam. I discussed the plan for care while in the Emergency Department.   2:52 AM I spoke with Birdie Acuna PA-C, urology.  3:02 AM I rechecked patient.   3:03 AM I spoke with Birdie Acuna PA-C, urology.  4:09 AM I rechecked and updated patient. We discussed the plan for discharge and the patient is agreeable. Reviewed supportive cares, symptomatic treatment, outpatient follow up, and reasons to return to the Emergency Department. Patient to be discharged by ED RN.     At the conclusion of the encounter I discussed the results of all of the tests and the disposition.  "The questions were answered. The patient or family acknowledged understanding and was agreeable with the care plan.     Medical Decision Making    History:  Supplemental history from: Documented in chart, if applicable  External Record(s) reviewed: Documented in chart, if applicable.    Work Up:  Chart documentation includes differential considered and any EKGs or imaging independently interpreted by provider, where specified.  In additional to work up documented, I considered the following work up: Documented in chart, if applicable.    External consultation:  Discussion of management with another provider: Documented in chart, if applicable    Complicating factors:  Care impacted by chronic illness: Hypertension  Care affected by social determinants of health: N/A    Disposition considerations: Discharge. No recommendations on prescription strength medication(s). See documentation for any additional details.         MEDICATIONS GIVEN IN THE EMERGENCY:  Medications   lidocaine (XYLOCAINE) 2 % external gel 10 mL (5 mLs Urethral $Given 1/1/24 0336)       NEW PRESCRIPTIONS STARTED AT TODAY'S ER VISIT  Discharge Medication List as of 1/1/2024  4:06 AM             =================================================================    HPI    Patient information was obtained from: Patient    Use of :  at bedside        Torsten La is a 63 year old male with a pertinent history of HTN, PAD, CKD, hypothyroidism, depression, who presents to this ED for evaluation of post-op problem.    Per chart review, patient underwent placement of inflatable penile prosthesis on 12/28/23. Trevizo placed which is able to be removed on 12/29/23.    Patient reports he underwent groin surgery four days ago and was discharged home afterwards. He states that afterwards, he noticed some penile swelling and pain as well as \"black and blue\" coloration to his penis. Patient also mentions that after his " surgery, he had a che placed and took it out three days ago. After removal, he was able to urinate over the last two days. However, this morning, patient now reports some urinary retention. He states that he last urinated yesterday evening. Otherwise, he denies any fevers, nausea, and vomiting. No other complaints at this time.    PAST MEDICAL HISTORY:  Past Medical History:   Diagnosis Date    Gastroesophageal reflux disease     Gout     Hypertension     Sleep apnea        PAST SURGICAL HISTORY:  Past Surgical History:   Procedure Laterality Date    IMPLANT PROSTHESIS PENIS INFLATABLE N/A 12/28/2023    Procedure: PLACEMENT OF INFLATABLE PENILE PROSTHESIS;  Surgeon: Henok Harden MD;  Location: [facility] Main OR           CURRENT MEDICATIONS:    No current facility-administered medications for this encounter.     Current Outpatient Medications   Medication    acetaminophen (TYLENOL) 500 MG tablet    allopurinol (ZYLOPRIM) 300 MG tablet    atenolol (TENORMIN) 25 MG tablet    bisacodyl (DULCOLAX) 5 MG EC tablet    divalproex sodium extended-release (DEPAKOTE ER) 500 MG 24 hr tablet    gabapentin (NEURONTIN) 300 MG capsule    gabapentin (NEURONTIN) 300 MG capsule    levothyroxine (SYNTHROID/LEVOTHROID) 25 MCG tablet    LINZESS 290 MCG capsule    lisinopril (ZESTRIL) 5 MG tablet    methocarbamol (ROBAXIN) 500 MG tablet    Omega-3 1000 MG capsule    omeprazole (PRILOSEC OTC) 20 MG EC tablet    polyethylene glycol (MIRALAX) 17 g packet    pramipexole (MIRAPEX) 0.25 MG tablet    sulfamethoxazole-trimethoprim (BACTRIM DS) 800-160 MG tablet    traMADol (ULTRAM) 50 MG tablet    traMADol (ULTRAM) 50 MG tablet         ALLERGIES:  Allergies   Allergen Reactions    Duloxetine      nightmares      Fluoxetine Nausea     Other reaction(s): GI Upset    Metoprolol Headache     Atenolol ok  Stopped due to persistent headache--tolerates atenolol without problem      Sertraline Unknown    Paroxetine Other (See Comments)     Other  reaction(s): Impotence         FAMILY HISTORY:  History reviewed. No pertinent family history.    SOCIAL HISTORY:   Social History     Socioeconomic History    Marital status:    Tobacco Use    Smoking status: Unknown   Substance and Sexual Activity    Alcohol use: Not Currently     Comment: small amount over [place]    Drug use: Never   Social History Narrative    8/7/2019: Wife is bedside.       VITALS:  /68   Pulse 93   Temp 98.3  F (36.8  C) (Oral)   Resp 28   SpO2 97%     PHYSICAL EXAM    Physical Exam  Vitals and nursing note reviewed.   Constitutional:       General: He is not in acute distress.     Appearance: He is not diaphoretic.   HENT:      Head: Atraumatic.   Eyes:      General: No scleral icterus.     Pupils: Pupils are equal, round, and reactive to light.   Cardiovascular:      Rate and Rhythm: Normal rate and regular rhythm.      Heart sounds: Normal heart sounds.   Pulmonary:      Effort: No respiratory distress.      Breath sounds: Normal breath sounds.   Abdominal:      Palpations: Abdomen is soft.      Tenderness: There is no abdominal tenderness.   Genitourinary:     Comments: Extensive ecchymosis and swelling of the penis and the scrotum.  No warmth.  No erythema.  No active bleeding.  Musculoskeletal:         General: No tenderness.   Lymphadenopathy:      Cervical: No cervical adenopathy.   Skin:     General: Skin is warm.      Findings: No rash.           LAB:  All pertinent labs reviewed and interpreted.  Labs Ordered and Resulted from Time of ED Arrival to Time of ED Departure - No data to display    RADIOLOGY:  Reviewed all pertinent imaging. Please see official radiology report.  No orders to display           I, Mira Washington, am serving as a scribe to document services personally performed by Dr. Zechariah Preston, based on my observation and the provider's statements to me. Zechariah RODRIGUES MD attest that Mira Washington is acting in a scribe capacity, has observed my  performance of the services and has documented them in accordance with my direction.    Zechariah Preston M.D.  Emergency Medicine  [facility] EMERGENCY ROOM  [address] [address]  [phone]  Dept: [phone]       Zechariah Preston MD  01/01/24 0451

## 2024-01-01 NOTE — ED NOTES
Pt remains alert and oriented, VSS; he and son are agreeable with plan to discharge home with outpt urology follow up

## 2024-01-01 NOTE — ED NOTES
Pt tolerated catheter placement well;  available to help explain procedure and answer pt questions. Pt declines leg bag; states that he will be okay with just the main drainage collection bag.

## 2024-01-01 NOTE — ED TRIAGE NOTES
Pt presents post penile surgery; states that he has not been able to void since last night. Denies pain at this time. See following RN note and MD documentation.

## 2024-01-01 NOTE — ED NOTES
"Via written notes: pt denies pain, states that the last time he urinated was \"yesterday evening.\" He is concerned about needing a catheter placed, this writer updated him that we are waiting on urology team to call back. Son remains at bedside. Call light is within reach, pt offered and accepted warm blankets.  "

## 2024-06-08 ENCOUNTER — HEALTH MAINTENANCE LETTER (OUTPATIENT)
Age: 64
End: 2024-06-08

## 2024-10-04 ENCOUNTER — ALLIED HEALTH/NURSE VISIT (OUTPATIENT)
Dept: FAMILY MEDICINE | Facility: CLINIC | Age: 64
End: 2024-10-04
Payer: COMMERCIAL

## 2024-10-04 VITALS — TEMPERATURE: 98.4 F

## 2024-10-04 DIAGNOSIS — Z23 ENCOUNTER FOR IMMUNIZATION: Primary | ICD-10-CM

## 2024-10-04 PROCEDURE — 90480 ADMN SARSCOV2 VAC 1/ONLY CMP: CPT

## 2024-10-04 PROCEDURE — 99207 PR NO CHARGE NURSE ONLY: CPT

## 2024-10-04 PROCEDURE — G0008 ADMIN INFLUENZA VIRUS VAC: HCPCS

## 2024-10-04 PROCEDURE — 91320 SARSCV2 VAC 30MCG TRS-SUC IM: CPT

## 2024-10-04 PROCEDURE — 90673 RIV3 VACCINE NO PRESERV IM: CPT

## 2024-10-04 NOTE — PROGRESS NOTES
Prior to immunization administration, verified patients identity using patient s name and date of birth. Please see Immunization Activity for additional information.     Screening Questionnaire for Adult Immunization    Are you sick today?   No   Do you have allergies to medications, food, a vaccine component or latex?   No   Have you ever had a serious reaction after receiving a vaccination?   No   Do you have a long-term health problem with heart, lung, kidney, or metabolic disease (e.g., diabetes), asthma, a blood disorder, no spleen, complement component deficiency, a cochlear implant, or a spinal fluid leak?  Are you on long-term aspirin therapy?   No   Do you have cancer, leukemia, HIV/AIDS, or any other immune system problem?   No   Do you have a parent, brother, or sister with an immune system problem?   No   In the past 3 months, have you taken medications that affect  your immune system, such as prednisone, other steroids, or anticancer drugs; drugs for the treatment of rheumatoid arthritis, Crohn s disease, or psoriasis; or have you had radiation treatments?   No   Have you had a seizure, or a brain or other nervous system problem?   No   During the past year, have you received a transfusion of blood or blood    products, or been given immune (gamma) globulin or antiviral drug?   No   For women: Are you pregnant or is there a chance you could become       pregnant during the next month?   No   Have you received any vaccinations in the past 4 weeks?   No     Immunization questionnaire answers were all negative.    I have reviewed the following standing orders:   This patient is due and qualifies for the Covid-19 vaccine.     Click here for COVID-19 Standing Order    I have reviewed the vaccines inclusion and exclusion criteria; No concerns regarding eligibility.     Patient instructed to remain in clinic for 15 minutes afterwards, and to report any adverse reactions.     Screening performed by Yaima URIAS  EMILIA Curran on 10/4/2024 at 9:56 AM.

## 2024-10-04 NOTE — PROGRESS NOTES
Prior to immunization administration, verified patients identity using patient s name and date of birth. Please see Immunization Activity for additional information.     Screening Questionnaire for Adult Immunization    Are you sick today?   No   Do you have allergies to medications, food, a vaccine component or latex?   No   Have you ever had a serious reaction after receiving a vaccination?   No   Do you have a long-term health problem with heart, lung, kidney, or metabolic disease (e.g., diabetes), asthma, a blood disorder, no spleen, complement component deficiency, a cochlear implant, or a spinal fluid leak?  Are you on long-term aspirin therapy?   No   Do you have cancer, leukemia, HIV/AIDS, or any other immune system problem?   No   Do you have a parent, brother, or sister with an immune system problem?   No   In the past 3 months, have you taken medications that affect  your immune system, such as prednisone, other steroids, or anticancer drugs; drugs for the treatment of rheumatoid arthritis, Crohn s disease, or psoriasis; or have you had radiation treatments?   No   Have you had a seizure, or a brain or other nervous system problem?   No   During the past year, have you received a transfusion of blood or blood    products, or been given immune (gamma) globulin or antiviral drug?   No   For women: Are you pregnant or is there a chance you could become       pregnant during the next month?   No   Have you received any vaccinations in the past 4 weeks?   No     Immunization questionnaire answers were all negative.    I have reviewed the following standing orders:   This patient is due and qualifies for the Influenza vaccine.    Click here for Influenza Vaccine Standing Order    I have reviewed the vaccines inclusion and exclusion criteria; No concerns regarding eligibility.     Patient instructed to remain in clinic for 15 minutes afterwards, and to report any adverse reactions.     Screening performed by  Yaima Curran MA on 10/4/2024 at 9:56 AM.

## 2024-11-08 ENCOUNTER — ANCILLARY PROCEDURE (OUTPATIENT)
Dept: RADIOLOGY | Facility: CLINIC | Age: 64
End: 2024-11-08
Payer: COMMERCIAL

## 2024-11-12 ENCOUNTER — TELEPHONE (OUTPATIENT)
Dept: NEUROSURGERY | Facility: CLINIC | Age: 64
End: 2024-11-12
Payer: COMMERCIAL

## 2024-11-12 ENCOUNTER — TRANSCRIBE ORDERS (OUTPATIENT)
Dept: OTHER | Age: 64
End: 2024-11-12

## 2024-11-12 DIAGNOSIS — R20.0 NUMBNESS: ICD-10-CM

## 2024-11-12 DIAGNOSIS — M54.2 NECK PAIN: Primary | ICD-10-CM

## 2024-11-12 DIAGNOSIS — R29.898 RIGHT ARM WEAKNESS: ICD-10-CM

## 2024-11-12 NOTE — TELEPHONE ENCOUNTER
[facility] Call Center    Phone Message    May a detailed message be left on voicemail: yes     Reason for Call: Dr. Tinajero from [facility] called.  She wants this Pt seen today for cervical spinal stenosis with new symptoms.  She said that she sent a referral today.    [facility]  They want to do surgery and want the surgery completed within 1 week and that's why she said that the Pt has to be seen today.  She is requesting a call back ASAP to schedule.  Thanks.

## 2024-11-12 NOTE — TELEPHONE ENCOUNTER
Date: November 12, 2024    Provider: FITO Romeo MD     Provider/Other: N/A    Reason for out-going call: NEW PATIENT REFERRAL/CONSULT      Detailed message: LDVM- for patient with  on the line to have patient c/b and schedule for a new patient consult with Dr. Melvin this week 11/13/2024 or w/ Dr. Abel 11/14/2024.     Appointment notes:  New consult  Referring provider: Dr. Tinajero   DX: neck pain, right arm weakness, numbness           Number provider for patient:  NEUROSURGERY: [phone]

## 2024-11-12 NOTE — TELEPHONE ENCOUNTER
Called and verified with Dr. Tinajero a consult for second opinion for Mr. La. Will set up appt ASAP.  Erika Sarkar RN, CNRN

## 2024-11-13 NOTE — TELEPHONE ENCOUNTER
NEUROSURGERY - NEW PREVISIT PLANNING    Referring Provider: Meg Tinajero MD   OVN 10/22/2024   Reason For Visit: Neck pain [M54.2]  Right arm weakness [R29.898]  Numbness [R20.0]         IMAGING STATUS/LOCATION DATE/TYPE   MRI EXTERNAL/PACS 11/8/2024  MR CERVICAL SPINE    CT NA    XRAY EXTERNAL/PACS 10/22/2024  XR CERVICAL SPINE   NOTES     Other specialist OVN: NA    EMG NA    INJECTION NA    PHYSICAL THERAPY NA    SURGERY NA

## 2024-11-14 ENCOUNTER — PRE VISIT (OUTPATIENT)
Dept: NEUROSURGERY | Facility: CLINIC | Age: 64
End: 2024-11-14

## 2024-11-14 ENCOUNTER — TELEPHONE (OUTPATIENT)
Dept: NEUROSURGERY | Facility: CLINIC | Age: 64
End: 2024-11-14

## 2024-11-14 ENCOUNTER — OFFICE VISIT (OUTPATIENT)
Dept: NEUROSURGERY | Facility: CLINIC | Age: 64
End: 2024-11-14
Attending: FAMILY MEDICINE
Payer: COMMERCIAL

## 2024-11-14 ENCOUNTER — PREP FOR PROCEDURE (OUTPATIENT)
Dept: NEUROLOGY | Facility: CLINIC | Age: 64
End: 2024-11-14

## 2024-11-14 VITALS
DIASTOLIC BLOOD PRESSURE: 76 MMHG | SYSTOLIC BLOOD PRESSURE: 130 MMHG | HEART RATE: 64 BPM | WEIGHT: 160 LBS | HEIGHT: 60 IN | BODY MASS INDEX: 31.41 KG/M2 | OXYGEN SATURATION: 97 %

## 2024-11-14 DIAGNOSIS — M54.12 CERVICAL RADICULOPATHY: Primary | ICD-10-CM

## 2024-11-14 DIAGNOSIS — M47.12 CERVICAL SPONDYLOSIS WITH MYELOPATHY: ICD-10-CM

## 2024-11-14 PROCEDURE — 99204 OFFICE O/P NEW MOD 45 MIN: CPT | Performed by: SURGERY

## 2024-11-14 ASSESSMENT — PAIN SCALES - GENERAL: PAINLEVEL_OUTOF10: SEVERE PAIN (7)

## 2024-11-14 NOTE — PROGRESS NOTES
NEUROSURGERY CONSULTATION NOTE      Neurosurgery was asked to see this patient by Meg Tinajero MD for evaluation of ***.       CONSULTATION ASSESSMENT AND PLAN:    ***  Recommend cervical 2 laminectomy and right cervical 6-cervical 7 and bilateral cervical 7-thoracic 1 foraminotomies with cervical 6-thoracic 1 posterior instrumented fusion and arthrodesis wit use of stealth.  Risks and benefits were discussed in detail including but not limited to infection, hematoma, nerve damage including paralysis, post op radiculitis, durotomy, lack of a sold bone fusion, hardware malfunction, adjacent segment disease, inadequate symptom relief, risks associated with the use of general anesthesia.    Will attempt to obtain a right C7-T1 transforaminal lumbar epidural steroid injection for pain control in the meantime.     Gabapentin titration sheet was also provided.     Geneva Abel MD      HISTORY OF PRESENTING ILLNESS:    63 yo male who is status post C3-6 posterior cervical laminectomy for OPLL and spinal canal stenosis by Dr. Brennan in 2018.     At the time of his first surgery he was having numbness, fine motor difficulties etc.   Following the surgery he felt worse. Lost sensation after surgery per patient was in the hospital a month after surgery. Mostly on his right side. This is chronic. More recently in last month he developed, right hand numbness particularly in his fingers with tingling. This is painful as well. He also has worsened neck pain. If he moves his neck he will get radiation all the way down his arm. The pain travels down mostly in 4th and 5th digit. Wrist to his shoulder.     Massage doesn't help. Ambulates with a cane. Has chronic imbalance. His right hand is weaker. Did have a recent fall. No bowel or bladder loss. Constapiation. Numbness as well in his left hand. More tingling in the hand itself. No radicular symptoms. Clumsiness bilatearlly.     He recently saw dr Solorio and was  diagnosed with severe compression at C2 and recomened lmainectomy. He also was recommended to restart gabapentin.     Medications do not help all his symptoms. Gabapentin and tramadol.  Possibly also taking robaxin.      Past Medical History:   Diagnosis Date    Gastroesophageal reflux disease     Gout     Hypertension     Sleep apnea        Past Surgical History:   Procedure Laterality Date    IMPLANT PROSTHESIS PENIS INFLATABLE N/A 12/28/2023    Procedure: PLACEMENT OF INFLATABLE PENILE PROSTHESIS;  Surgeon: Henok Harden MD;  Location: [facility] Main OR       REVIEW OF SYSTEMS:  See ROS form under media     MEDICATIONS:    Current Outpatient Medications   Medication Sig Dispense Refill    acetaminophen (TYLENOL) 500 MG tablet Take 1,000 mg by mouth every 6 hours as needed      allopurinol (ZYLOPRIM) 300 MG tablet Take 300 mg by mouth daily      atenolol (TENORMIN) 25 MG tablet Take 25 mg by mouth daily      bisacodyl (DULCOLAX) 5 MG EC tablet Take 5 mg by mouth daily      divalproex sodium extended-release (DEPAKOTE ER) 500 MG 24 hr tablet Take 500 mg by mouth At Bedtime      gabapentin (NEURONTIN) 300 MG capsule Take 600 mg by mouth every evening      gabapentin (NEURONTIN) 300 MG capsule Take 1,200 mg by mouth every morning      levothyroxine (SYNTHROID/LEVOTHROID) 25 MCG tablet Take 25 mcg by mouth daily      LINZESS 290 MCG capsule Take 290 mcg by mouth every morning      lisinopril (ZESTRIL) 5 MG tablet Take 5 mg by mouth daily      methocarbamol (ROBAXIN) 500 MG tablet Take 1,000 mg by mouth 2 times daily      Omega-3 1000 MG capsule Take 2 g by mouth daily      omeprazole (PRILOSEC OTC) 20 MG EC tablet Take 20 mg by mouth daily      polyethylene glycol (MIRALAX) 17 g packet Take 1 packet by mouth daily      pramipexole (MIRAPEX) 0.25 MG tablet Take 0.5 mg by mouth 2 times daily      sulfamethoxazole-trimethoprim (BACTRIM DS) 800-160 MG tablet Take 1 tablet by mouth 2 times daily 14 tablet 0     traMADol (ULTRAM) 50 MG tablet Take 150 mg by mouth every evening      traMADol (ULTRAM) 50 MG tablet Take 100 mg by mouth every morning           ALLERGIES/SENSITIVITIES:     Allergies   Allergen Reactions    Duloxetine      nightmares      Fluoxetine Nausea     Other reaction(s): GI Upset    Metoprolol Headache     Atenolol ok  Stopped due to persistent headache--tolerates atenolol without problem      Sertraline Unknown    Paroxetine Other (See Comments)     Other reaction(s): Impotence         PERTINENT SOCIAL HISTORY:   Social History     Socioeconomic History    Marital status:    Tobacco Use    Smoking status: Unknown   Substance and Sexual Activity    Alcohol use: Not Currently     Comment: small amount over [place]    Drug use: Never   Social History Narrative    8/7/2019: Wife is bedside.     Social Drivers of Health     Financial Resource Strain: Low Risk  (10/22/2024)    Received from [facility]    Financial Resource Strain     Difficulty of Paying Living Expenses: 3   Food Insecurity: No Food Insecurity (10/22/2024)    Received from [facility]    Food Insecurity     Do you worry your food will run out before you are able to buy more?: 1   Transportation Needs: No Transportation Needs (10/22/2024)    [facility]    Transportation Needs     Does lack of transportation keep you from medical appointments?: 1     Does lack of transportation keep you from work, meetings or getting things that you need?: 1   Social Connections: Socially Integrated (10/22/2024)    Received from [facility]    Social Connections     Do you often feel lonely or isolated from those around you?: 0   Housing Stability: Low Risk  (10/22/2024)    Received from [facility]    Housing Stability     What is your housing situation today?: 1          FAMILY HISTORY:  No family history on file.     PHYSICAL EXAM:   Constitutional: Ht 1.524 m (5')   Wt 72.6 kg (160 lb)   BMI 31.25 kg/m       Mental Status: A & O in no acute distress.  Affect is appropriate.  Speech is fluent.  Recent and remote memory are intact.  Attention span and concentration are normal.     Motor: Normal bulk and tone all muscle groups of upper and lower extremities.    Strength:   Left bicpe 4/5 and bilateral interossei 3/5    Sensory: Sensation intact bilaterally to light touch excep sensroy leel in uppder thoracic only      Coordination;wide based gait      Reflexes; supinator, biceps, triceps, knee/ ankle jerk intact 1+. No hoffmans    IMAGING:  I personally reviewed all radiographic images         Cc:   Mejia Thompson            today?: 1         FAMILY HISTORY:  No family history on file.     PHYSICAL EXAM:   Constitutional: Ht 1.524 m (5')   Wt 72.6 kg (160 lb)   BMI 31.25 kg/m       Mental Status: A & O in no acute distress.  Affect is appropriate.  Speech is fluent.  Recent and remote memory are intact.  Attention span and concentration are normal.     Motor: Normal bulk and tone all muscle groups of upper and lower extremities.    Strength: Left biceps 4/5 and bilateral interossei 3/5 otherwise 5/5    Sensory: Sensation intact bilaterally to light touch except sensroy level in upper thoracic only      Coordination;wide based gait      Reflexes; supinator, biceps, triceps, knee/ ankle jerk intact 1+. No cuevas's    IMAGING:  I personally reviewed all radiographic images         Cc:   Mejia Thompson

## 2024-11-14 NOTE — PATIENT INSTRUCTIONS
Recommend cervical 2 laminectomy and right cervical 6-cervical 7 and bilateral cervical 7-thoracic 1 foraminotomies with posterior instrumented fusion and arthrodesis wit use of stealth.  Risks and benefits were discussed in detail including but not limited to infection, hematoma, nerve damage including paralysis, post op radiculitis, durotomy, lack of a sold bone fusion, hardware malfunction, adjacent segment disease, inadequate symptom relief, risks associated with the use of general anesthesia.    Will attempt to obtain a right C7-T1 transforaminal lumbar epidural steroid injection for pain control in the meantime.     Gabapentin titration sheet was also provided.         Please review COMPLETE information about your proposed surgery, pre-operative requirements, post-operative course and expectations - available in a folder provided to you in clinic!    Your surgery scheduler will call you within 3 business days to begin the process of scheduling your surgery and appointments.     Pre-Operative    Pre-operative physical / Lab work with primary care physician within 30 days of surgical date. We prefer the pre-op exam to be done 2 weeks prior to surgery. We also prefer pre-op lab work be done at [facility] outpatient lab, 2 weeks prior to surgery.     If all pre-op appointments/test are not completed prior to your surgery date, you will be asked to reschedule your surgery.           As part of preparation for your upcoming procedure your primary care doctor may order a test to rule out a COVID-19 infection. This is no longer a requirement prior to surgery.     Readiness Visits    Prior to surgery, you may have a telephone or in person readiness visit with our RN team to discuss your upcomming surgery, results of your pre-op physical, and lab work.   If you will require a collar/neck brace after surgery, you will be fitted for one at your readiness visit prior to surgery (scheduled by the  surgery scheduler).     Shower procedure    Hibiclens shower: Use one packet the night before surgery and one packet the morning of surgery for a whole body shower. Avoid face, hair, and genitals.      Eating/Drinking    Stop all solid foods 8 hours before surgery.  Stop all clear liquids 2 hours prior to arrival time     Clear liquids include water, clear juice, black coffee, or clear tea without milk, Gatorade, clear soda.     Medications - please refer to the pre-operative medication instructions sheet in your folder    Hold Aspirin, NSAIDs (Advil/Ibuprofen, Indocin, Naproxen,Nuprin,Relafen/Nabumetone, Diclofenac,Meloxicam, Aleve, Celebrex) and all vitamins and supplements x 7-10 days prior to surgical date  You can take Tylenol (Acetaminophen) for pain up until the date of your surgery   Do not exceed 3,000 mg per day   Any other medications prescribed, please discuss with your primary care provider at your pre-operative physical. Please discuss when/if it is safe for you to stop taking blood thinners with your primary care provider.   We will NOT provide pain medications prior to surgery. We will prescribe post-op pain medications for up to 6 weeks after surgery.       FMLA/Short-term disability    If you are currently employed, you will likely need to be off work for 4-6 weeks for post-op recovery and healing.  Please fax any FMLA/short term disability paperwork to [phone], mail it into the clinic, drop it off in person, or send via a [employer] message.   You may also call our clinic with the date in which you'd like to return to work, and we can provide a work letter to your employer  We will support Short-Term Disability up to 12 weeks, beginning the date of your surgery. We do NOT support Long-Term Disability/Social Security Benefits.     Pain Management after surgery    Dealing with pain    As your body heals, you might feel a stabbing, burning, or aching pain. You may also have some numbness.  Everyone  feels pain differently, we may ask you to rate your pain using a pain scale. This will let us know how much pain you feel.   Keep in mind that medicine won't take away all of your pain. It helps to try other ways to relax and ease pain.     Things to help with pain    After surgery, we will give you medicine for your pain. These medications work well, but they can make you drowsy, itchy, or sick to your stomach, and constipated. Try to take narcotics with food if they cause nausea.   For mild to moderate pain, you can take medication such as Tylenol or Ibuprofen. These can be used with narcotics and muscle relaxants. *If you have had a fusion: do NOT use NSAIDs for 6 months after surgery.   Do NOT drive while taking narcotic pain medication  Do NOT drink alcohol while using narcotic pain medication  You can utilize ice as needed (no longer than 20 minutes at one time) you may apply this over your covered incision  Utilize heat for muscle spasms, do not apply heat over your incision  If a muscle relaxer is prescribed, please do NOT take it at the same time as your narcotic pain medication. Take them at least 90 minutes apart.   You may also use pain cream/patches on sore muscles. Do NOT apply these on your incision. Patches may be cut in 1/2 if needed.     *After your surgery, if you will be staying in-patient, a nursing team will be monitoring you closely throughout your stay and communicate your health status to your surgeon and other providers.  You will be seen by Advanced Practice Providers (e.g., nurse practitioners, clinic nurse specialists, and physician assistants) who will check on you regularly to assess the status of your surgical recovery.     Incision Care    Look at your incision site every day. You  may need a mirror, or family member to help you.   Do not submerge your incision in water such as pools, hot tubs, baths for at least 6 weeks or until incision is healed  You may get your incision wet in  the shower. Allow water and soap to run over incision, and gently pat dry.   Remove the dressing the day after you are discharged from the hospital. Keep the incision clean and dry at all times. This may require several bandage changes.   Contact us right away if you have:   Fever over 101 degrees farenheit  Green or yellow drainage (pus) from your incision or increased bloody drainage   Redness, swelling, or warmth at your surgery site   Notify the clinic [phone].    Activity Restrictions    For the first 6 weeks, no lifting,pushing, or pulling > 5-10 pounds, no bending, twisting.  Use the stairs in moderation   Walking: Walking is the best way to start exercise after surgery. Take short frequent walks. You may gradually increase the distance as tolerated. If you feel pain, decrease your activity, but DO NOT stop walking. Walking will help you regain strength.  Avoid prolonged positioning for longer than 30 minutes (change positions frequently while awake)  No contact sports until after follow up visit  No high impact activities such as; running/jogging, snowmobile or 4 sun riding or any other recreational vehicles until deemed safe by your surgeon/care team.   Please call the clinic if you develop any of the following symptoms:  Swelling and/or warmth in one or both legs  Pain or tenderness in your leg, ankle, foot, or arm   Red or discolored/pale skin     Post-Op Follow Up Appointments    We will call you to schedule these appointments after your discharge from the hospital.   Incision assessment within 2 weeks with a Registered Nurse   6 week post-op with a Nurse Practitioner/Physician Assistant. Your surgeon will be available on this day.

## 2024-11-14 NOTE — TELEPHONE ENCOUNTER
Procedure ordered? YES    What insurance are we billing for this procedure?  [facility]  IF SCHEDULING AT [place] [facility] PLEASE SCHEDULE AT LEAST 7-10 BUSINESS DAYS OUT SO A PA CAN BE OBTAINED    Is a  PAIN  order available to link to injection appointment or does one need to be transcribed?  YES:     Pain Transforaminal [place] Inj Cervical 1 Lvl Rt      If needed, route to CN to assist in doing so.    Is  needed?: Yes: ASL   If YES, please initiate in-person  request.    Will patient have a ?  Yes    All fluoro procedures require a .  These US procedures also require a : piriformis, pudendal, scalene, & carpal tunnel.    Is patient taking any blood thinners (i.e. Plavix, coumadin, jantoven, warfarin, heparin, Xarelto, Pradaxa, Eliquis, Brilinta, or Effient, etc)? No   If YES, schedule out 2 weeks, and route to RN pool to determine if medication hold is needed.    Is patient taking aspirin? No   For CERVICAL or INTERLAMINAR procedures, route message to Care Navigation so they can seek approval from managing provider to hold aspirin for 6 days prior to the procedure.    Does patient have an allergy to contrast dye or iodine?  No  If YES, OK to schedule. Route to RN pool AND add allergy information to appointment notes    Is patient diabetic? No If YES, blood glucose level will be checked on day of procedure and will need to be 300mg/dL or below (for steroid injections).    Does patient have an active infection or treated for one within the past week? No   If YES, do NOT schedule and route to Care Navigation.     Is patient currently taking any antibiotics or have an active infection?  No  For patients on chronic, preventative, or prophylactic antibiotics, procedures may be scheduled.   For patients on antibiotics for active or recent infection: antibiotic course must have been completed and symptom-free of infection to safely proceed with injection.  Send to Care Navigation if  unsure.    Is patient actively being treated for cancer or immunocompromised? No  If YES, do NOT schedule and route to RN pool.     Any chance of pregnancy? Not Applicable   If YES, do NOT schedule and route to RN pool.    Have you had any vaccines in the last 2 weeks? NO  If YES, please route to Care Navigation to discuss before scheduling.     Reminders:  -  If you are started on any steroids or antibiotics between now and your appointment, you must contact us because it may affect our ability to perform your procedure.   reviewed and discussed briefly    -  Informed patient that it is OK to take normal medications before the procedure and must hold blood thinners as instructed.  reviewed and discussed briefly    -  Patients scheduled for MBBs should not take pain meds prior to injection and pain rating needs to be 5/10 or greater the day of the procedure.    -  Informed cervical injection patients that an IV will be placed as a precautionary measure.  reviewed and not discussed    -  Patients should eat a light meal prior to their procedure appointment.  reviewed and discussed briefly    -  All radiofrequency ablations are in a 40 minute time slot and not to be scheduled until in-basket message has been sent by the procedure team that the PA has been  received.  reviewed and not discussed     -  Spinal cord stimulator trial scheduling is coordinated by the procedure team.    -  Care Navigation (#[phone]) is available to assist patients with additional questions.  reviewed and discussed briefly    -  Cervical TFESIs with Dr. Ruff only.

## 2024-11-14 NOTE — NURSING NOTE
Torsten La is a 64 year old male who presents for:  Chief Complaint   Patient presents with    Consult     Neck pain, right arm weakness and numbness. Pain level in neck about 6-7, there's numbness and shooting pains, and stiffness. Hard to get any sleep. Medication helps but not completely. Numbness and shooting pain in both arms but more so on right. Patient is interested in being pain free so they would like surgery as soon as possible if they are a candidate.        Initial Vitals:  /76   Pulse 64   Ht 5' (1.524 m)   Wt 160 lb (72.6 kg)   SpO2 97%   BMI 31.25 kg/m   Estimated body mass index is 31.25 kg/m  as calculated from the following:    Height as of this encounter: 5' (1.524 m).    Weight as of this encounter: 160 lb (72.6 kg). Body surface area is 1.75 meters squared. BP completed using cuff size: regular  Severe Pain (7)        Celestino Louie

## 2024-11-15 ENCOUNTER — RADIOLOGY INJECTION OFFICE VISIT (OUTPATIENT)
Dept: PHYSICAL MEDICINE AND REHAB | Facility: CLINIC | Age: 64
End: 2024-11-15
Attending: SURGERY
Payer: COMMERCIAL

## 2024-11-15 ENCOUNTER — TELEPHONE (OUTPATIENT)
Dept: NEUROSURGERY | Facility: CLINIC | Age: 64
End: 2024-11-15

## 2024-11-15 VITALS
RESPIRATION RATE: 16 BRPM | OXYGEN SATURATION: 95 % | TEMPERATURE: 98.1 F | DIASTOLIC BLOOD PRESSURE: 80 MMHG | HEART RATE: 81 BPM | SYSTOLIC BLOOD PRESSURE: 124 MMHG

## 2024-11-15 DIAGNOSIS — M54.12 CERVICAL RADICULOPATHY: ICD-10-CM

## 2024-11-15 RX ORDER — DEXAMETHASONE SODIUM PHOSPHATE 10 MG/ML
INJECTION, SOLUTION INTRAMUSCULAR; INTRAVENOUS
Status: COMPLETED | OUTPATIENT
Start: 2024-11-15 | End: 2024-11-15

## 2024-11-15 RX ORDER — LIDOCAINE HYDROCHLORIDE 10 MG/ML
INJECTION, SOLUTION EPIDURAL; INFILTRATION; INTRACAUDAL; PERINEURAL
Status: COMPLETED | OUTPATIENT
Start: 2024-11-15 | End: 2024-11-15

## 2024-11-15 RX ADMIN — LIDOCAINE HYDROCHLORIDE 2 ML: 10 INJECTION, SOLUTION EPIDURAL; INFILTRATION; INTRACAUDAL; PERINEURAL at 14:34

## 2024-11-15 RX ADMIN — DEXAMETHASONE SODIUM PHOSPHATE 10 MG: 10 INJECTION, SOLUTION INTRAMUSCULAR; INTRAVENOUS at 14:34

## 2024-11-15 ASSESSMENT — PAIN SCALES - GENERAL
PAINLEVEL_OUTOF10: SEVERE PAIN (7)
PAINLEVEL_OUTOF10: SEVERE PAIN (7)

## 2024-11-15 NOTE — PATIENT INSTRUCTIONS
Follow-up visit with Dr. Abel of [facility] (#[phone]) in 2 weeks if symptoms worsen or fail to improve.       DISCHARGE INSTRUCTIONS    During office hours (8:00 a.m.- 4:00 p.m.) questions or concerns may be answered  by calling [facility] Navigation Nurses at  [phone].  Messages received after hours will be returned the following business day.      In the case of an emergency, please dial 911 or seek assistance at the nearest Emergency Room/Urgent Care facility.     All Patients:    You may experience an increase in your symptoms for the first 2 days (It may take anywhere between 2 days- 2 weeks for the steroid to have maximum effect).    You may use ice on the injection site, as frequently as 20 minutes each hour if needed.    You may take your pain medicine.    You may continue taking your regular medication after your injection. If you have had a Medial Branch Block you may resume pain medication once your pain diary is completed.    You may shower. No swimming, tub bath or hot tub for 48 hours.  You may remove your bandaid/bandage as soon as you are home.    You may resume light activities, as tolerated.    Resume your usual diet as tolerated.    If you were told to hold any blood thinning medications you may resume taking them 24 hours after your procedure as prescribed.    It is strongly advised that you do not drive for 1-3 hours post injection.    If you have had oral sedation:  Do not drive for 8 hours post injection.      If you have had IV sedation:  Do not drive for 24 hours post injection.  Do not operate hazardous machinery or make important personal/business decisions for 24 hours.      POSSIBLE STEROID SIDE EFFECTS (If steroid/cortisone was used for your procedure)    -If you experience these symptoms, it should only last for a short period    Swelling of the legs              Skin redness (flushing)     Mouth (oral) irritation   Blood sugar (glucose) levels             Sweats                    Mood changes  Headache  Sleeplessness  Weakened immune system for up to 14 days, which could increase the risk of placido the COVID-19 virus and/or experiencing more severe symptoms of the disease, if exposed.  Decreased effectiveness of the flu vaccine if given within 2 weeks of the steroid.         POSSIBLE PROCEDURE SIDE EFFECTS  -Call the Spine Center if you are concerned  Increased Pain           Increased numbness/tingling      Nausea/Vomiting          Bruising/bleeding at site      Redness or swelling                                              Difficulty walking      Weakness           Fever greater than 100.5    *In the event of a severe headache after an epidural steroid injection that is relieved by lying down, please call the [facility] to speak with a clinical staff member*

## 2024-11-15 NOTE — TELEPHONE ENCOUNTER
Date: November 15, 2024    Provider: Dr. Colten Willams MD     Provider/Other: n/a    Reason for out-going call: SURGERY SCHEDULED/ NEEDS TO SCHEDULE       Detailed message: ldvm with  on the line to have patient call back and schedule surgery. Offer 12/9/2024 for surgery at [facility] with Dr. Abel.          Number provider for patient: DOMINIC BALDWIN : [phone]

## 2024-11-18 NOTE — TELEPHONE ENCOUNTER
Date: November 18, 2024    Provider: Dr. Colten Willams MD     Provider/Other: n/a    Reason for out-going call: SURGERY SCHEDULED/ NEEDS TO SCHEDULE       Detailed message: harinder with  services on the line for patient to c/b and schedule surgery with Colten for next opening date.           Number provider for patient:  NEUROSURGERY: [phone]

## 2024-11-25 NOTE — H&P (VIEW-ONLY)
[facility]      Preoperative Consultation   Torsten La   : 1960   Gender: male    Date of Encounter: 2024    Nursing Notes:   Kelly Fallon RN  2024  9:33 AM  Signed  Torsten La is a 64 y.o. male (1960) who presents for preop evaluation undergoing cervical 2 laminectomy      Date of Surgery: 24  Surgical Specialty: Neurosurgery  Geneva Abel MD  Hospital/Surgical Facility: [facility]  Fax number: [phone]   Surgery type: inpatient  Primary Physician: Meg Tinajero RN ....................  2024   9:32 AM     Kelly Fallon RN  2024  9:59 AM  Addendum  Chief Complaint   Patient presents with     Pre-Op Exam     DOS cervical 2 laminectomy 24       Additional visit information (chief complaint/health maintenance) shared by patient: Old scratches on right shin, requesting Blood Type & Screen orders, Needs referral to sleep MD for BIPAP, not using current CPAP    Health Maintenance Due   Topic Date Due     Depression screening for age 12+  10/11/2024       Health maintenance reviewed with patient Yes    Patient presents for an in-person office visit: with ASL- and with spouse/significant other  Communication Method: Patient is active on [employer] and has been instructed that results/communications will be made via [employer]  If a phone call is needed, the preferred number is:  Home Phone  Home Phone [phone]   Mobile [phone]     May we leave a detailed message at this number? Yes    Kelly Fallon RN ....................  2024   9:54 AM          History of Present Illness   Neck and right arm pain since 10/20/24, attributed to cervical radiculopathy, cervical spondylosis with myelopathy.  Recommending cervical 2 laminectomy and right cervical 6-cervical 7 and bilateral cervical 7-thoracic 1 foraminotomies with posterior instrumented fusion and arthrodesis with  use of stealth, attempt right C7-T1 transforaminal lumbar epidural steroid injection for pain control         Review of Systems   He scratched his leg against the edge of a table he was kneeling on, on Thursday last week, 11/21/24.  The scrape is still healing.  He has cleaned it with alcohol and applied over-the-counter antibiotic ointment daily since then.    Dry mouth for 18 months.  2 stitches in his upper lip on Monday, 11/18/24.  Those stitches will come out tomorrow.  Dr. Sinha also gave him triamcinolone 0.025% ointment twice a day.    Remainder of complete review of systems is negative except as above.       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     Cervical myelopathy (HC) G95.9     Impingement syndrome of left shoulder M75.42     Tear of right rotator cuff M75.101     Osteoarthritis of glenohumeral joint, left M19.012     Neuropathic pain of shoulder M79.2     Primary osteoarthritis of right hip M16.11     Improving weakness of right hip R29.898     Greater trochanteric bursitis, right M70.61     S/P extended cervical decompressive laminectomy encompassing the C3-C6 levels on 7/12/18 by Andrew Solorio, [facility] R20.0     Possible L5 lumbar radiculopathy, right M54.16     Adjustment disorder with depressed mood F43.21     Chronic midline low back pain with right-sided sciatica M54.41, G89.29     CKD (chronic kidney disease) stage 3, GFR 30-59 ml/min (HC) N18.30     History of colon polyps Z86.0100     Fatty liver by ultrasound K76.0     Hypothyroidism (acquired)  E03.9     Lumbosacral stenosis M48.07     MCI (mild cognitive impairment) G31.84     PAD (peripheral artery disease) (HC) I73.9     Controlled substance agreement signed Z79.899     BMI 30-34.9 E66.811     Chronic constipation K59.09     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     atenoloL (TENORMIN) 25 mg tablet Take 1 Tablet (25 mg) by mouth once daily.     cholecalciferol (VITAMIN D) 1,000 unit tablet Takes 2000 units daily since low     CPAP CPAP machine for home use at pressure 5-10, Mask of choice x 1 q 3mo, nasal cushion x 2 q 1mo, nasal pillow x 2 q 1mo, and full-face cushion x 1 q 1mo, Length of need: 99, Daily use.     cyanocobalamin, vitamin B-12, (VITAMIN B12 ORAL) Take by mouth.     gabapentin (NEURONTIN) 300 mg capsule TAKE 4 CAPS ORALLY EVERY MORNING AND 2 EVERY EVENING. MAY TAKE ADDITIONAL 1-2 CAPS DAILY AS NEEDED     HYDROcodone-acetaminophen (5-325 mg/tablet) Take 1 Tablet by mouth 2 times daily if needed for Pain ((R) arm pain.). Severe pain (R) arm: until surgery: 11/15/2024-12/13/2024 .     levothyroxine (SYNTHROID) 50 mcg tablet Take 1 Tablet (50 mcg) by mouth once daily.     Linzess 290 mcg cap capsule Take 1 Capsule (290 mcg) by mouth before breakfast.     lisinopriL (PRINIVIL; ZESTRIL) 5 mg tablet Take 1 Tablet (5 mg) by mouth once daily.     methocarbamoL (ROBAXIN) 500 mg tablet TAKE 2 TABS BY MOUTH ONCE IN THE MORNING AND ONCE IN THE EVENING AS NEEDED     naloxone (Narcan) 4 mg/actuation nasal spray Inhale 1 Spray into affected nostril(s) each time if needed for Patient Diff To Arouse or Resp Rate < 8 / min. Additional doses may be given every 2 to 3 minutes until emergency medical  assistance arrives.     Omega-3-DHA-EPA-Fish Oil (Fish OiL) 1,000 (120-180) mg cap 2 tabs (2000 IU) each am.     pramipexole (MIRAPEX) 0.25 mg tablet Take 1 Tablet (0.25 mg) by mouth two times daily.     tacrolimus (PROTOPIC) 0.1 % ointment APPLY TO ITCHY SPOT ON EYELID TWICE DAILY     [START ON 2024] traMADoL (ULTRAM) 50 mg tablet Si-2 tablets by mouth as needed for pain. MAX: 5 Tabs/Day.  Use date :24-25     triamcinolone 0.025 % ointment Apply to lips twice daily until follow-up     No current facility-administered medications for this visit.     Medications have been reviewed by me and are current to the best of my knowledge and ability.     Allergies   Allergen Reactions     Cymbalta [Duloxetine] Other - Describe In Comment Field     nightmare     Fluoxetine GI Upset     Metoprolol Headache     Stopped due to persistent headache--tolerates atenolol without problem     Paroxetine Impotence     Sertraline *Unknown - Follow up needed     Trazodone Sleep Disturbances     Paroxetine Hcl Other - Describe In Comment Field     Sexual side effect     Past Surgical History:   . Laterality Date     CATARACT EXTRACTION W/  INTRAOCULAR LENS IMPLANT Left 10/12/2016    Phaco/PCL (John)     CATARACT EXTRACTION W/ INTRAOCULAR LENS IMPLANT Right 2021    Dr. Mclain     CIRCUMCISION  2008     colonocospy  2021    recheck 5 years - polyp     decompressive cervical laminectomy C3-4-5  2018     INSERTION INFLATABLE PENILE PROSTHESIS  2023     ME UNLISTED PROCEDURE EXTERNAL EAR  1978     Social History     Tobacco Use     Smoking status: Never     Passive exposure: Never     Smokeless tobacco: Never   Vaping Use     Vaping status: Never Used   Substance Use Topics     Alcohol use: Yes     Comment: very rare/ holidays     Drug use: No     Comment: Caffeine - none     Family History   Problem Relation Age of Onset     Kidney failure Mother      Tuberculosis Father      Suicidality Sister       Suicidality Sister      No Known Problems Daughter      No Known Problems Son      Kidney cancer Half-Brother      Heart Disease Half-Brother      Heart Disease Half-Brother      PAST DIFFICULTY WITH ANESTHESIA: no personal or family history      Physical Exam   /80 (Cuff Site: Right Arm, Position: Sitting, Cuff Size: Adult Regular)   Pulse 67   Temp 98.1  F (36.7  C) (Oral)   Resp 16   Ht 1.524 m (5')   Wt 77.9 kg (171 lb 11.2 oz)   SpO2 98%   BMI 33.53 kg/m   Body mass index is 33.53 kg/m .  Physical Exam      General Appearance:  Alert, appropriate appearance for age. No acute distress.  HEENT Exam:  TM's clear, Funduscopic exam benign with sharp disc margins.  Oropharynx clear without exudates.  Neck / Thyroid Exam:  Supple, no masses, bruits, thyromegaly, or lymphadenopathy.  Chest/Respiratory Exam: Normal chest wall and respirations. Clear to auscultation.  Cardiovascular Exam: Regular rate and rhythm. S1, S2, no murmur, click, gallop, or rubs.  Gastrointestinal Exam: Positive bowel sounds, soft, non-tender, no masses or organomegaly.  Skin: no lesions with malignant features, no pallor, rash or jaundice.  See photo of healing wounds.  Extremities:  No edema.  Pulses 2+ at dorsalis pedis  Neurologic Exam: Normal gait and speech, no tremor.  DTR's symmetric at the knees.  Psychiatric Exam: Alert and oriented, appropriate affect.  Mood appears normal.                 Assessment / Plan       The Pre-Op Tool    Recommendations      Intermediate Risk Procedure    Risk of CV Complication (RCRI)  1%    Current Cardiac Status  Poor exertional capacity ( < 4 mets )    Cardiac History  No history of coronary artery disease    Sleep Apnea  History of obstructive sleep apnea, not on treatment           Labs  HGB within last 30 days  Potassium within last 30 days  Creatinine within last 30 days  EKG  Not indicated  CXR  Not indicated    Stress Testing  Not indicated    * Testing recommendations are intended to  assist, but not direct, clinical decisions.           Type & Screen should be obtained by Anesthesia only if the risk of transfusion is > 5% for the procedure         Hold Lisinopril (Prinvil, Zestril) the morning of the procedure.  Continue taking Atenolol (Tenormin); be sure to take the morning of the procedure.  Take your usual dose of opioid pain medicine the morning of procedure  Take your other medications as usual prior to the procedure  Hold vitamins and/or supplements for 1 week prior to the procedure  Hold fish oil for 2 weeks prior to the procedure  Okay to take Acetaminophen (Tylenol) up until the procedure    * Medication recommendations are not intended to be exhaustive; they are limited to common medications that are potentially dangerous if incorrectly managed          Labs  * Data supports elimination of  routine  laboratory testing in favor of focused,  indicated  testing based on medical co-morbidities. A 2009 study randomized 1061 patients undergoing ambulatory, non-cataract surgery to routine or to indicated testing. Perioperative adverse events were similar (Anesthesia & Analgesia 2009;108:467-75; Anesthesiol. Clin. 2016 Mar;34(1):43-58).  EKG  * Age alone is not an absolute indication for a preoperative EKG, and in the absence of clinical risk factors, there is no consensus on the utility of routine preoperative EKG. Our experts recommend against obtaining an EKG if age < 65 for intermediate risk procedures (Anesthesology. 2012;116(3) 1-17; JACC. 2014;64(21);e1-76).  Stress Testing  * The current ACC/AHA guideline states that 'non-invasive stress testing is not useful for patients [with no clinical risk factors] undergoing noncardiac surgery' (JACC. 2014;64(21);e1-76.).  * Data from high risk patients undergoing major vascular surgery have failed to demonstrate benefit from either preoperative stress testing or prophylactic revascularization. Taken together with existing knowledge, for  patients with known or suspected CAD, our experts recommend a preoperative stress test only if it is indicated regardless of the planned surgery. (N Engl J Med 2004;351:2795-80; J Am Isiah Cardiol 2014;64:4798-6429; RU 2020;324:274-290).  RAAS Antagonist  * Hypotension during anesthesia is associated with continuing renin-angiotensin system (RAAS) antagonist. While it is unclear if holding RAAS antagonists reduces postoperative complications, in most circumstances our experts recommend holding RAAS antagonist 24 hours prior to surgery. (Postgrad Med J 2011;87:472-81; Anest 2017;126:16-27; BMC Anesthesiol 2018;18:26.)     Session ID: [id number]  Endnotes and bibliography available upon request: [email or web address]    Labs: yes  ECG: no      ICD-10-CM    1. Pre-op exam  Z01.818 HEMOGLOBIN      2. IVELISSE (obstructive sleep apnea)  G47.33 AMB CONSULT TO SLEEP MEDICINE      3. Cervical radiculopathy  M54.12       4. Cervical spondylosis with myelopathy  M47.12       5. Cervical myelopathy (HC)  G95.9       6. Hypertension  I10 CREATININE     POTASSIUM      7. Sign Supported English  needed  Z75.8       8. Laceration of shin  S81.819A       9. Laceration of right foot, initial encounter  S91.311A          Patient needs wound check 1-2 days prior to surgery.  The patient's active problems have otherwise been diagnostically and therapeutically optimized for planned procedure    Electronically Signed by:   Meg Tinajero MD   11/25/2024

## 2024-12-10 LAB
ABO + RH BLD: NORMAL
BLD GP AB SCN SERPL QL: NEGATIVE
SPECIMEN EXP DATE BLD: NORMAL

## 2024-12-11 ENCOUNTER — LAB (OUTPATIENT)
Dept: LAB | Facility: CLINIC | Age: 64
DRG: 472 | End: 2024-12-11
Payer: COMMERCIAL

## 2024-12-11 DIAGNOSIS — M54.12 CERVICAL RADICULOPATHY: ICD-10-CM

## 2024-12-11 PROCEDURE — 86900 BLOOD TYPING SEROLOGIC ABO: CPT

## 2024-12-11 PROCEDURE — 36415 COLL VENOUS BLD VENIPUNCTURE: CPT

## 2024-12-13 ENCOUNTER — HOSPITAL ENCOUNTER (INPATIENT)
Facility: CLINIC | Age: 64
LOS: 3 days | Discharge: HOME | DRG: 472 | End: 2024-12-16
Attending: SURGERY | Admitting: SURGERY
Payer: COMMERCIAL

## 2024-12-13 ENCOUNTER — APPOINTMENT (OUTPATIENT)
Dept: RADIOLOGY | Facility: CLINIC | Age: 64
DRG: 472 | End: 2024-12-13
Payer: COMMERCIAL

## 2024-12-13 ENCOUNTER — OFFICE VISIT (OUTPATIENT)
Dept: INTERPRETER SERVICES | Facility: CLINIC | Age: 64
End: 2024-12-13

## 2024-12-13 DIAGNOSIS — Z98.1 S/P CERVICAL SPINAL FUSION: Primary | ICD-10-CM

## 2024-12-13 PROBLEM — M54.17 LUMBOSACRAL RADICULOPATHY: Status: ACTIVE | Noted: 2019-07-19

## 2024-12-13 PROBLEM — N18.30 CKD (CHRONIC KIDNEY DISEASE) STAGE 3, GFR 30-59 ML/MIN (H): Status: ACTIVE | Noted: 2022-12-15

## 2024-12-13 PROBLEM — H90.2 CONDUCTIVE HEARING LOSS: Status: ACTIVE | Noted: 2024-12-13

## 2024-12-13 PROBLEM — I73.9 PAD (PERIPHERAL ARTERY DISEASE) (H): Status: ACTIVE | Noted: 2023-10-10

## 2024-12-13 PROBLEM — I10 HYPERTENSION: Status: ACTIVE | Noted: 2024-12-13

## 2024-12-13 PROBLEM — F43.21 ADJUSTMENT DISORDER WITH DEPRESSED MOOD: Status: ACTIVE | Noted: 2019-03-14

## 2024-12-13 PROBLEM — K59.09 CHRONIC CONSTIPATION: Status: ACTIVE | Noted: 2023-12-03

## 2024-12-13 PROBLEM — G31.84 MCI (MILD COGNITIVE IMPAIRMENT): Status: ACTIVE | Noted: 2023-09-13

## 2024-12-13 PROBLEM — M54.12 CERVICAL RADICULOPATHY: Status: ACTIVE | Noted: 2024-11-25

## 2024-12-13 PROBLEM — E03.9 HYPOTHYROIDISM (ACQUIRED): Status: ACTIVE | Noted: 2022-12-18

## 2024-12-13 PROBLEM — K76.0 FATTY LIVER: Status: ACTIVE | Noted: 2022-12-15

## 2024-12-13 LAB — GLUCOSE BLDC GLUCOMTR-MCNC: 85 MG/DL (ref 70–99)

## 2024-12-13 PROCEDURE — 250N000013 HC RX MED GY IP 250 OP 250 PS 637

## 2024-12-13 PROCEDURE — 99222 1ST HOSP IP/OBS MODERATE 55: CPT | Performed by: FAMILY MEDICINE

## 2024-12-13 PROCEDURE — 20936 SP BONE AGRFT LOCAL ADD-ON: CPT | Performed by: SURGERY

## 2024-12-13 PROCEDURE — 63045 LAM FACETEC & FORAMOT CRV: CPT | Mod: 51 | Performed by: SURGERY

## 2024-12-13 PROCEDURE — 0RG4071 FUSION OF CERVICOTHORACIC VERTEBRAL JOINT WITH AUTOLOGOUS TISSUE SUBSTITUTE, POSTERIOR APPROACH, POSTERIOR COLUMN, OPEN APPROACH: ICD-10-PCS | Performed by: SURGERY

## 2024-12-13 PROCEDURE — 22842 INSERT SPINE FIXATION DEVICE: CPT | Performed by: SURGERY

## 2024-12-13 PROCEDURE — 63001 REMOVE SPINE LAMINA 1/2 CRVL: CPT | Mod: AS

## 2024-12-13 PROCEDURE — 61783 SCAN PROC SPINAL: CPT | Mod: 59 | Performed by: SURGERY

## 2024-12-13 PROCEDURE — 63048 LAM FACETEC &FORAMOT EA ADDL: CPT | Mod: AS

## 2024-12-13 PROCEDURE — 120N000001 HC R&B MED SURG/OB

## 2024-12-13 PROCEDURE — 272N000001 HC OR GENERAL SUPPLY STERILE: Performed by: SURGERY

## 2024-12-13 PROCEDURE — 258N000003 HC RX IP 258 OP 636

## 2024-12-13 PROCEDURE — 63001 REMOVE SPINE LAMINA 1/2 CRVL: CPT | Mod: 59 | Performed by: SURGERY

## 2024-12-13 PROCEDURE — 01N10ZZ RELEASE CERVICAL NERVE, OPEN APPROACH: ICD-10-PCS | Performed by: SURGERY

## 2024-12-13 PROCEDURE — 250N000013 HC RX MED GY IP 250 OP 250 PS 637: Performed by: FAMILY MEDICINE

## 2024-12-13 PROCEDURE — C1713 ANCHOR/SCREW BN/BN,TIS/BN: HCPCS | Performed by: SURGERY

## 2024-12-13 PROCEDURE — 370N000017 HC ANESTHESIA TECHNICAL FEE, PER MIN: Performed by: SURGERY

## 2024-12-13 PROCEDURE — 22600 ARTHRD PST TQ 1NTRSPC CRV: CPT | Performed by: SURGERY

## 2024-12-13 PROCEDURE — 250N000025 HC SEVOFLURANE, PER MIN: Performed by: SURGERY

## 2024-12-13 PROCEDURE — 8E0WXBF COMPUTER ASSISTED PROCEDURE OF TRUNK REGION, WITH FLUOROSCOPY: ICD-10-PCS | Performed by: SURGERY

## 2024-12-13 PROCEDURE — 258N000003 HC RX IP 258 OP 636: Performed by: SURGERY

## 2024-12-13 PROCEDURE — 250N000011 HC RX IP 250 OP 636: Performed by: SURGERY

## 2024-12-13 PROCEDURE — 999N000157 HC STATISTIC RCP TIME EA 10 MIN

## 2024-12-13 PROCEDURE — 61783 SCAN PROC SPINAL: CPT | Mod: AS

## 2024-12-13 PROCEDURE — 250N000009 HC RX 250: Performed by: SURGERY

## 2024-12-13 PROCEDURE — 999N000141 HC STATISTIC PRE-PROCEDURE NURSING ASSESSMENT: Performed by: SURGERY

## 2024-12-13 PROCEDURE — 999N000182 XR SURGERY OARM: Mod: TC

## 2024-12-13 PROCEDURE — 63045 LAM FACETEC & FORAMOT CRV: CPT | Mod: AS

## 2024-12-13 PROCEDURE — T1013 SIGN LANG/ORAL INTERPRETER: HCPCS | Mod: U3

## 2024-12-13 PROCEDURE — 0RG1071 FUSION OF CERVICAL VERTEBRAL JOINT WITH AUTOLOGOUS TISSUE SUBSTITUTE, POSTERIOR APPROACH, POSTERIOR COLUMN, OPEN APPROACH: ICD-10-PCS | Performed by: SURGERY

## 2024-12-13 PROCEDURE — 710N000010 HC RECOVERY PHASE 1, LEVEL 2, PER MIN: Performed by: SURGERY

## 2024-12-13 PROCEDURE — 22614 ARTHRD PST TQ 1NTRSPC EA ADD: CPT | Performed by: SURGERY

## 2024-12-13 PROCEDURE — 22614 ARTHRD PST TQ 1NTRSPC EA ADD: CPT | Mod: AS

## 2024-12-13 PROCEDURE — C1762 CONN TISS, HUMAN(INC FASCIA): HCPCS | Performed by: SURGERY

## 2024-12-13 PROCEDURE — 22600 ARTHRD PST TQ 1NTRSPC CRV: CPT | Mod: AS

## 2024-12-13 PROCEDURE — 250N000011 HC RX IP 250 OP 636

## 2024-12-13 PROCEDURE — 22842 INSERT SPINE FIXATION DEVICE: CPT | Mod: AS

## 2024-12-13 PROCEDURE — 360N000079 HC SURGERY LEVEL 6, PER MIN: Performed by: SURGERY

## 2024-12-13 PROCEDURE — 63048 LAM FACETEC &FORAMOT EA ADDL: CPT | Performed by: SURGERY

## 2024-12-13 PROCEDURE — 250N000009 HC RX 250

## 2024-12-13 DEVICE — KIT BNGF 6CC DMNR CORT FBR ACCELERATE GRFTN CNN T50206: Type: IMPLANTABLE DEVICE | Site: POSTERIOR CERVICAL | Status: FUNCTIONAL

## 2024-12-13 DEVICE — IMP SCREW INFINITY SPINE UPPER THOR 22MMX3.5MM 3603522: Type: IMPLANTABLE DEVICE | Site: SPINE CERVICAL | Status: FUNCTIONAL

## 2024-12-13 DEVICE — IMP SCREW INFINITY SPINE MULTIAXIAL14MMX3.5MM 3603514: Type: IMPLANTABLE DEVICE | Site: SPINE CERVICAL | Status: FUNCTIONAL

## 2024-12-13 DEVICE — MAGNETOS EASYPACK PUTTY 2.5CC 1-2MM USA
Type: IMPLANTABLE DEVICE | Site: POSTERIOR CERVICAL | Status: FUNCTIONAL
Brand: MAGNETOS

## 2024-12-13 DEVICE — ROD SPNL 40MM 3.5MM PCUT: Type: IMPLANTABLE DEVICE | Site: SPINE CERVICAL | Status: FUNCTIONAL

## 2024-12-13 DEVICE — SCREW BN 20MM 3.5MM MA NS INFNT SPNE OC UPR THOR LF: Type: IMPLANTABLE DEVICE | Site: SPINE CERVICAL | Status: FUNCTIONAL

## 2024-12-13 DEVICE — IMP SET SCREW MEDTRONIC INFINITY 3600215: Type: IMPLANTABLE DEVICE | Site: SPINE CERVICAL | Status: FUNCTIONAL

## 2024-12-13 RX ORDER — CEFAZOLIN SODIUM/WATER 2 G/20 ML
2 SYRINGE (ML) INTRAVENOUS
Status: COMPLETED | OUTPATIENT
Start: 2024-12-13 | End: 2024-12-13

## 2024-12-13 RX ORDER — DEXAMETHASONE SODIUM PHOSPHATE 4 MG/ML
4 INJECTION, SOLUTION INTRA-ARTICULAR; INTRALESIONAL; INTRAMUSCULAR; INTRAVENOUS; SOFT TISSUE
Status: DISCONTINUED | OUTPATIENT
Start: 2024-12-13 | End: 2024-12-13 | Stop reason: HOSPADM

## 2024-12-13 RX ORDER — SODIUM PHOSPHATE,MONO-DIBASIC 19G-7G/118
1 ENEMA (ML) RECTAL DAILY PRN
COMMUNITY

## 2024-12-13 RX ORDER — LIDOCAINE HYDROCHLORIDE AND EPINEPHRINE 10; 10 MG/ML; UG/ML
INJECTION, SOLUTION INFILTRATION; PERINEURAL
Status: DISCONTINUED
Start: 2024-12-13 | End: 2024-12-13 | Stop reason: HOSPADM

## 2024-12-13 RX ORDER — NALOXONE HYDROCHLORIDE 0.4 MG/ML
0.1 INJECTION, SOLUTION INTRAMUSCULAR; INTRAVENOUS; SUBCUTANEOUS
Status: DISCONTINUED | OUTPATIENT
Start: 2024-12-13 | End: 2024-12-13 | Stop reason: HOSPADM

## 2024-12-13 RX ORDER — FENTANYL CITRATE 50 UG/ML
25 INJECTION, SOLUTION INTRAMUSCULAR; INTRAVENOUS EVERY 5 MIN PRN
Status: DISCONTINUED | OUTPATIENT
Start: 2024-12-13 | End: 2024-12-13 | Stop reason: HOSPADM

## 2024-12-13 RX ORDER — HYDROMORPHONE HYDROCHLORIDE 4 MG/1
4 TABLET ORAL EVERY 4 HOURS PRN
Status: DISCONTINUED | OUTPATIENT
Start: 2024-12-13 | End: 2024-12-16 | Stop reason: HOSPADM

## 2024-12-13 RX ORDER — FENTANYL CITRATE 50 UG/ML
50 INJECTION, SOLUTION INTRAMUSCULAR; INTRAVENOUS
Status: DISCONTINUED | OUTPATIENT
Start: 2024-12-13 | End: 2024-12-13 | Stop reason: HOSPADM

## 2024-12-13 RX ORDER — ATENOLOL 25 MG/1
25 TABLET ORAL DAILY
Status: DISCONTINUED | OUTPATIENT
Start: 2024-12-14 | End: 2024-12-16 | Stop reason: HOSPADM

## 2024-12-13 RX ORDER — HYDROMORPHONE HCL IN WATER/PF 6 MG/30 ML
0.2 PATIENT CONTROLLED ANALGESIA SYRINGE INTRAVENOUS
Status: DISCONTINUED | OUTPATIENT
Start: 2024-12-13 | End: 2024-12-16 | Stop reason: HOSPADM

## 2024-12-13 RX ORDER — CYANOCOBALAMIN (VITAMIN B-12) 500 MCG
1 TABLET ORAL DAILY
COMMUNITY

## 2024-12-13 RX ORDER — FENTANYL CITRATE 50 UG/ML
50 INJECTION, SOLUTION INTRAMUSCULAR; INTRAVENOUS EVERY 5 MIN PRN
Status: DISCONTINUED | OUTPATIENT
Start: 2024-12-13 | End: 2024-12-13 | Stop reason: HOSPADM

## 2024-12-13 RX ORDER — ONDANSETRON 4 MG/1
4 TABLET, ORALLY DISINTEGRATING ORAL EVERY 6 HOURS PRN
Status: DISCONTINUED | OUTPATIENT
Start: 2024-12-13 | End: 2024-12-16 | Stop reason: HOSPADM

## 2024-12-13 RX ORDER — POLYETHYLENE GLYCOL 3350 17 G/17G
17 POWDER, FOR SOLUTION ORAL DAILY
Status: DISCONTINUED | OUTPATIENT
Start: 2024-12-14 | End: 2024-12-16 | Stop reason: HOSPADM

## 2024-12-13 RX ORDER — LEVOTHYROXINE SODIUM 50 UG/1
50 TABLET ORAL DAILY
Status: DISCONTINUED | OUTPATIENT
Start: 2024-12-14 | End: 2024-12-16 | Stop reason: HOSPADM

## 2024-12-13 RX ORDER — LISINOPRIL 5 MG/1
5 TABLET ORAL DAILY
Status: DISCONTINUED | OUTPATIENT
Start: 2024-12-14 | End: 2024-12-16 | Stop reason: HOSPADM

## 2024-12-13 RX ORDER — MULTIVITAMIN,THERAPEUTIC
1 TABLET ORAL DAILY
Status: DISCONTINUED | OUTPATIENT
Start: 2024-12-14 | End: 2024-12-16 | Stop reason: HOSPADM

## 2024-12-13 RX ORDER — NALOXONE HYDROCHLORIDE 0.4 MG/ML
0.2 INJECTION, SOLUTION INTRAMUSCULAR; INTRAVENOUS; SUBCUTANEOUS
Status: DISCONTINUED | OUTPATIENT
Start: 2024-12-13 | End: 2024-12-16 | Stop reason: HOSPADM

## 2024-12-13 RX ORDER — LEVOTHYROXINE SODIUM 50 UG/1
1 TABLET ORAL DAILY
COMMUNITY
Start: 2024-09-25

## 2024-12-13 RX ORDER — CEFAZOLIN SODIUM/WATER 2 G/20 ML
2 SYRINGE (ML) INTRAVENOUS SEE ADMIN INSTRUCTIONS
Status: DISCONTINUED | OUTPATIENT
Start: 2024-12-13 | End: 2024-12-13 | Stop reason: HOSPADM

## 2024-12-13 RX ORDER — HYDROMORPHONE HYDROCHLORIDE 2 MG/1
2 TABLET ORAL EVERY 4 HOURS PRN
Status: DISCONTINUED | OUTPATIENT
Start: 2024-12-13 | End: 2024-12-16 | Stop reason: HOSPADM

## 2024-12-13 RX ORDER — ACETAMINOPHEN 325 MG/1
650 TABLET ORAL EVERY 4 HOURS PRN
Status: DISCONTINUED | OUTPATIENT
Start: 2024-12-16 | End: 2024-12-16 | Stop reason: HOSPADM

## 2024-12-13 RX ORDER — OXYCODONE HYDROCHLORIDE 5 MG/1
5 TABLET ORAL EVERY 4 HOURS PRN
Status: DISCONTINUED | OUTPATIENT
Start: 2024-12-13 | End: 2024-12-16

## 2024-12-13 RX ORDER — HYDROMORPHONE HCL IN WATER/PF 6 MG/30 ML
0.4 PATIENT CONTROLLED ANALGESIA SYRINGE INTRAVENOUS EVERY 5 MIN PRN
Status: DISCONTINUED | OUTPATIENT
Start: 2024-12-13 | End: 2024-12-13 | Stop reason: HOSPADM

## 2024-12-13 RX ORDER — PRAMIPEXOLE DIHYDROCHLORIDE 0.5 MG/1
0.5 TABLET ORAL 2 TIMES DAILY
Status: DISCONTINUED | OUTPATIENT
Start: 2024-12-13 | End: 2024-12-16 | Stop reason: HOSPADM

## 2024-12-13 RX ORDER — AMOXICILLIN 250 MG
1 CAPSULE ORAL 2 TIMES DAILY
Status: DISCONTINUED | OUTPATIENT
Start: 2024-12-13 | End: 2024-12-16 | Stop reason: HOSPADM

## 2024-12-13 RX ORDER — LORATADINE 10 MG/1
10 TABLET ORAL DAILY PRN
Status: DISCONTINUED | OUTPATIENT
Start: 2024-12-13 | End: 2024-12-16 | Stop reason: HOSPADM

## 2024-12-13 RX ORDER — ALLOPURINOL 100 MG/1
200 TABLET ORAL DAILY
Status: DISCONTINUED | OUTPATIENT
Start: 2024-12-14 | End: 2024-12-16 | Stop reason: HOSPADM

## 2024-12-13 RX ORDER — SODIUM PHOSPHATE,MONO-DIBASIC 19G-7G/118
1 ENEMA (ML) RECTAL DAILY PRN
Status: DISCONTINUED | OUTPATIENT
Start: 2024-12-13 | End: 2024-12-16 | Stop reason: HOSPADM

## 2024-12-13 RX ORDER — HYDROMORPHONE HCL IN WATER/PF 6 MG/30 ML
0.4 PATIENT CONTROLLED ANALGESIA SYRINGE INTRAVENOUS
Status: DISCONTINUED | OUTPATIENT
Start: 2024-12-13 | End: 2024-12-16 | Stop reason: HOSPADM

## 2024-12-13 RX ORDER — PROCHLORPERAZINE MALEATE 10 MG
10 TABLET ORAL EVERY 6 HOURS PRN
Status: DISCONTINUED | OUTPATIENT
Start: 2024-12-13 | End: 2024-12-16 | Stop reason: HOSPADM

## 2024-12-13 RX ORDER — LIDOCAINE 40 MG/G
CREAM TOPICAL
Status: DISCONTINUED | OUTPATIENT
Start: 2024-12-13 | End: 2024-12-13 | Stop reason: HOSPADM

## 2024-12-13 RX ORDER — SODIUM CHLORIDE, SODIUM LACTATE, POTASSIUM CHLORIDE, CALCIUM CHLORIDE 600; 310; 30; 20 MG/100ML; MG/100ML; MG/100ML; MG/100ML
INJECTION, SOLUTION INTRAVENOUS CONTINUOUS
Status: DISCONTINUED | OUTPATIENT
Start: 2024-12-13 | End: 2024-12-13 | Stop reason: HOSPADM

## 2024-12-13 RX ORDER — NALOXONE HYDROCHLORIDE 0.4 MG/ML
0.4 INJECTION, SOLUTION INTRAMUSCULAR; INTRAVENOUS; SUBCUTANEOUS
Status: DISCONTINUED | OUTPATIENT
Start: 2024-12-13 | End: 2024-12-16 | Stop reason: HOSPADM

## 2024-12-13 RX ORDER — ACETAMINOPHEN 325 MG/1
975 TABLET ORAL ONCE
Status: DISCONTINUED | OUTPATIENT
Start: 2024-12-13 | End: 2024-12-13 | Stop reason: HOSPADM

## 2024-12-13 RX ORDER — OXYCODONE HYDROCHLORIDE 5 MG/1
10 TABLET ORAL EVERY 4 HOURS PRN
Status: DISCONTINUED | OUTPATIENT
Start: 2024-12-13 | End: 2024-12-16

## 2024-12-13 RX ORDER — OMEGA-3S/DHA/EPA/FISH OIL/D3 300MG-1000
1 CAPSULE ORAL DAILY
COMMUNITY

## 2024-12-13 RX ORDER — GINSENG 100 MG
CAPSULE ORAL
Status: DISCONTINUED
Start: 2024-12-13 | End: 2024-12-13 | Stop reason: HOSPADM

## 2024-12-13 RX ORDER — ONDANSETRON 2 MG/ML
4 INJECTION INTRAMUSCULAR; INTRAVENOUS EVERY 6 HOURS PRN
Status: DISCONTINUED | OUTPATIENT
Start: 2024-12-13 | End: 2024-12-16 | Stop reason: HOSPADM

## 2024-12-13 RX ORDER — SODIUM CHLORIDE 9 MG/ML
INJECTION, SOLUTION INTRAVENOUS CONTINUOUS
Status: DISCONTINUED | OUTPATIENT
Start: 2024-12-13 | End: 2024-12-15

## 2024-12-13 RX ORDER — GINSENG 100 MG
CAPSULE ORAL PRN
Status: DISCONTINUED | OUTPATIENT
Start: 2024-12-13 | End: 2024-12-13 | Stop reason: HOSPADM

## 2024-12-13 RX ORDER — TACROLIMUS 1 MG/G
OINTMENT TOPICAL 2 TIMES DAILY PRN
COMMUNITY
Start: 2024-09-10

## 2024-12-13 RX ORDER — TRIAMCINOLONE ACETONIDE 0.25 MG/G
OINTMENT TOPICAL 2 TIMES DAILY PRN
COMMUNITY
Start: 2024-09-09

## 2024-12-13 RX ORDER — OXYCODONE HYDROCHLORIDE 5 MG/1
10 TABLET ORAL EVERY 4 HOURS PRN
Status: DISCONTINUED | OUTPATIENT
Start: 2024-12-13 | End: 2024-12-13 | Stop reason: HOSPADM

## 2024-12-13 RX ORDER — CEFAZOLIN SODIUM 1 G/3ML
1 INJECTION, POWDER, FOR SOLUTION INTRAMUSCULAR; INTRAVENOUS EVERY 8 HOURS
Status: COMPLETED | OUTPATIENT
Start: 2024-12-13 | End: 2024-12-14

## 2024-12-13 RX ORDER — GABAPENTIN 300 MG/1
300 CAPSULE ORAL
Status: COMPLETED | OUTPATIENT
Start: 2024-12-13 | End: 2024-12-13

## 2024-12-13 RX ORDER — METHOCARBAMOL 750 MG/1
750 TABLET, FILM COATED ORAL EVERY 6 HOURS PRN
Status: DISCONTINUED | OUTPATIENT
Start: 2024-12-13 | End: 2024-12-15

## 2024-12-13 RX ORDER — HYDROMORPHONE HCL IN WATER/PF 6 MG/30 ML
0.2 PATIENT CONTROLLED ANALGESIA SYRINGE INTRAVENOUS EVERY 5 MIN PRN
Status: DISCONTINUED | OUTPATIENT
Start: 2024-12-13 | End: 2024-12-13 | Stop reason: HOSPADM

## 2024-12-13 RX ORDER — OXYCODONE HYDROCHLORIDE 5 MG/1
5 TABLET ORAL EVERY 4 HOURS PRN
Status: DISCONTINUED | OUTPATIENT
Start: 2024-12-13 | End: 2024-12-13 | Stop reason: HOSPADM

## 2024-12-13 RX ORDER — BISACODYL 10 MG
10 SUPPOSITORY, RECTAL RECTAL DAILY PRN
Status: DISCONTINUED | OUTPATIENT
Start: 2024-12-16 | End: 2024-12-16 | Stop reason: HOSPADM

## 2024-12-13 RX ORDER — ALLOPURINOL 100 MG/1
200 TABLET ORAL DAILY
COMMUNITY

## 2024-12-13 RX ORDER — ONDANSETRON 4 MG/1
4 TABLET, ORALLY DISINTEGRATING ORAL EVERY 30 MIN PRN
Status: DISCONTINUED | OUTPATIENT
Start: 2024-12-13 | End: 2024-12-13 | Stop reason: HOSPADM

## 2024-12-13 RX ORDER — GABAPENTIN 300 MG/1
600 CAPSULE ORAL 2 TIMES DAILY
Status: DISCONTINUED | OUTPATIENT
Start: 2024-12-13 | End: 2024-12-16 | Stop reason: HOSPADM

## 2024-12-13 RX ORDER — HYDROCODONE BITARTRATE AND ACETAMINOPHEN 5; 325 MG/1; MG/1
1 TABLET ORAL EVERY 6 HOURS PRN
Status: ON HOLD | COMMUNITY
Start: 2024-12-11 | End: 2024-12-16

## 2024-12-13 RX ORDER — ONDANSETRON 2 MG/ML
4 INJECTION INTRAMUSCULAR; INTRAVENOUS EVERY 30 MIN PRN
Status: DISCONTINUED | OUTPATIENT
Start: 2024-12-13 | End: 2024-12-13 | Stop reason: HOSPADM

## 2024-12-13 RX ORDER — ACETAMINOPHEN 325 MG/1
975 TABLET ORAL EVERY 8 HOURS
Status: COMPLETED | OUTPATIENT
Start: 2024-12-13 | End: 2024-12-16

## 2024-12-13 RX ADMIN — PRAMIPEXOLE DIHYDROCHLORIDE 0.5 MG: 0.5 TABLET ORAL at 21:55

## 2024-12-13 RX ADMIN — HYDROMORPHONE HYDROCHLORIDE 0.4 MG: 0.2 INJECTION, SOLUTION INTRAMUSCULAR; INTRAVENOUS; SUBCUTANEOUS at 15:56

## 2024-12-13 RX ADMIN — ACETAMINOPHEN 975 MG: 325 TABLET ORAL at 22:03

## 2024-12-13 RX ADMIN — METHOCARBAMOL TABLETS 750 MG: 750 TABLET, COATED ORAL at 19:00

## 2024-12-13 RX ADMIN — SODIUM CHLORIDE, POTASSIUM CHLORIDE, SODIUM LACTATE AND CALCIUM CHLORIDE: 600; 310; 30; 20 INJECTION, SOLUTION INTRAVENOUS at 07:31

## 2024-12-13 RX ADMIN — SODIUM CHLORIDE: 9 INJECTION, SOLUTION INTRAVENOUS at 11:53

## 2024-12-13 RX ADMIN — HYDROMORPHONE HYDROCHLORIDE 0.4 MG: 0.2 INJECTION, SOLUTION INTRAMUSCULAR; INTRAVENOUS; SUBCUTANEOUS at 19:01

## 2024-12-13 RX ADMIN — ACETAMINOPHEN 975 MG: 325 TABLET ORAL at 15:46

## 2024-12-13 RX ADMIN — GABAPENTIN 600 MG: 300 CAPSULE ORAL at 21:55

## 2024-12-13 RX ADMIN — SENNOSIDES AND DOCUSATE SODIUM 1 TABLET: 50; 8.6 TABLET ORAL at 21:55

## 2024-12-13 RX ADMIN — CEFAZOLIN 1 G: 1 INJECTION, POWDER, FOR SOLUTION INTRAMUSCULAR; INTRAVENOUS at 15:43

## 2024-12-13 ASSESSMENT — ACTIVITIES OF DAILY LIVING (ADL)
ADLS_ACUITY_SCORE: 44
ADLS_ACUITY_SCORE: 44
ADLS_ACUITY_SCORE: 47
ADLS_ACUITY_SCORE: 47
ADLS_ACUITY_SCORE: 44
ADLS_ACUITY_SCORE: 44
ADLS_ACUITY_SCORE: 45
ADLS_ACUITY_SCORE: 46
ADLS_ACUITY_SCORE: 41
ADLS_ACUITY_SCORE: 47
ADLS_ACUITY_SCORE: 47
ADLS_ACUITY_SCORE: 41
ADLS_ACUITY_SCORE: 45
ADLS_ACUITY_SCORE: 45
ADLS_ACUITY_SCORE: 46

## 2024-12-13 NOTE — BRIEF OP NOTE
[facility]    Brief Operative Note    Pre-operative diagnosis: Cervical radiculopathy [M54.12]  Cervical spondylosis with myelopathy [M47.12]  Post-operative diagnosis Same as pre-operative diagnosis    Procedure: cervical 2 laminectomy and, N/A - Spine  right cervical 6-cervical 7 and bilateral cervical 7-thoracic 1 foraminotomies with cervical 6-thoracic 1 posterior instrumented fusion and arthrodesis with use of stealth., Bilateral - Spine    Surgeon: Surgeons and Role:     * Geneva Abel MD - Primary     * Ora Rayo APRN CNP  Anesthesia: General   Estimated Blood Loss: 150 cc    Drains: Tomas-[place]  Specimens: * No specimens in log *  Findings:   None.  Complications: None.  Implants:   Implant Name Type Inv. Item Serial No.  Lot No. LRB No. Used Action   KIT BNGF 6CC DMNR HOLLEY FBR ACCELERATE GRFTN [facility] [id number] - [id number] Bone/Tissue/Biologic KIT BNGF 6CC DMNR HOLLEY FBR ACCELERATE FTN [facility] [id number] [employer]  N/A 1 Implanted   BONE GRAFT PUTTY MAGNETOS EASYPACK PUTTY 2.5CC 703-050-US - [id number] Bone/Tissue Synthetic BONE GRAFT PUTTY MAGNETOS EASYPACK PUTTY 2.5CC 703-050-US  [employer]  N/A 1 Implanted   IMP SET SCREW [employer] INFINITY [id number] - [id number] Metallic Hardware/[place] IMP SET SCREW [employer] INFINITY [id number]  [employer]DANEK  N/A 6 Implanted   IMP SCREW INFINITY SPINE [id number] - [id number] Metallic Hardware/[place] IMP SCREW INFINITY SPINE [id number]  [employer]  N/A 2 Implanted   SCREW BN 20MM 3.5MM MA NS INFNT SPNE OC UPR THOR LF - [id number] Metallic Hardware/[place] SCREW BN 20MM 3.5MM MA NS INFNT SPNE OC UPR THOR LF  [employer]  N/A 3 Implanted   IMP SCREW INFINITY SPINE UPPER THOR 22MMX3.5MM [id number] - [id number] Metallic Hardware/[place] IMP SCREW INFINITY SPINE UPPER THOR 22MMX3.5MM [id number]  [employer]  N/A 1 Implanted   NAIF SPNL 40MM 3.5MM PCUT - [id number]  Metallic Hardware/[place] NAIF SPNL 40MM 3.5MM PCUT  [employer]  N/A 2 Implanted

## 2024-12-13 NOTE — PROVIDER NOTIFICATION
Per clinton Cuellar for patient to transfer to inpatient floor in Sinus tachycardia with . Writer verbalized understanding.

## 2024-12-13 NOTE — PROGRESS NOTES
POD0 cervical 2 laminectomy and right cervical 6-cervical 7 and bilateral cervical 7-thoracic 1 foraminotomies with cervical 6-thoracic 1 posterior instrumented fusion and arthrodesis with use of stealth    Plan:  -[place] [facility] when OOB  -DIPIT drain, monitor and record output even if 0  -Abx x 3 doses  -Lovenox 48 hours post op   -Imaging in AM  -PT/OT  -Hospitalist consult    Ora Rayo CNP  [facility] Neurosurgery  [address] [place] [id number]  Tel [phone]  Fax [phone]

## 2024-12-13 NOTE — CONSULTS
[facility] MEDICINE CONSULT NOTE   Physician requesting consult: Geneva Abel MD    Reason for consult: Postoperative medical management of medical co-morbidities as below    Assessment and Plan:   Torsten La is a 64 year old male with a PMH of hearing loss uses , IVELISSE not on CPAP, HTN, constipation, chronic kidney disease stage IIIa, prediabetes, RLS, GERD, hypothyroidism, depression, gout, chronic hep B and mild cognitive impairment.  Underwent C2 laminectomy with multiple level cervical fusion.  Seen with .      Status post C2 laminectomy with multiple level cervical fusion  Postoperative management per surgery.  Ordered home gabapentin 600 mg twice daily.  Preoperative hemoglobin 16.4.    Obstructive sleep apnea  Patient has not worn his CPAP for the last 3 months due to problems with the device.  Waiting for a replacement.  Monitor pulse oximetry per standard protocols and provide supplemental oxygen/CPAP per standard protocols.    Chronic constipation  Will order patient's home Linzess and receive a dose now.  Continue standard postop daily MiraLAX, senna docusate.  Order home as needed Fleet enema.  Follow bowel movements closely.    Essential hypertension  Order home atenolol and lisinopril with hold parameters.    Chronic kidney disease stage IIIa  Preoperative creatinine 1.32 and potassium 4.3.  Receive IV fluids during the evening.  Recheck BMP in the AM.    Restless leg syndrome  Depression  Order home Mirapex.    Hypothyroidism  Order home levothyroxine.    Gout  Order home allopurinol.    Prediabetes  Preoperative glucose 85.  Recheck glucose in the AM.  If unremarkable no further checks indicated.    History of chronic hepatitis B  Noted.      Anticoagulation.  Ambulation and SCDs ordered by surgery.  Routine postoperative risk.    Fluids: NS at 75 mL/h  Pain meds: Per surgery  Therapy: Per surgery  Trevizo:PRESENT, indication: Surgical  procedure  Lines: None       Current Diet  None    Supplements  None    Lines/Drains/Airways       PIV Line  Duration             Peripheral IV 12/13/24 Left Hand <1 day    Peripheral IV 12/13/24 Right;Posterior Hand;Wrist <1 day              Drain  Duration             Closed/Suction Drain Posterior Neck Bulb 10 Danish <1 day    Urethral Catheter 12/13/24 Latex;Coude 14 fr <1 day              Wound  Duration             Incision/Surgical Site 12/28/23 Anterior Pubis 350 days    Incision/Surgical Site 12/13/24 Cervical spine <1 day    Incision/Surgical Site 12/13/24 Left;Lateral Scalp <1 day    Incision/Surgical Site 12/13/24 Right;Lateral Scalp <1 day                    Disposition  Per surgery    Code status:No Order   HMS service was asked to evaluate patient for postoperative medical management as follows below. Please resume the home medications as reconciled and further noted with ordered hold parameters.  Thank you for this consult; we will continue to follow this patient until discharge.    Procedure(s):  cervical 2 laminectomy and  right cervical 6-cervical 7 and bilateral cervical 7-thoracic 1 foraminotomies with cervical 6-thoracic 1 posterior instrumented fusion and arthrodesis with use of stealth.  Day of Surgery  Estimated Blood Loss:  * No values recorded between 12/13/2024  8:16 AM and 12/13/2024 11:10 AM *  Hospital Problem List   No problem-specific Assessment & Plan notes found for this encounter.    Active Problems:    S/P cervical spinal fusion      -Reviewed the patient's preoperative H and P and updated missing elements.  -Home medication reconciliation has been reviewed.  Medications have been ordered as noted from the home list and changes are documented above     Clinically Significant Risk Factors Present on Admission                   # Hypertension: Home medication list includes antihypertensive(s)           # Obesity: Estimated body mass index is 34.18 kg/m  as calculated from the  following:    Height as of this encounter: 1.524 m (5').    Weight as of this encounter: 79.4 kg (175 lb).              HISTORY     Torsten La is a 64 year old male with PMH of hearing loss uses , IVELISSE not on CPAP, HTN, constipation, chronic kidney disease stage IIIa, prediabetes, RLS, GERD, hypothyroidism, depression, gout, chronic hep B and mild cognitive impairment.  Underwent C2 laminectomy with multiple level cervical fusion.  Seen with ..  Postoperatively patient doing okay.  Has a very dry mouth.  Is interested in having something to eat and drink.  Denies any chest pain shortness of breath.  Did have some nausea but doing better.  Confirmed past medical history with patient and family.  He has been without his CPAP for the last 3 months that is broken and he is waiting for a replacement device.  Denies personal history of heart attack, stroke, cancer, DVT, PE, peptic ulcer disease.  Son is present and supportive.  Questions answered to verbalized satisfaction.    Past Medical History     Past Medical History:  No date: Depressive disorder  No date: Gastroesophageal reflux disease  No date: Gout  No date: Hypertension  No date: Sleep apnea  No date: Thyroid disease     Patient Active Problem List    Diagnosis Date Noted     S/P cervical spinal fusion 12/13/2024     Priority: Medium     Depression, unspecified depression type 08/07/2019     Priority: Medium     Surgical History     Past Surgical History:   Procedure Laterality Date     GENITOURINARY SURGERY       IMPLANT PROSTHESIS PENIS INFLATABLE N/A 12/28/2023    Procedure: PLACEMENT OF INFLATABLE PENILE PROSTHESIS;  Surgeon: Henok Harden MD;  Location: [facility] Main OR     Family History    History reviewed. No pertinent family history.   Social History      Social History     Tobacco Use     Smoking status: Never   Substance Use Topics     Alcohol use: Not Currently     Comment: small amount over Yeyo     Drug use:  Never      Allergies     Allergies   Allergen Reactions     Duloxetine      nightmares       Fluoxetine Nausea     Other reaction(s): GI Upset     Metoprolol Headache     Atenolol ok  Stopped due to persistent headache--tolerates atenolol without problem       Sertraline Unknown     Paroxetine Other (See Comments)     Other reaction(s): Impotence         Prior to Admission Medications      Prior to Admission Medications   Prescriptions Last Dose Informant Patient Reported? Taking?   Cyanocobalamin (VITAMIN B 12) 500 MCG TABS 12/7/2024  Yes Yes   Sig: Take 1 tablet by mouth daily.   HYDROcodone-acetaminophen (NORCO) 5-325 MG tablet 12/13/2024 at  5:00 AM  Yes Yes   Sig: Take 1 tablet by mouth every 6 hours as needed for pain.   LINZESS 290 MCG capsule 12/12/2024  Yes Yes   Sig: Take 290 mcg by mouth every morning   Omega-3 1000 MG capsule 11/22/2024  Yes Yes   Sig: Take 1 g by mouth daily.   Vitamin D3 (VITAMIN D) 10 MCG (400 UNIT) tablet   Yes No   Sig: Take 1 tablet by mouth daily.   acetaminophen (TYLENOL) 500 MG tablet 12/13/2024 at  5:00 AM  Yes Yes   Sig: Take 1,000 mg by mouth 2 times daily.   allopurinol (ZYLOPRIM) 100 MG tablet 12/13/2024 at  5:00 AM  Yes Yes   Sig: Take 200 mg by mouth daily.   atenolol (TENORMIN) 25 MG tablet 12/13/2024 at  5:00 AM  Yes Yes   Sig: Take 25 mg by mouth daily   gabapentin (NEURONTIN) 300 MG capsule Unknown  Yes Yes   Sig: Take 300-600 mg by mouth daily as needed. May take midday In addition to scheduled dosing   gabapentin (NEURONTIN) 300 MG capsule 12/13/2024 at  5:00 AM  Yes Yes   Sig: Take 600 mg by mouth 2 times daily.   levothyroxine (SYNTHROID/LEVOTHROID) 50 MCG tablet 12/13/2024 at  5:00 AM  Yes Yes   Sig: Take 1 tablet by mouth daily.   lisinopril (ZESTRIL) 5 MG tablet 12/12/2024  Yes Yes   Sig: Take 5 mg by mouth daily   methocarbamol (ROBAXIN) 500 MG tablet 12/13/2024 at  5:00 AM  Yes Yes   Sig: Take 1,000 mg by mouth 2 times daily   polyethylene glycol (MIRALAX) 17  g packet 12/11/2024  Yes Yes   Sig: Take 1 packet by mouth daily   pramipexole (MIRAPEX) 0.25 MG tablet 12/13/2024 at  5:00 AM  Yes Yes   Sig: Take 0.5 mg by mouth 2 times daily   sodium phosphate (FLEET ENEMA) 7-19 GM/118ML rectal enema 12/12/2024 Evening  Yes Yes   Sig: Place 1 enema rectally daily as needed for constipation.   sulfamethoxazole-trimethoprim (BACTRIM DS) 800-160 MG tablet   No Yes   Sig: Take 1 tablet by mouth 2 times daily   tacrolimus (PROTOPIC) 0.1 % external ointment   Yes Yes   Sig: Apply topically 2 times daily as needed. Apply to eye brows   traMADol (ULTRAM) 50 MG tablet 12/13/2024 at  5:00 AM  Yes Yes   Sig: Take  mg by mouth 3 times daily as needed for pain.   triamcinolone (KENALOG) 0.025 % external ointment   Yes Yes   Sig: Apply topically 2 times daily as needed. Apply to lips      Facility-Administered Medications: None      Review of Systems     A 12 point comprehensive review of systems was negative except as noted above in HPI.    OBJECTIVE         Physical Exam   Temp:  [97.5  F (36.4  C)-98.9  F (37.2  C)] 98.9  F (37.2  C)  Pulse:  [] 103  Resp:  [10-20] 10  BP: ()/(55-78) 117/70  SpO2:  [93 %-100 %] 93 %  Body mass index is 34.18 kg/m .  Constitutional: awake, alert, cooperative, no apparent distress, and appears stated age and fatigued  Eyes: Lids and lashes normal, pupils equal, round and reactive to light, extra ocular muscles intact, sclera clear, conjunctiva normal  ENT: Limited exam as c-collar is in place.  Hematologic / Lymphatic: no cervical lymphadenopathy and no supraclavicular lymphadenopathy  Respiratory: No increased work of breathing, good air exchange, clear to auscultation bilaterally, no crackles or wheezing  Cardiovascular: Normal apical impulse, regular rate and rhythm, normal S1 and S2, no S3 or S4, and no murmur noted  GI: No scars, normal bowel sounds, soft, non-distended, non-tender, no masses palpated, no hepatosplenomegally  Skin:  normal skin color, texture, turgor, no redness, warmth, or swelling, and no rashes  Musculoskeletal: There is no redness, warmth, or swelling of the joints.  Full range of motion noted.  Motor strength is 5 out of 5 all extremities bilaterally.  Tone is normal. no lower extremity pitting edema present  Neurologic: Cranial nerves II-XII are grossly intact. Sensory:  Sensory intact  Neuropsychiatric: General: normal, calm, and normal eye contact Level of consciousness: drowsy Affect: normal Orientation: oriented to self, place, time and situation Memory and insight: normal, memory for past and recent events intact, and thought process normal      Imaging Reviewed Personally By Myself      Radiology Results:   Recent Results (from the past 24 hours)   XR Surgery OARM    Narrative    This exam was marked as non-reportable because it will not be read by a   radiologist or a [facility] non-radiologist provider.             Labs Reviewed Personally By Myself     Results for orders placed or performed during the hospital encounter of 12/13/24 (from the past 24 hours)   Glucose by meter   Result Value Ref Range    GLUCOSE BY METER POCT 85 70 - 99 mg/dL   XR Surgery OARM    Narrative    This exam was marked as non-reportable because it will not be read by a   radiologist or a [facility] non-radiologist provider.             Preoperative Labs Reviewed Personally By Myself       HEMOGLOBIN (11/25/2024 12:01 PM CST)  Results - HEMOGLOBIN (11/25/2024 12:01 PM CST)  Component Value Ref Range Test Method Analysis Time Performed At Pathologist Signature   HEMOGLOBIN 16.4 13.2 - 17.1 g/dL   11/26/2024 2:38 AM CST [facility]       Results - HEMOGLOBIN (11/25/2024 12:01 PM CST)  Specimen (Source) Anatomical Location / Laterality Collection Method / Volume Collection Time Received Time   Blood BLOOD SPECIMEN / Unknown Venipuncture / Unknown 11/25/2024 12:01 PM CST 11/25/2024 12:02 PM CST     Results - HEMOGLOBIN (11/25/2024 12:01 PM  CST)  Narrative         Results - HEMOGLOBIN (11/25/2024 12:01 PM CST)  Authorizing Provider Result Type Result Status   Meg Tinajero MD HEMATOLOGY Final Result     Results - HEMOGLOBIN (11/25/2024 12:01 PM CST)  Performing Organization Address City/[facility]/ZIP Code Phone Number   [place]  [facility]    [address] [place] [address], [place]  [phone]       Back to top of Results       POTASSIUM (11/25/2024 12:01 PM CST)  Results - POTASSIUM (11/25/2024 12:01 PM CST)  Component Value Ref Range Test Method Analysis Time Performed At Pathologist Signature   POTASSIUM 4.3 3.5 - 5.3 mmol/L   11/26/2024 7:27 AM CST [facility]       Results - POTASSIUM (11/25/2024 12:01 PM CST)  Specimen (Source) Anatomical Location / Laterality Collection Method / Volume Collection Time Received Time   Blood BLOOD SPECIMEN / Unknown Venipuncture / Unknown 11/25/2024 12:01 PM CST 11/25/2024 12:02 PM CST     Results - POTASSIUM (11/25/2024 12:01 PM CST)  Narrative         Results - POTASSIUM (11/25/2024 12:01 PM CST)  Authorizing Provider Result Type Result Status   Meg Tinajero MD CHEMISTRY Final Result     Results - POTASSIUM (11/25/2024 12:01 PM CST)  Performing Organization Address City/[facility] Code Phone Number   [place]  [address] [address], [place]  [phone]       Back to top of Results       CREATININE (11/25/2024 12:01 PM CST)  Results - CREATININE (11/25/2024 12:01 PM CST)  Component Value Ref Range Test Method Analysis Time Performed At Pathologist Signature   CREATININE 1.32 0.70 - 1.35 mg/dL   11/26/2024 7:27 AM CST [facility]     EGFR 61 > OR = 60 mL/min/1.73m2   11/26/2024 7:27 AM CST [facility]       Results - CREATININE (11/25/2024 12:01 PM CST)  Specimen (Source) Anatomical Location / Laterality Collection Method / Volume Collection Time Received Time   Blood BLOOD SPECIMEN / Unknown  Venipuncture / Unknown 11/25/2024 12:01 PM CST 11/25/2024 12:02 PM CST     Results - CREATININE (11/25/2024 12:01 PM CST)  Narrative         Results - CREATININE (11/25/2024 12:01 PM CST)  Authorizing Provider Result Type Result Status   Meg Tinajero MD CHEMISTRY Final Result     Results - CREATININE (11/25/2024 12:01 PM CST)  Performing Organization Address City/State/ZIP Code Phone Number   [place]  [facility]    [address] [place] [address],   [phone]       Back to top of Results       SCAN-FECAL TEST NON-DNA (FIT/FOBT/IFOBT) (11/21/2024 12:00 AM CST)  Results - SCAN-FECAL TEST NON-DNA (FIT/FOBT/IFOBT) (11/21/2024 12:00 AM CST)  Narrative               Thank you for this consultation.  Appreciate the opportunity to participate in the care of Torsten La, please feel free to contact us for any questions or concerns.    Benji Spaulding MD  Hospitalist  [facility]  [facility]  Phone: #[phone]

## 2024-12-13 NOTE — PLAN OF CARE
Pt arrived to floor at 1330. Pt is deaf ALS  came up with pt from surgery. Pt has baseline numbness in fingers and feet which is baseline otherwise CMS is intact. Trevizo patent and draining. DIPTI to suction with bright red blood for output. NS @ 75ml/hr. On clears. Dressing C/D/I with collar on. Has drainage on left side of scalp was noted during surgery when surgeon used some type of nerve stimulator instrument.        Melida Lewis RN on 12/13/2024 at 2:42 PM

## 2024-12-13 NOTE — PROGRESS NOTES
Writer check on pt regarding CPAP used, family at bedside. Pt reports not using his home CPAP anymore, MD notified.    Amado Lemos, RT

## 2024-12-13 NOTE — PHARMACY-ADMISSION MEDICATION HISTORY
Pharmacist Admission Medication History    Admission medication history is complete. The information provided in this note is only as accurate as the sources available at the time of the update.    Information Source(s): Patient and CareEverywhere/SureScripts via in-person    Pertinent Information:     Allergies reviewed with patient and updates made in EHR: yes    Medication History Completed By: Duyen Koenig, BCPS 12/13/2024 7:25 AM    PTA Med List   Medication Sig Last Dose/Taking    acetaminophen (TYLENOL) 500 MG tablet Take 1,000 mg by mouth 2 times daily. 12/13/2024 at  5:00 AM    allopurinol (ZYLOPRIM) 100 MG tablet Take 200 mg by mouth daily. 12/13/2024 at  5:00 AM    atenolol (TENORMIN) 25 MG tablet Take 25 mg by mouth daily 12/13/2024 at  5:00 AM    Cyanocobalamin (VITAMIN B 12) 500 MCG TABS Take 1 tablet by mouth daily. 12/7/2024    gabapentin (NEURONTIN) 300 MG capsule Take 600 mg by mouth 2 times daily. 12/13/2024 at  5:00 AM    gabapentin (NEURONTIN) 300 MG capsule Take 300-600 mg by mouth daily as needed. May take midday In addition to scheduled dosing Unknown    HYDROcodone-acetaminophen (NORCO) 5-325 MG tablet Take 1 tablet by mouth every 6 hours as needed for pain. 12/13/2024 at  5:00 AM    levothyroxine (SYNTHROID/LEVOTHROID) 50 MCG tablet Take 1 tablet by mouth daily. 12/13/2024 at  5:00 AM    LINZESS 290 MCG capsule Take 290 mcg by mouth every morning 12/12/2024    lisinopril (ZESTRIL) 5 MG tablet Take 5 mg by mouth daily 12/12/2024    methocarbamol (ROBAXIN) 500 MG tablet Take 1,000 mg by mouth 2 times daily 12/13/2024 at  5:00 AM    Omega-3 1000 MG capsule Take 1 g by mouth daily. 11/22/2024    polyethylene glycol (MIRALAX) 17 g packet Take 1 packet by mouth daily 12/11/2024    pramipexole (MIRAPEX) 0.25 MG tablet Take 0.5 mg by mouth 2 times daily 12/13/2024 at  5:00 AM    sodium phosphate (FLEET ENEMA) 7-19 GM/118ML rectal enema Place 1 enema rectally daily as needed for  constipation. 12/12/2024 Evening    tacrolimus (PROTOPIC) 0.1 % external ointment Apply topically 2 times daily as needed. Apply to eye brows Taking As Needed    traMADol (ULTRAM) 50 MG tablet Take  mg by mouth 3 times daily as needed for pain. 12/13/2024 at  5:00 AM    triamcinolone (KENALOG) 0.025 % external ointment Apply topically 2 times daily as needed. Apply to lips Taking As Needed

## 2024-12-13 NOTE — INTERVAL H&P NOTE
I have reviewed the surgical (or preoperative) H&P that is linked to this encounter, and examined the patient. There are no significant changes    Clinical Conditions Present on Arrival:  Clinically Significant Risk Factors Present on Admission                       # Obesity: Estimated body mass index is 34.18 kg/m  as calculated from the following:    Height as of this encounter: 1.524 m (5').    Weight as of this encounter: 79.4 kg (175 lb).

## 2024-12-14 ENCOUNTER — APPOINTMENT (OUTPATIENT)
Dept: OCCUPATIONAL THERAPY | Facility: CLINIC | Age: 64
DRG: 472 | End: 2024-12-14
Payer: COMMERCIAL

## 2024-12-14 LAB
ANION GAP SERPL CALCULATED.3IONS-SCNC: 13 MMOL/L (ref 7–15)
BUN SERPL-MCNC: 17 MG/DL (ref 8–23)
CALCIUM SERPL-MCNC: 9 MG/DL (ref 8.8–10.4)
CHLORIDE SERPL-SCNC: 107 MMOL/L (ref 98–107)
CREAT SERPL-MCNC: 1.01 MG/DL (ref 0.67–1.17)
EGFRCR SERPLBLD CKD-EPI 2021: 84 ML/MIN/1.73M2
GLUCOSE SERPL-MCNC: 115 MG/DL (ref 70–99)
GLUCOSE SERPL-MCNC: 115 MG/DL (ref 70–99)
HCO3 SERPL-SCNC: 21 MMOL/L (ref 22–29)
HOLD SPECIMEN: NORMAL
POTASSIUM SERPL-SCNC: 4.2 MMOL/L (ref 3.4–5.3)
SODIUM SERPL-SCNC: 141 MMOL/L (ref 135–145)

## 2024-12-14 PROCEDURE — 97166 OT EVAL MOD COMPLEX 45 MIN: CPT | Mod: GO

## 2024-12-14 PROCEDURE — 250N000013 HC RX MED GY IP 250 OP 250 PS 637: Performed by: FAMILY MEDICINE

## 2024-12-14 PROCEDURE — 36415 COLL VENOUS BLD VENIPUNCTURE: CPT | Performed by: FAMILY MEDICINE

## 2024-12-14 PROCEDURE — 80048 BASIC METABOLIC PNL TOTAL CA: CPT | Performed by: FAMILY MEDICINE

## 2024-12-14 PROCEDURE — 250N000011 HC RX IP 250 OP 636

## 2024-12-14 PROCEDURE — 250N000013 HC RX MED GY IP 250 OP 250 PS 637: Performed by: PHYSICIAN ASSISTANT

## 2024-12-14 PROCEDURE — 97535 SELF CARE MNGMENT TRAINING: CPT | Mod: GO

## 2024-12-14 PROCEDURE — 120N000001 HC R&B MED SURG/OB

## 2024-12-14 PROCEDURE — 250N000013 HC RX MED GY IP 250 OP 250 PS 637

## 2024-12-14 PROCEDURE — 99232 SBSQ HOSP IP/OBS MODERATE 35: CPT | Performed by: INTERNAL MEDICINE

## 2024-12-14 PROCEDURE — 82374 ASSAY BLOOD CARBON DIOXIDE: CPT | Performed by: FAMILY MEDICINE

## 2024-12-14 RX ADMIN — POLYETHYLENE GLYCOL 3350 17 G: 17 POWDER, FOR SOLUTION ORAL at 08:57

## 2024-12-14 RX ADMIN — HYDROMORPHONE HYDROCHLORIDE 2 MG: 2 TABLET ORAL at 11:52

## 2024-12-14 RX ADMIN — PRAMIPEXOLE DIHYDROCHLORIDE 0.5 MG: 0.5 TABLET ORAL at 08:59

## 2024-12-14 RX ADMIN — THERA TABS 1 TABLET: TAB at 09:00

## 2024-12-14 RX ADMIN — ACETAMINOPHEN 975 MG: 325 TABLET ORAL at 06:22

## 2024-12-14 RX ADMIN — GABAPENTIN 600 MG: 300 CAPSULE ORAL at 20:38

## 2024-12-14 RX ADMIN — GABAPENTIN 600 MG: 300 CAPSULE ORAL at 08:59

## 2024-12-14 RX ADMIN — ACETAMINOPHEN 975 MG: 325 TABLET ORAL at 14:33

## 2024-12-14 RX ADMIN — LISINOPRIL 5 MG: 5 TABLET ORAL at 08:59

## 2024-12-14 RX ADMIN — SENNOSIDES AND DOCUSATE SODIUM 1 TABLET: 50; 8.6 TABLET ORAL at 20:38

## 2024-12-14 RX ADMIN — ALLOPURINOL 200 MG: 100 TABLET ORAL at 08:59

## 2024-12-14 RX ADMIN — PRAMIPEXOLE DIHYDROCHLORIDE 0.5 MG: 0.5 TABLET ORAL at 20:38

## 2024-12-14 RX ADMIN — LEVOTHYROXINE SODIUM 50 MCG: 0.05 TABLET ORAL at 09:00

## 2024-12-14 RX ADMIN — CEFAZOLIN 1 G: 1 INJECTION, POWDER, FOR SOLUTION INTRAMUSCULAR; INTRAVENOUS at 08:57

## 2024-12-14 RX ADMIN — LINACLOTIDE 290 MCG: 145 CAPSULE, GELATIN COATED ORAL at 08:57

## 2024-12-14 RX ADMIN — ATENOLOL 25 MG: 25 TABLET ORAL at 08:59

## 2024-12-14 RX ADMIN — HYDROMORPHONE HYDROCHLORIDE 2 MG: 2 TABLET ORAL at 01:18

## 2024-12-14 RX ADMIN — HYDROMORPHONE HYDROCHLORIDE 2 MG: 2 TABLET ORAL at 06:22

## 2024-12-14 RX ADMIN — HYDROMORPHONE HYDROCHLORIDE 4 MG: 4 TABLET ORAL at 20:38

## 2024-12-14 RX ADMIN — ACETAMINOPHEN 975 MG: 325 TABLET ORAL at 22:35

## 2024-12-14 RX ADMIN — CEFAZOLIN 1 G: 1 INJECTION, POWDER, FOR SOLUTION INTRAMUSCULAR; INTRAVENOUS at 01:03

## 2024-12-14 RX ADMIN — SENNOSIDES AND DOCUSATE SODIUM 1 TABLET: 50; 8.6 TABLET ORAL at 08:59

## 2024-12-14 ASSESSMENT — ACTIVITIES OF DAILY LIVING (ADL)
ADLS_ACUITY_SCORE: 46
ADLS_ACUITY_SCORE: 47
ADLS_ACUITY_SCORE: 49
ADLS_ACUITY_SCORE: 49
ADLS_ACUITY_SCORE: 47
ADLS_ACUITY_SCORE: 49
ADLS_ACUITY_SCORE: 49
ADLS_ACUITY_SCORE: 50
ADLS_ACUITY_SCORE: 46
ADLS_ACUITY_SCORE: 49
ADLS_ACUITY_SCORE: 47
ADLS_ACUITY_SCORE: 47
ADLS_ACUITY_SCORE: 46
ADLS_ACUITY_SCORE: 47
ADLS_ACUITY_SCORE: 47
ADLS_ACUITY_SCORE: 46
ADLS_ACUITY_SCORE: 49
ADLS_ACUITY_SCORE: 50
ADLS_ACUITY_SCORE: 47
ADLS_ACUITY_SCORE: 49
ADLS_ACUITY_SCORE: 47
ADLS_ACUITY_SCORE: 47
ADLS_ACUITY_SCORE: 46

## 2024-12-14 NOTE — PLAN OF CARE
Problem: Spinal Surgery  Goal: Optimal Coping with Surgery  Outcome: Progressing     Problem: Spinal Surgery  Goal: Optimal Pain Control and Function  Outcome: Progressing    Patient alert and oriented. VSS. RA. NS infusing at 75ml/hr. Patient is deaf, communicating with patient with help of family and pen and paper. ON Clear liquid diet, not passing gas, Has che in place, patent and draining. Collar in place. DIPTI drain with 60ml output this shift. IV dilaudid x2 given for pain. Call light within reach. Alarms on. Family at bedside.

## 2024-12-14 NOTE — PLAN OF CARE
Patient vital signs are at baseline: Yes  Patient able to ambulate as they were prior to admission or with assist devices provided by therapies during their stay:  No,  Reason:  Not up yet  Patient MUST void prior to discharge:  No,  Reason:  Trevizo catheter in place  Patient able to tolerate oral intake:  Yes  Pain has adequate pain control using Oral analgesics:  Yes  Does patient have an identified :  Yes  Has goal D/C date and time been discussed with patient:  Yes   Problem: Spinal Surgery  Goal: Absence of Bleeding  Outcome: Progressing  Goal: Absence of Infection Signs and Symptoms  Outcome: Progressing  Intervention: Prevent or Manage Infection  Recent Flowsheet Documentation  Taken 12/14/2024 0000 by Dav Reardon, RN  Infection Prevention:   hand hygiene promoted   rest/sleep promoted   single patient room provided  Goal: Optimal Pain Control and Function  Outcome: Progressing  Intervention: Prevent or Manage Pain  Recent Flowsheet Documentation  Taken 12/14/2024 0118 by Dav Reardon, RN  Pain Management Interventions: medication (see MAR)

## 2024-12-14 NOTE — PROGRESS NOTES
[facility]    Medicine Progress Note - Hospitalist Service    Date of Admission:  12/13/2024    Assessment & Plan   Torsten La is a 64 year old male with a PMH of hearing loss uses , IVELISSE not on CPAP, HTN, constipation, chronic kidney disease stage IIIa, prediabetes, RLS, GERD, hypothyroidism, depression, gout, chronic hep B and mild cognitive impairment.  Underwent C2 laminectomy with multiple level cervical fusion.  Complaints regarding pain control today.  DIPTI surgical drain in place. Seen with .        Status post C2 laminectomy with multiple level cervical fusion  Postoperative management per surgery.  PTA gabapentin 600 mg twice daily.     Obstructive sleep apnea  Patient has not worn his CPAP for the last 3 months due to problems with the device.  Waiting for a replacement.  Monitor pulse oximetry per standard protocols and provide supplemental oxygen/CPAP per standard protocols.     Chronic constipation  PTA Linzess.  continue MiraLAX, senna docusate.  PTA as needed Fleet enema.  Follow bowel movements closely.     Essential hypertension  PTA atenolol and lisinopril with hold parameters.     Chronic kidney disease stage IIIa  Preoperative creatinine 1.32 and potassium 4.3.    Creatinine improved to 1.0.  Potassium normal at 4.2.   No further monitoring required.      Restless leg syndrome  Depression  PTA Mirapex.     Hypothyroidism  PTA levothyroxine.     Gout  PTA allopurinol.     Prediabetes  Glucose at postoperative goal of < 180 mg/dL.   No further checks indicated.     History of chronic hepatitis B  Noted.           Diet: Advance Diet as Tolerated: Regular Diet Adult    DVT Prophylaxis: Defer to primary service  Trevizo Catheter: PRESENT, indication: Surgical procedure  Lines: None     Cardiac Monitoring: None  Code Status: Full Code      Clinically Significant Risk Factors                   # Hypertension: Noted on problem list            # Obesity:  Estimated body mass index is 34.18 kg/m  as calculated from the following:    Height as of this encounter: 1.524 m (5').    Weight as of this encounter: 79.4 kg (175 lb)., PRESENT ON ADMISSION            Social Drivers of Health    Tobacco Use: Unknown (12/13/2024)    Patient History     Smoking Tobacco Use: Never     Smokeless Tobacco Use: Unknown   Interpersonal Safety: Unknown (12/13/2024)    Interpersonal Safety     Do you feel physically and emotionally safe where you currently live?: Patient unable to answer     Within the past 12 months, have you been hit, slapped, kicked or otherwise physically hurt by someone?: Patient unable to answer     Within the past 12 months, have you been humiliated or emotionally abused in other ways by your partner or ex-partner?: Patient unable to answer          Disposition Plan     Medically Ready for Discharge: Ready Now             Martin Osuna DO  Hospitalist Service  [facility]  Securely message with [facility] (more info)  Text page via [employer] Paging/Directory   ______________________________________________________________________    Interval History   Pleasant 63 yo with complaint of postoperative pain.      Physical Exam   Vital Signs: Temp: 97.5  F (36.4  C) Temp src: Oral BP: 131/88 Pulse: 89   Resp: 17 SpO2: 93 % O2 Device: None (Room air) Oxygen Delivery: 2 LPM  Weight: 175 lbs 0 oz    General Appearance: NAD  Respiratory: CTAB, no wheezing.  Cardiovascular: RRR no murmur.  GI: normoactive bowel sounds, nontender to palpation.  Skin: Warm and dry, no exanthem on exposed skin.  Other: AOX3. DIPTI drain draining serosanguinous fluid.     Medical Decision Making       40 MINUTES SPENT BY ME on the date of service doing chart review, history, exam, documentation & further activities per the note.      Data     I have personally reviewed the following data over the past 24 hrs:    N/A  \   N/A   / N/A     141 107 17.0 /  115 (H); 115 (H)   4.2 21  (L) 1.01 \       Imaging results reviewed over the past 24 hrs:   Recent Results (from the past 24 hours)   XR Surgery OARM    Narrative    This exam was marked as non-reportable because it will not be read by a   radiologist or a [facility] non-radiologist provider.

## 2024-12-14 NOTE — PROGRESS NOTES
[facility]    Neurosurgery Progress Note    Date of Service (when I saw the patient): 12/14/2024     Assessment & Plan     Procedure(s):  cervical 2 laminectomy and  right cervical 6-cervical 7 and bilateral cervical 7-thoracic 1 foraminotomies with cervical 6-thoracic 1 posterior instrumented fusion and arthrodesis with use of stealth.   -1 Day Post-Op      Patient states that he is doing okay has complaint of incisional pain that extends into shoulders bilaterally denies any radicular lower extremity pain numbness tingling or weakness. Notes relief with current pain medications. Voiding without difficulty PVRs to be completed        Plan:  Keep drain in place  Collar to be worn when out of bed   Xray to be completed  Activity as tolerated with PT/OT  Orthotics to refit patient collar   SCDs, Lovenox to begin 48hrs postop   Bladder protocol with PVRs to be completed  Bowel regimen   Tentative discharge 1-2 days pending activity pain control and        I have discussed the following assessment and plan Dr. Abel who is in agreement with initial plan and will follow up with further consultation recommendations.    Miriam Rodríguez PA-C  [facility]  Tel [phone]  Text page via [employer] Paging/Directory     Interval History   stable    Physical Exam   Temp: 97.5  F (36.4  C) Temp src: Oral BP: 131/88 Pulse: 96   Resp: 16 SpO2: 98 % O2 Device: None (Room air)    Vitals:    12/13/24 0635   Weight: 79.4 kg (175 lb)     Vital Signs with Ranges  Temp:  [97.5  F (36.4  C)-98.2  F (36.8  C)] 97.5  F (36.4  C)  Pulse:  [] 96  Resp:  [10-20] 16  BP: (108-164)/(66-90) 131/88  SpO2:  [91 %-98 %] 98 %  I/O last 3 completed shifts:  In: 2400 [I.V.:2400]  Out: 2220 [Urine:2050; Drains:170]     , Blood pressure 131/88, pulse 96, temperature 97.5  F (36.4  C), temperature source Oral, resp. rate 16, height 1.524 m (5'), weight 79.4 kg (175 lb), SpO2 98%.  175 lbs 0 oz  HEENT:   Normocephalic.  PERRLA.  EOM s intact.    Neck:  Supple, non-tender, without lymphadenopathy.  Heart:  No peripheral edema  Lungs:  No SOB  Abdomen:  Soft, non-tender, non-distended.   Skin:  Warm and dry, good capillary refill.  Extremities:  Good radial and dorsalis pedis pulses bilaterally, no edema, cyanosis or clubbing.    NEUROLOGICAL EXAMINATION:   Mental status: alert and oriented x3 speech clear and appropriate   Cranial nerves: II-XII intact  Motor strength:   Biceps: 5/5 right, 5/5 left   Wrist extensors:5/5 right, 5/5 left   Triceps: 5/5 right, 5/5 left   Deltoids: 5/5 right, 5/5 left   Finger flexion: 5/5 right, 5/5 left   Finger abduction:5/5 right, 5/5 left   Hand : 5/5 right, 5/5 left   Hip flexors: 5/5 right, 5/5 left   Quadriceps: 5/5 right, 5/5 left  Ankle dorsiflexion: 5/5 right, 5/5 left    Ankle plantar flexion:5/5 right, 5/5 left   Extensor hallicus longus: 5/5 right, 5/5 left   Sensation:  intact  Reflexes:  Negative Babinski.  Negative Clonus.    Coordination:  Smooth finger to nose testing.   Negative pronator drift.    Gait:  not tested    DIPTI drain 50mL/8hrs     Medications   Current Facility-Administered Medications   Medication Dose Route Frequency Provider Last Rate Last Admin    sodium chloride 0.9 % infusion   Intravenous Continuous Ora Rayo APRN CNP 75 mL/hr at 12/13/24 1343 Rate Verify at 12/13/24 1343     Current Facility-Administered Medications   Medication Dose Route Frequency Provider Last Rate Last Admin    acetaminophen (TYLENOL) tablet 975 mg  975 mg Oral Q8H Ora Rayo APRN CNP   975 mg at 12/14/24 0622    allopurinol (ZYLOPRIM) tablet 200 mg  200 mg Oral Daily Benji Spaulding MD   200 mg at 12/14/24 0859    atenolol (TENORMIN) tablet 25 mg  25 mg Oral Daily Benji Spaulding MD   25 mg at 12/14/24 0859    gabapentin (NEURONTIN) capsule 600 mg  600 mg Oral BID Benji Spaulding MD   600 mg at 12/14/24 0859    levothyroxine (SYNTHROID/LEVOTHROID) tablet  "50 mcg  50 mcg Oral Daily Benji Spaulding MD   50 mcg at 12/14/24 0900    linaclotide (LINZESS) capsule 290 mcg  290 mcg Oral QAM Benji Spaulding MD   290 mcg at 12/14/24 0857    lisinopril (ZESTRIL) tablet 5 mg  5 mg Oral Daily Benji Spaulding MD   5 mg at 12/14/24 0859    multivitamin, therapeutic (THERA-VIT) tablet 1 tablet  1 tablet Oral Daily Ora Rayo APRN CNP   1 tablet at 12/14/24 0900    polyethylene glycol (MIRALAX) Packet 17 g  17 g Oral Daily Ora Rayo APRN CNP   17 g at 12/14/24 0857    pramipexole (MIRAPEX) tablet 0.5 mg  0.5 mg Oral BID Benji Spaulding MD   0.5 mg at 12/14/24 0859    senna-docusate (SENOKOT-S/PERICOLACE) 8.6-50 MG per tablet 1 tablet  1 tablet Oral BID Ora Rayo APRN CNP   1 tablet at 12/14/24 0859       Data     CBC RESULTS:   Recent Labs   Lab Test 07/22/22  2328   WBC 6.2   RBC 4.92   HGB 14.9   HCT 44.2   MCV 90   MCH 30.3   MCHC 33.7   RDW 14.1        Basic Metabolic Panel:  Lab Results   Component Value Date     12/14/2024      Lab Results   Component Value Date    POTASSIUM 4.2 12/14/2024    POTASSIUM 4.8 07/22/2022     Lab Results   Component Value Date    CHLORIDE 107 12/14/2024    CHLORIDE 108 07/22/2022     Lab Results   Component Value Date    JOHAN 9.0 12/14/2024     Lab Results   Component Value Date    CO2 21 12/14/2024    CO2 28 07/22/2022     Lab Results   Component Value Date    BUN 17.0 12/14/2024    BUN 16 07/22/2022     Lab Results   Component Value Date    CR 1.01 12/14/2024     Lab Results   Component Value Date     12/14/2024     12/14/2024    GLC 85 12/13/2024    GLC 95 07/22/2022     INR:  No results found for: \"INR\"     "

## 2024-12-14 NOTE — PROGRESS NOTES
12/14/24 0925   Appointment Info   Signing Clinician's Name / Credentials (OT) Lucien Lopez OTR/L       Present yes   Language ASL in-person   Living Environment   People in Home spouse;child(dillon), adult   Current Living Arrangements house   Home Accessibility no concerns   Transportation Anticipated family or friend will provide   Living Environment Comments Pt has FWW, roll-in shower w/ grab bars and shower chair, standard toilets w/ grab bars   Self-Care   Usual Activity Tolerance moderate   Current Activity Tolerance moderate   Equipment Currently Used at Home grab bar, toilet;grab bar, tub/shower;shower chair   Fall history within last six months no   Activity/Exercise/Self-Care Comment Pt gets assistance from spouse w/ most ADLs and IADLs at baseline but pt does amb household distances and gets himself to the BR on his own as well as in/out of bed   General Information   Onset of Illness/Injury or Date of Surgery 12/13/24   Referring Physician Geneva Abel MD   Patient/Family Therapy Goal Statement (OT) go home   Additional Occupational Profile Info/Pertinent History of Current Problem C2 lami and C6-T1 fusion/foraminotomies   Existing Precautions/Restrictions brace worn when out of bed;spinal  ([place] J brace)   Limitations/Impairments hearing  (pt is deaf)   Cognitive Status Examination   Orientation Status orientation to person, place and time   Affect/Mental Status (Cognitive) WNL   Visual Perception   Visual Impairment/Limitations WFL   Sensory   Sensory Quick Adds sensation intact   Pain Assessment   Patient Currently in Pain Yes, see Vital Sign flowsheet   Posture   Posture not impaired   Range of Motion Comprehensive   General Range of Motion no range of motion deficits identified   Strength Comprehensive (MMT)   General Manual Muscle Testing (MMT) Assessment no strength deficits identified   Bed Mobility   Bed Mobility scooting/bridging;supine-sit;sit-supine   Comment  (Bed Mobility) Mod A   Transfers   Transfers bed-chair transfer;sit-stand transfer;toilet transfer   Transfer Comments CGA-Min A   Activities of Daily Living   BADL Assessment/Intervention bathing;lower body dressing;toileting   Bathing Assessment/Intervention   [place] Level (Bathing) moderate assist (50% patient effort)   Lower Body Dressing Assessment/Training   [place] Level (Lower Body Dressing) maximum assist (25% patient effort)   Toileting   [place] Level (Toileting) adjust/manage clothing;toileting skills;supervision   Clinical Impression   Criteria for Skilled Therapeutic Interventions Met (OT) Yes, treatment indicated   OT Diagnosis decreased ADLs   Influenced by the following impairments cervical lami/fusion   OT Problem List-Impairments impacting ADL activity tolerance impaired;mobility;pain;post-surgical precautions   Assessment of Occupational Performance 3-5 Performance Deficits   Identified Performance Deficits dressing, bed mobility, all transfers, bathing   Planned Therapy Interventions (OT) ADL retraining;bed mobility training;transfer training   Clinical Decision Making Complexity (OT) detailed assessment/moderate complexity   Risk & Benefits of therapy have been explained evaluation/treatment results reviewed;patient   OT Total Evaluation Time   OT Eval, Moderate Complexity Minutes (35473) 10   OT Goals   Therapy Frequency (OT) One time eval and treatment   OT Predicted Duration/Target Date for Goal Attainment 12/14/24   OT Goals Lower Body Dressing;Bed Mobility;Toilet Transfer/Toileting   OT: Lower Body Dressing Moderate assist;within precautions;Goal Met   OT: Bed Mobility Minimal assist;supine to/from sitting;within precautions;Goal Met   OT: Toilet Transfer/Toileting Modified independent;toilet transfer;cleaning and garment management;within precautions;Goal Met   Interventions   Interventions Quick Adds Self-Care/Home Management   Self-Care/Home Management   Self-Care/Home  Mgmt/ADL, Compensatory, Meal Prep Minutes (27236) 33   Symptoms Noted During/After Treatment (Meal Preparation/Planning Training) increased pain   Treatment Detail/Skilled Intervention  present for session as well as pt's daughter. Pt edu on spinal px and wearing [place] J brace when OOB - pt verbalized understanding. Pt edu on log roll technique for bed mobility - completed supine>sitting w/ Min A. Pt has good sitting balance EOB. Pt instructed on compensatory strategies for LE dressing - pt stated he will get assistance from spouse at home - socks donned w /Max A. Pt STS w/ SBA and FWW - minor dizziness when standing and cues for PLB which helped symptoms reside quickly. Pt amb. 20 ft to BR w/ FWW and CGA, minor unsteadiness but no LOB. Dtr states that is how pt typically mobilitzes at home. Pt edu on safe toilet transfer - completed  w/ SBA and toileted Mod I. Pt amb. ~75 ft x2 in hallway w/ FWW and SBA w/ 1 seated rest break. Pt moving better once he gets going and pain is controlled. Edu on compensatory strategies for sleeping position/car transfers - pt verbalized understanding. Edu handout given on ADLs w/in spinal px. Pt ended session in recliner.   OT Discharge Planning   OT Plan DC OT   OT Discharge Recommendation (DC Rec) (S)  home with assist   OT Rationale for DC Rec Pt doing well post op and has accessible one level home w/ all necessary DME. Family is home to assist as needed - 24 hr care.   OT Brief overview of current status Mod A LE dressing, Min A bed mobility, Mod I toileting/mobility/transfers   Total Session Time   Timed Code Treatment Minutes 33   Total Session Time (sum of timed and untimed services) 43

## 2024-12-14 NOTE — PLAN OF CARE
Patient vital signs are at baseline: Yes  Patient able to ambulate as they were prior to admission or with assist devices provided by therapies during their stay:  Yes  Patient MUST void prior to discharge:  Yes Due for one more PVR   Patient able to tolerate oral intake:  Yes  Pain has adequate pain control using Oral analgesics:  Yes  Does patient have an identified :  Yes  Has goal D/C date and time been discussed with patient:  Yes Patient should discharge to home with family support once drain can be removed.

## 2024-12-14 NOTE — PLAN OF CARE
Physical Therapy: Orders received. Chart reviewed and discussed with care team.? Physical Therapy not indicated due to independence with mobility.? Will complete orders.

## 2024-12-15 ENCOUNTER — APPOINTMENT (OUTPATIENT)
Dept: RADIOLOGY | Facility: CLINIC | Age: 64
DRG: 472 | End: 2024-12-15
Payer: COMMERCIAL

## 2024-12-15 PROCEDURE — 250N000013 HC RX MED GY IP 250 OP 250 PS 637

## 2024-12-15 PROCEDURE — 120N000001 HC R&B MED SURG/OB

## 2024-12-15 PROCEDURE — 99232 SBSQ HOSP IP/OBS MODERATE 35: CPT | Performed by: INTERNAL MEDICINE

## 2024-12-15 PROCEDURE — 999N000065 XR CERVICAL SPINE 2/3 VIEWS

## 2024-12-15 PROCEDURE — 250N000013 HC RX MED GY IP 250 OP 250 PS 637: Performed by: FAMILY MEDICINE

## 2024-12-15 PROCEDURE — 250N000013 HC RX MED GY IP 250 OP 250 PS 637: Performed by: PHYSICIAN ASSISTANT

## 2024-12-15 RX ORDER — METHOCARBAMOL 750 MG/1
750 TABLET, FILM COATED ORAL EVERY 6 HOURS
Status: DISCONTINUED | OUTPATIENT
Start: 2024-12-15 | End: 2024-12-16 | Stop reason: HOSPADM

## 2024-12-15 RX ADMIN — PRAMIPEXOLE DIHYDROCHLORIDE 0.5 MG: 0.5 TABLET ORAL at 21:34

## 2024-12-15 RX ADMIN — ALLOPURINOL 200 MG: 100 TABLET ORAL at 09:31

## 2024-12-15 RX ADMIN — METHOCARBAMOL TABLETS 750 MG: 750 TABLET, COATED ORAL at 14:36

## 2024-12-15 RX ADMIN — SENNOSIDES AND DOCUSATE SODIUM 1 TABLET: 50; 8.6 TABLET ORAL at 21:34

## 2024-12-15 RX ADMIN — HYDROMORPHONE HYDROCHLORIDE 4 MG: 4 TABLET ORAL at 04:12

## 2024-12-15 RX ADMIN — ACETAMINOPHEN 975 MG: 325 TABLET ORAL at 14:36

## 2024-12-15 RX ADMIN — ATENOLOL 25 MG: 25 TABLET ORAL at 09:31

## 2024-12-15 RX ADMIN — GABAPENTIN 600 MG: 300 CAPSULE ORAL at 09:32

## 2024-12-15 RX ADMIN — SENNOSIDES AND DOCUSATE SODIUM 1 TABLET: 50; 8.6 TABLET ORAL at 09:31

## 2024-12-15 RX ADMIN — LISINOPRIL 5 MG: 5 TABLET ORAL at 09:31

## 2024-12-15 RX ADMIN — GABAPENTIN 600 MG: 300 CAPSULE ORAL at 21:34

## 2024-12-15 RX ADMIN — PRAMIPEXOLE DIHYDROCHLORIDE 0.5 MG: 0.5 TABLET ORAL at 09:31

## 2024-12-15 RX ADMIN — ACETAMINOPHEN 975 MG: 325 TABLET ORAL at 21:34

## 2024-12-15 RX ADMIN — THERA TABS 1 TABLET: TAB at 09:31

## 2024-12-15 RX ADMIN — LINACLOTIDE 290 MCG: 145 CAPSULE, GELATIN COATED ORAL at 06:30

## 2024-12-15 RX ADMIN — LEVOTHYROXINE SODIUM 50 MCG: 0.05 TABLET ORAL at 09:33

## 2024-12-15 RX ADMIN — POLYETHYLENE GLYCOL 3350 17 G: 17 POWDER, FOR SOLUTION ORAL at 09:31

## 2024-12-15 RX ADMIN — METHOCARBAMOL TABLETS 750 MG: 750 TABLET, COATED ORAL at 21:33

## 2024-12-15 RX ADMIN — ACETAMINOPHEN 975 MG: 325 TABLET ORAL at 06:29

## 2024-12-15 RX ADMIN — HYDROMORPHONE HYDROCHLORIDE 2 MG: 2 TABLET ORAL at 19:48

## 2024-12-15 RX ADMIN — METHOCARBAMOL TABLETS 750 MG: 750 TABLET, COATED ORAL at 03:11

## 2024-12-15 RX ADMIN — HYDROMORPHONE HYDROCHLORIDE 4 MG: 4 TABLET ORAL at 15:02

## 2024-12-15 RX ADMIN — METHOCARBAMOL TABLETS 750 MG: 750 TABLET, COATED ORAL at 09:31

## 2024-12-15 ASSESSMENT — ACTIVITIES OF DAILY LIVING (ADL)
ADLS_ACUITY_SCORE: 41
ADLS_ACUITY_SCORE: 41
ADLS_ACUITY_SCORE: 61
ADLS_ACUITY_SCORE: 50
ADLS_ACUITY_SCORE: 41
ADLS_ACUITY_SCORE: 61
ADLS_ACUITY_SCORE: 41
ADLS_ACUITY_SCORE: 61
ADLS_ACUITY_SCORE: 41
ADLS_ACUITY_SCORE: 50
ADLS_ACUITY_SCORE: 41
ADLS_ACUITY_SCORE: 41
ADLS_ACUITY_SCORE: 50

## 2024-12-15 NOTE — PROGRESS NOTES
[facility]    Medicine Progress Note - Hospitalist Service    Date of Admission:  12/13/2024    Assessment & Plan   Torsten La is a 64 year old male with a PMH of hearing loss uses , IVELISSE not on CPAP, HTN, constipation, chronic kidney disease stage IIIa, prediabetes, RLS, GERD, hypothyroidism, depression, gout, chronic hep B and mild cognitive impairment.  Underwent C2 laminectomy with multiple level cervical fusion.  Complaints regarding pain control today.  DIPTI surgical drain in place. Seen with .      Status post C2 laminectomy with multiple level cervical fusion  - wound and drain management per neurosurgery.  - PTA gabapentin 600 mg twice daily.  - continue pain control per neurosurgery.     Obstructive sleep apnea  - Monitor pulse oximetry per standard protocols and provide supplemental oxygen/CPAP per standard protocols.     Chronic constipation  - PTA Linzess.  - PTA as needed Fleet enema.  - continue senna docusate.  - Continue to follow      Essential hypertension  PTA atenolol and lisinopril with hold parameters.     Chronic kidney disease stage IIIa  Preoperative creatinine 1.32 and potassium 4.3.    Creatinine improved to 1.0.  Potassium normal at 4.2.   No further monitoring required.      Restless leg syndrome  Depression  PTA Mirapex.     Hypothyroidism  PTA levothyroxine.     Gout  PTA allopurinol.     Prediabetes  Glucose at postoperative goal of < 180 mg/dL.   No further checks indicated.     History of chronic hepatitis B  Noted.           Diet: Advance Diet as Tolerated: Regular Diet Adult    DVT Prophylaxis: Defer to primary service  Trevizo Catheter: Not present  Lines: None     Cardiac Monitoring: None  Code Status: Full Code      Clinically Significant Risk Factors                   # Hypertension: Noted on problem list            # Obesity: Estimated body mass index is 34.18 kg/m  as calculated from the following:    Height as of this  encounter: 1.524 m (5').    Weight as of this encounter: 79.4 kg (175 lb)., PRESENT ON ADMISSION            Social Drivers of Health    Tobacco Use: Unknown (12/13/2024)    Patient History     Smoking Tobacco Use: Never     Smokeless Tobacco Use: Unknown   Interpersonal Safety: Unknown (12/13/2024)    Interpersonal Safety     Do you feel physically and emotionally safe where you currently live?: Patient unable to answer     Within the past 12 months, have you been hit, slapped, kicked or otherwise physically hurt by someone?: Patient unable to answer     Within the past 12 months, have you been humiliated or emotionally abused in other ways by your partner or ex-partner?: Patient unable to answer          Disposition Plan     Medically Ready for Discharge: Anticipated Tomorrow     Martin Osuna DO  Hospitalist Service  [facility]  Securely message with [employer] (more info)  Text page via [employer] Paging/Directory   ______________________________________________________________________    Interval History   Complains over posterior neck pain.      Physical Exam   Vital Signs: Temp: 99  F (37.2  C) Temp src: Oral BP: (!) 160/99 (nurse notified) Pulse: 90   Resp: 20 SpO2: 94 % O2 Device: None (Room air)    Weight: 175 lbs 0 oz    General Appearance:  NAD  Respiratory: CTAB, no wheezing.  Cardiovascular: RRR no murmur.  GI: normoactive bowel sounds, nontender to palpation.  Skin: Warm and dry, no exanthem on exposed skin.  Other:  AOX3. DIPTI drain draining serosanguinous fluid.        Medical Decision Making       40 MINUTES SPENT BY ME on the date of service doing chart review, history, exam, documentation & further activities per the note.      Data         Imaging results reviewed over the past 24 hrs:   No results found for this or any previous visit (from the past 24 hours).

## 2024-12-15 NOTE — PROGRESS NOTES
[facility]    Neurosurgery Progress Note    Date of Service (when I saw the patient): 12/15/2024     Assessment & Plan     Procedure(s):  cervical 2 laminectomy and  right cervical 6-cervical 7 and bilateral cervical 7-thoracic 1 foraminotomies with cervical 6-thoracic 1 posterior instrumented fusion and arthrodesis with use of stealth.   -2 Days Post-Op      Patient states that he is doing okay has slight increased incisional pain denies any radicular upper extremity pain numbness tingling or weakness. Voiding without difficulty and passing gas        Plan:  Keep drain in place  Collar to be worn when out of bed   Continue current pain medications will adjust robaxin to scheduled   Xray to be completed  Activity as tolerated with PT/OT  SCDs, Lovenox to begin today 48hrs postop    Bowel regimen   Tentative discharge tomorrow pending drain and pain control        I have discussed the following assessment and plan Dr. Abel who is in agreement with initial plan and will follow up with further consultation recommendations.    Miriam Rodríguez PA-C  [facility] Neurosurgery  Tel [phone]  Text page via [employer] Paging/Directory     Interval History   stable    Physical Exam   Temp: 98.6  F (37  C) Temp src: Oral BP: (!) 160/99 (nurse notified) Pulse: 96   Resp: 16 SpO2: 93 % O2 Device: None (Room air)    Vitals:    12/13/24 0635   Weight: 79.4 kg (175 lb)     Vital Signs with Ranges  Temp:  [97.5  F (36.4  C)-99  F (37.2  C)] 98.6  F (37  C)  Pulse:  [83-96] 96  Resp:  [16-20] 16  BP: (131-172)/() 160/99  SpO2:  [93 %-98 %] 93 %  I/O last 3 completed shifts:  In: -   Out: 1370 [Urine:1250; Drains:120]     , Blood pressure (!) 160/99, pulse 96, temperature 98.6  F (37  C), temperature source Oral, resp. rate 16, height 1.524 m (5'), weight 79.4 kg (175 lb), SpO2 93%.  175 lbs 0 oz  HEENT:  Normocephalic.  PERRLA.  EOM s intact.    Neck:  Supple, non-tender, without  lymphadenopathy.  Heart:  No peripheral edema  Lungs:  No SOB  Abdomen:  Soft, non-tender, non-distended.   Skin:  Warm and dry, good capillary refill.  Extremities:  Good radial and dorsalis pedis pulses bilaterally, no edema, cyanosis or clubbing.    NEUROLOGICAL EXAMINATION:   Mental status: alert and oriented x3 speech clear and appropriate   Cranial nerves: II-XII intact  Motor strength:   Biceps: 5/5 right, 5/5 left   Wrist extensors:5/5 right, 5/5 left   Triceps: 5/5 right, 5/5 left   Deltoids: 5/5 right, 5/5 left   Finger flexion: 5/5 right, 5/5 left   Finger abduction:5/5 right, 5/5 left   Hand : 5/5 right, 5/5 left   Hip flexors: 5/5 right, 5/5 left   Quadriceps: 5/5 right, 5/5 left  Ankle dorsiflexion: 5/5 right, 5/5 left    Ankle plantar flexion:5/5 right, 5/5 left   Extensor hallicus longus: 5/5 right, 5/5 left   Sensation:  intact  Reflexes:  Negative Babinski.  Negative Clonus.    Coordination:  Smooth finger to nose testing.   Negative pronator drift.    Gait:  not tested    DIPTI drain 45mL/8hrs     Medications   Current Facility-Administered Medications   Medication Dose Route Frequency Provider Last Rate Last Admin     Current Facility-Administered Medications   Medication Dose Route Frequency Provider Last Rate Last Admin    acetaminophen (TYLENOL) tablet 975 mg  975 mg Oral Q8H Ora Rayo APRN CNP   975 mg at 12/15/24 0629    allopurinol (ZYLOPRIM) tablet 200 mg  200 mg Oral Daily Benji Spaulding MD   200 mg at 12/14/24 0859    atenolol (TENORMIN) tablet 25 mg  25 mg Oral Daily Benji Spaulding MD   25 mg at 12/14/24 0859    gabapentin (NEURONTIN) capsule 600 mg  600 mg Oral BID Benji Spaulding MD   600 mg at 12/14/24 2038    levothyroxine (SYNTHROID/LEVOTHROID) tablet 50 mcg  50 mcg Oral Daily Benji Spaulding MD   50 mcg at 12/14/24 0900    linaclotide (LINZESS) capsule 290 mcg  290 mcg Oral QAM Benji Spaulding MD   290 mcg at 12/15/24 0630    lisinopril (ZESTRIL) tablet 5 mg   "5 mg Oral Daily Benji Spaulding MD   5 mg at 12/14/24 0859    methocarbamol (ROBAXIN) tablet 750 mg  750 mg Oral Q6H Miriam Rodríguez PAЕкатеринаC        multivitamin, therapeutic (THERA-VIT) tablet 1 tablet  1 tablet Oral Daily Ora Rayo APRN CNP   1 tablet at 12/14/24 0900    polyethylene glycol (MIRALAX) Packet 17 g  17 g Oral Daily Ora Rayo APRN CNP   17 g at 12/14/24 0857    pramipexole (MIRAPEX) tablet 0.5 mg  0.5 mg Oral BID Benji Spaulding MD   0.5 mg at 12/14/24 2038    senna-docusate (SENOKOT-S/PERICOLACE) 8.6-50 MG per tablet 1 tablet  1 tablet Oral BID Ora Rayo APRN CNP   1 tablet at 12/14/24 2038       Data     CBC RESULTS:   Recent Labs   Lab Test 07/22/22  2328   WBC 6.2   RBC 4.92   HGB 14.9   HCT 44.2   MCV 90   MCH 30.3   MCHC 33.7   RDW 14.1        Basic Metabolic Panel:  Lab Results   Component Value Date     12/14/2024      Lab Results   Component Value Date    POTASSIUM 4.2 12/14/2024    POTASSIUM 4.8 07/22/2022     Lab Results   Component Value Date    CHLORIDE 107 12/14/2024    CHLORIDE 108 07/22/2022     Lab Results   Component Value Date    JOHAN 9.0 12/14/2024     Lab Results   Component Value Date    CO2 21 12/14/2024    CO2 28 07/22/2022     Lab Results   Component Value Date    BUN 17.0 12/14/2024    BUN 16 07/22/2022     Lab Results   Component Value Date    CR 1.01 12/14/2024     Lab Results   Component Value Date     12/14/2024     12/14/2024    GLC 85 12/13/2024    GLC 95 07/22/2022     INR:  No results found for: \"INR\"     "

## 2024-12-15 NOTE — PLAN OF CARE
Patient vital signs are at baseline: Yes  Patient able to ambulate as they were prior to admission or with assist devices provided by therapies during their stay:  Yes  Patient MUST void prior to discharge:  Yes  Patient able to tolerate oral intake:  Yes  Pain has adequate pain control using Oral analgesics:  Yes  Does patient have an identified :  Yes  Has goal D/C date and time been discussed with patient:  No,  Reason:  DIPTI drain still in place.  Problem: Adult Inpatient Plan of Care  Goal: Absence of Hospital-Acquired Illness or Injury  Intervention: Identify and Manage Fall Risk  Recent Flowsheet Documentation  Taken 12/15/2024 0944 by Bee Meyer RN  Safety Promotion/Fall Prevention: safety round/check completed  Intervention: Prevent Infection  Recent Flowsheet Documentation  Taken 12/15/2024 0944 by Bee Meyer RN  Infection Prevention:   hand hygiene promoted   rest/sleep promoted   single patient room provided  Goal: Optimal Comfort and Wellbeing  Intervention: Monitor Pain and Promote Comfort  Recent Flowsheet Documentation  Taken 12/15/2024 0932 by Bee Meyer RN  Pain Management Interventions: medication (see MAR)     Problem: Skin Injury Risk Increased  Goal: Skin Health and Integrity  Outcome: Progressing  Intervention: Plan: Nurse Driven Intervention: Moisture Management  Recent Flowsheet Documentation  Taken 12/15/2024 0944 by Bee Meyer RN  Moisture Interventions:   Encourage regular toileting   No brief in bed   Incontinence pad  Intervention: Plan: Nurse Driven Intervention: Friction and Shear  Recent Flowsheet Documentation  Taken 12/15/2024 0944 by Bee Meyer RN  Friction/Shear Interventions: HOB 30 degrees or less     Problem: Spinal Surgery  Goal: Optimal Coping with Surgery  Outcome: Progressing  Goal: Optimal Functional Ability  Outcome: Progressing  Goal: Absence of Infection Signs and Symptoms  Intervention: Prevent or Manage  Infection  Recent Flowsheet Documentation  Taken 12/15/2024 0944 by Bee Meyer RN  Infection Prevention:   hand hygiene promoted   rest/sleep promoted   single patient room provided  Goal: Optimal Neurologic Function  Intervention: Optimize Neurologic Function  Recent Flowsheet Documentation  Taken 12/15/2024 0944 by Bee Meyer RN  Range of Motion: active ROM (range of motion) encouraged  Goal: Anesthesia/Sedation Recovery  Intervention: Optimize Anesthesia Recovery  Recent Flowsheet Documentation  Taken 12/15/2024 0944 by Bee Meyer RN  Safety Promotion/Fall Prevention: safety round/check completed  Goal: Optimal Pain Control and Function  Outcome: Progressing  Intervention: Prevent or Manage Pain  Recent Flowsheet Documentation  Taken 12/15/2024 0932 by Bee Meyer RN  Pain Management Interventions: medication (see MAR)   Goal Outcome Evaluation:  Patient is awake and alert and oriented to time, place and person.    B/P: 160/99 [nurse notified], T: 98.6, P: 96, R: 16.    RA none to maintain saturations > 92%.    C/O 5/10 pain, controlled with scheduled Robaxcin     St. by assist, Assist of 1, Gait belt, and Walker.    Orders Placed This Encounter      Advance Diet as Tolerated: Regular Diet Adult    Saline Lock.    Alarms on. Call light within reach.    Discharge plan home with support, pending drain removal

## 2024-12-15 NOTE — PLAN OF CARE
Goal Outcome Evaluation:       Patient vital signs are at baseline: Yes  Patient able to ambulate as they were prior to admission or with assist devices provided by therapies during their stay:  Yes  Patient MUST void prior to discharge:  Yes  Patient able to tolerate oral intake:  Yes  Pain has adequate pain control using Oral analgesics:  Yes  Does patient have an identified :  Yes  Has goal D/C date and time been discussed with patient:  No,  Reason:  Drain still in place.        Pt is doing well and ambulating. Some HTN overnight. Drain output at 0600 was 45ml. Pain is controlled with oral dilaudid.            Problem: Skin Injury Risk Increased  Goal: Skin Health and Integrity  Outcome: Progressing  Intervention: Plan: Nurse Driven Intervention: Moisture Management  Recent Flowsheet Documentation  Taken 12/15/2024 0040 by Kelly Simmons RN  Moisture Interventions:   No brief in bed   Encourage regular toileting  Taken 12/14/2024 2045 by Kelly Simmons RN  Moisture Interventions:   No brief in bed   Encourage regular toileting  Taken 12/14/2024 2040 by Kelly Simmons RN  Moisture Interventions:   Encourage regular toileting   No brief in bed  Taken 12/14/2024 2000 by Kelly Simmons RN  Moisture Interventions:   Encourage regular toileting   No brief in bed  Bathing/Skin Care: other (see comments)  Intervention: Plan: Nurse Driven Intervention: Friction and Shear  Recent Flowsheet Documentation  Taken 12/15/2024 0040 by Kelly Simmons RN  Friction/Shear Interventions: HOB 30 degrees or less  Taken 12/14/2024 2045 by Kelly Simmons RN  Friction/Shear Interventions: HOB 30 degrees or less  Intervention: Optimize Skin Protection  Recent Flowsheet Documentation  Taken 12/15/2024 0040 by Kelly Simmons RN  Pressure Reduction Techniques: frequent weight shift encouraged  Skin Protection: adhesive use limited  Activity Management:   ambulated to bathroom   ambulated outside room   back to bed  Taken  12/14/2024 2045 by Kelly Simmons, RN  Pressure Reduction Techniques: frequent weight shift encouraged  Skin Protection: adhesive use limited  Activity Management:   ambulated to bathroom   ambulated outside room   back to bed     Problem: Spinal Surgery  Goal: Optimal Coping with Surgery  Outcome: Progressing  Intervention: Support Psychosocial Response to Surgery  Recent Flowsheet Documentation  Taken 12/15/2024 0040 by Kelly Simmons, RN  Family/Support System Care: caregiver stress acknowledged  Taken 12/14/2024 2045 by Kelly Simmons, RN  Family/Support System Care: caregiver stress acknowledged

## 2024-12-16 ENCOUNTER — MYC MEDICAL ADVICE (OUTPATIENT)
Dept: NEUROSURGERY | Facility: CLINIC | Age: 64
End: 2024-12-16

## 2024-12-16 VITALS
SYSTOLIC BLOOD PRESSURE: 143 MMHG | RESPIRATION RATE: 18 BRPM | OXYGEN SATURATION: 91 % | WEIGHT: 175 LBS | TEMPERATURE: 98.6 F | HEART RATE: 90 BPM | HEIGHT: 60 IN | DIASTOLIC BLOOD PRESSURE: 83 MMHG | BODY MASS INDEX: 34.36 KG/M2

## 2024-12-16 DIAGNOSIS — M47.12 CERVICAL SPONDYLOSIS WITH MYELOPATHY: Primary | ICD-10-CM

## 2024-12-16 PROCEDURE — 99207 PR NO BILLABLE SERVICE THIS VISIT: CPT | Performed by: HOSPITALIST

## 2024-12-16 PROCEDURE — 250N000013 HC RX MED GY IP 250 OP 250 PS 637

## 2024-12-16 PROCEDURE — 250N000013 HC RX MED GY IP 250 OP 250 PS 637: Performed by: PHYSICIAN ASSISTANT

## 2024-12-16 PROCEDURE — 250N000012 HC RX MED GY IP 250 OP 636 PS 637: Performed by: PHYSICIAN ASSISTANT

## 2024-12-16 PROCEDURE — 250N000013 HC RX MED GY IP 250 OP 250 PS 637: Performed by: FAMILY MEDICINE

## 2024-12-16 RX ORDER — METHOCARBAMOL 750 MG/1
750 TABLET, FILM COATED ORAL EVERY 6 HOURS
Qty: 42 TABLET | Refills: 0 | Status: SHIPPED | OUTPATIENT
Start: 2024-12-16

## 2024-12-16 RX ORDER — HYDROMORPHONE HYDROCHLORIDE 2 MG/1
2-4 TABLET ORAL EVERY 4 HOURS PRN
Qty: 42 TABLET | Refills: 0 | Status: SHIPPED | OUTPATIENT
Start: 2024-12-16 | End: 2024-12-27

## 2024-12-16 RX ORDER — DEXAMETHASONE 4 MG/1
4 TABLET ORAL EVERY 12 HOURS SCHEDULED
Status: DISCONTINUED | OUTPATIENT
Start: 2024-12-16 | End: 2024-12-16 | Stop reason: HOSPADM

## 2024-12-16 RX ORDER — KETOROLAC TROMETHAMINE 15 MG/ML
15 INJECTION, SOLUTION INTRAMUSCULAR; INTRAVENOUS EVERY 6 HOURS PRN
Status: DISCONTINUED | OUTPATIENT
Start: 2024-12-16 | End: 2024-12-16 | Stop reason: HOSPADM

## 2024-12-16 RX ORDER — HYDROMORPHONE HYDROCHLORIDE 2 MG/1
2 TABLET ORAL EVERY 4 HOURS PRN
Qty: 42 TABLET | Refills: 0 | Status: SHIPPED | OUTPATIENT
Start: 2024-12-16 | End: 2024-12-16

## 2024-12-16 RX ORDER — DEXAMETHASONE 4 MG/1
4 TABLET ORAL EVERY 12 HOURS
Qty: 4 TABLET | Refills: 0 | Status: SHIPPED | OUTPATIENT
Start: 2024-12-16 | End: 2024-12-27

## 2024-12-16 RX ADMIN — HYDROMORPHONE HYDROCHLORIDE 4 MG: 4 TABLET ORAL at 06:49

## 2024-12-16 RX ADMIN — THERA TABS 1 TABLET: TAB at 09:18

## 2024-12-16 RX ADMIN — GABAPENTIN 600 MG: 300 CAPSULE ORAL at 09:17

## 2024-12-16 RX ADMIN — HYDROMORPHONE HYDROCHLORIDE 4 MG: 4 TABLET ORAL at 00:57

## 2024-12-16 RX ADMIN — LEVOTHYROXINE SODIUM 50 MCG: 0.05 TABLET ORAL at 09:18

## 2024-12-16 RX ADMIN — ACETAMINOPHEN 975 MG: 325 TABLET ORAL at 06:33

## 2024-12-16 RX ADMIN — METHOCARBAMOL TABLETS 750 MG: 750 TABLET, COATED ORAL at 09:17

## 2024-12-16 RX ADMIN — SENNOSIDES AND DOCUSATE SODIUM 1 TABLET: 50; 8.6 TABLET ORAL at 09:17

## 2024-12-16 RX ADMIN — ALLOPURINOL 200 MG: 100 TABLET ORAL at 09:18

## 2024-12-16 RX ADMIN — LISINOPRIL 5 MG: 5 TABLET ORAL at 09:18

## 2024-12-16 RX ADMIN — DEXAMETHASONE 4 MG: 4 TABLET ORAL at 09:17

## 2024-12-16 RX ADMIN — LINACLOTIDE 290 MCG: 145 CAPSULE, GELATIN COATED ORAL at 06:33

## 2024-12-16 RX ADMIN — PRAMIPEXOLE DIHYDROCHLORIDE 0.5 MG: 0.5 TABLET ORAL at 09:17

## 2024-12-16 RX ADMIN — HYDROMORPHONE HYDROCHLORIDE 4 MG: 4 TABLET ORAL at 11:34

## 2024-12-16 RX ADMIN — METHOCARBAMOL TABLETS 750 MG: 750 TABLET, COATED ORAL at 15:12

## 2024-12-16 RX ADMIN — METHOCARBAMOL TABLETS 750 MG: 750 TABLET, COATED ORAL at 03:00

## 2024-12-16 RX ADMIN — ATENOLOL 25 MG: 25 TABLET ORAL at 09:18

## 2024-12-16 ASSESSMENT — ACTIVITIES OF DAILY LIVING (ADL)
ADLS_ACUITY_SCORE: 42
ADLS_ACUITY_SCORE: 41
ADLS_ACUITY_SCORE: 42
ADLS_ACUITY_SCORE: 42
ADLS_ACUITY_SCORE: 41
ADLS_ACUITY_SCORE: 42
ADLS_ACUITY_SCORE: 41
ADLS_ACUITY_SCORE: 42
ADLS_ACUITY_SCORE: 42

## 2024-12-16 NOTE — PROGRESS NOTES
[facility]    Neurosurgery Progress Note    Date of Service (when I saw the patient): 12/16/2024     Assessment & Plan     Procedure(s):  cervical 2 laminectomy and  right cervical 6-cervical 7 and bilateral cervical 7-thoracic 1 foraminotomies with cervical 6-thoracic 1 posterior instrumented fusion and arthrodesis with use of stealth.   -3 Days Post-Op      Patient states that he is doing okay had right radicular upper extremity numbness and tingling that began this morning states that it increases when his neck pain increases. States that his posterior cervical pain has also increased this morning      Plan:  Remove drain  Collar to be worn when out of bed   Continue current pain medications  Will add decadron 4mg BID x3 days   Toradol 15mg IV q6hrs x 3 doses   Activity as tolerated with PT/OT  SCDs while in bed lovenox daily    Bowel regimen       Addendum:   Called and checked in on patient per nurse states that he is doing better and notes better control with his pain with the addition of decadron and would like to discharge home today   Okay to discharge   Decadron to continue until Wednesday morning   Follow up as scheduled          I have discussed the following assessment and plan Dr. Abel who is in agreement with initial plan and will follow up with further consultation recommendations.    Miriam Rodríguez PA-C  [facility]  Tel [phone]  Text page via [employer] Paging/Directory     Interval History   stable    Physical Exam   Temp: 98.6  F (37  C) Temp src: Oral BP: (!) 143/83 Pulse: 90   Resp: 18 SpO2: 91 % O2 Device: None (Room air)    Vitals:    12/13/24 0635   Weight: 79.4 kg (175 lb)     Vital Signs with Ranges  Temp:  [97.7  F (36.5  C)-98.6  F (37  C)] 98.6  F (37  C)  Pulse:  [87-90] 90  Resp:  [18] 18  BP: (126-155)/(69-96) 143/83  SpO2:  [91 %-94 %] 91 %  I/O last 3 completed shifts:  In: 120 [P.O.:120]  Out: 90 [Drains:90]     , Blood pressure (!)  143/83, pulse 90, temperature 98.6  F (37  C), temperature source Oral, resp. rate 18, height 1.524 m (5'), weight 79.4 kg (175 lb), SpO2 91%.  175 lbs 0 oz  HEENT:  Normocephalic.  PERRLA.  EOM s intact.    Neck:  Supple, non-tender, without lymphadenopathy.  Heart:  No peripheral edema  Lungs:  No SOB  Abdomen:  Soft, non-tender, non-distended.   Skin:  Warm and dry, good capillary refill.  Extremities:  Good radial and dorsalis pedis pulses bilaterally, no edema, cyanosis or clubbing.    NEUROLOGICAL EXAMINATION:   Mental status: alert and oriented x3 speech clear and appropriate   Cranial nerves: II-XII intact  Motor strength:   Biceps: 5/5 right, 5/5 left   Wrist extensors:5/5 right, 5/5 left   Triceps: 5/5 right, 5/5 left   Deltoids: 5/5 right, 5/5 left   Finger flexion: 5/5 right, 5/5 left   Finger abduction:5/5 right, 5/5 left   Hand : 5/5 right, 5/5 left   Hip flexors: 5/5 right, 5/5 left   Quadriceps: 5/5 right, 5/5 left  Ankle dorsiflexion: 5/5 right, 5/5 left    Ankle plantar flexion:5/5 right, 5/5 left   Extensor hallicus longus: 5/5 right, 5/5 left   Sensation:  intact  Reflexes:  Negative Babinski.  Negative Clonus.    Coordination:  Smooth finger to nose testing.   Negative pronator drift.    Gait:  not tested    DIPTI drain 30mL/8hrs     Xray reviewed   IMPRESSION: Straightening of usual cervical spine lordosis. Cervical vertebral body heights are maintained. Posterior fusion hardware at C6-T1. Surgical hardware is intact without fracture or loosening. Fusion across C2-C3 disc space. Mild degenerative   changes anteriorly at C1-C2. No prevertebral soft tissue swelling. Surgical drain over the posterior soft tissues.    Medications   Current Facility-Administered Medications   Medication Dose Route Frequency Provider Last Rate Last Admin     Current Facility-Administered Medications   Medication Dose Route Frequency Provider Last Rate Last Admin    allopurinol (ZYLOPRIM) tablet 200 mg  200 mg Oral  Daily Benji Spaulding MD   200 mg at 12/15/24 0931    atenolol (TENORMIN) tablet 25 mg  25 mg Oral Daily Benji Spaulding MD   25 mg at 12/15/24 0931    dexAMETHasone (DECADRON) tablet 4 mg  4 mg Oral Q12H [facility] (08/20) Miriam Rodríguez PA-C        gabapentin (NEURONTIN) capsule 600 mg  600 mg Oral BID Benji Spaulding MD   600 mg at 12/15/24 2134    levothyroxine (SYNTHROID/LEVOTHROID) tablet 50 mcg  50 mcg Oral Daily Benji Spaulding MD   50 mcg at 12/15/24 0933    linaclotide (LINZESS) capsule 290 mcg  290 mcg Oral QAM Benji Spaulding MD   290 mcg at 12/16/24 0633    lisinopril (ZESTRIL) tablet 5 mg  5 mg Oral Daily Benji Spaulding MD   5 mg at 12/15/24 0931    methocarbamol (ROBAXIN) tablet 750 mg  750 mg Oral Q6H Miriam Rodríguez PA-C   750 mg at 12/16/24 0300    multivitamin, therapeutic (THERA-VIT) tablet 1 tablet  1 tablet Oral Daily Ora Rayo APRN CNP   1 tablet at 12/15/24 0931    [Held by provider] polyethylene glycol (MIRALAX) Packet 17 g  17 g Oral Daily Ora Rayo APRN CNP   17 g at 12/15/24 0931    pramipexole (MIRAPEX) tablet 0.5 mg  0.5 mg Oral BID Benji Spaulding MD   0.5 mg at 12/15/24 2134    senna-docusate (SENOKOT-S/PERICOLACE) 8.6-50 MG per tablet 1 tablet  1 tablet Oral BID Ora Rayo APRN CNP   1 tablet at 12/15/24 2134       Data     CBC RESULTS:   Recent Labs   Lab Test 07/22/22 2328   WBC 6.2   RBC 4.92   HGB 14.9   HCT 44.2   MCV 90   MCH 30.3   MCHC 33.7   RDW 14.1        Basic Metabolic Panel:  Lab Results   Component Value Date     12/14/2024      Lab Results   Component Value Date    POTASSIUM 4.2 12/14/2024    POTASSIUM 4.8 07/22/2022     Lab Results   Component Value Date    CHLORIDE 107 12/14/2024    CHLORIDE 108 07/22/2022     Lab Results   Component Value Date    JOHAN 9.0 12/14/2024     Lab Results   Component Value Date    CO2 21 12/14/2024    CO2 28 07/22/2022     Lab Results   Component Value Date    BUN 17.0 12/14/2024    BUN 16  "07/22/2022     Lab Results   Component Value Date    CR 1.01 12/14/2024     Lab Results   Component Value Date     12/14/2024     12/14/2024    GLC 85 12/13/2024    GLC 95 07/22/2022     INR:  No results found for: \"INR\"     "

## 2024-12-16 NOTE — PLAN OF CARE
Problem: Adult Inpatient Plan of Care  Goal: Optimal Comfort and Wellbeing  Intervention: Monitor Pain and Promote Comfort  Recent Flowsheet Documentation  Taken 12/15/2024 1948 by Keyla Mcmahan RN  Pain Management Interventions: medication (see MAR)     Patient vital signs are at baseline: Yes  Patient able to ambulate as they were prior to admission or with assist devices provided by therapies during their stay:  Yes  Patient MUST void prior to discharge:  Yes  Patient able to tolerate oral intake:  Yes  Pain has adequate pain control using Oral analgesics:  Yes  Does patient have an identified :  Yes  Has goal D/C date and time been discussed with patient:  Yes    Pt A&Ox 4,  utilized, family at bedside. VSS on RA. CMS intact. Dressing changed this shift. Voiding adequately. Up with assist of 1 gb and walker, ambulated in hallway. Pain managed with PRN dilaudid 2 mg and scheduled robaxin, gabapentin and tylenol. IV SL. [place] J brace on when up and out of bed. Drain in place. Discharge home with family pending.

## 2024-12-16 NOTE — PLAN OF CARE
Reviewed AVS with pt and pt's spouse utilizing  services via ipad. Pt reports no questions or concerns at this time. PIV removed, pt has all belongings to discharge home this evening with spouse.

## 2024-12-16 NOTE — PROGRESS NOTES
[facility]    Medicine Progress Note - Hospitalist Service    Date of Admission:  12/13/2024    Assessment & Plan   Torsten La is a 64 year old male admitted s/p C2 laminectomy and cervical fusion.  He is clinically stable.  Neurosurgery paged earlier with question about adding ketorolac in the setting of lisinopril use given BPA regarding drug-drug interaction.  I advised that the proposed dose of 15mg is reasonable for now.  Patient has a creatinine clearance >60 and order was written as prn only (has not currently been administered).  Would not discharge patient on ketorolac or additional NSAIDs.    Patient was discharged before I was able to examine the patient in person.      # s/p C2 laminectomy and cervical fusion  - per neurosurgery    # IVELISSE    # Chronic constipation    # HTN  - lisinopril  - atenolol    # RLS    # Hypothyroidism  - levothyroxine        Omkar Kraft MD  Hospitalist Service  [facility]  Securely message with [employer] (more info)  Text page via [employer] Paging/Directory   ______________________________________________________________________

## 2024-12-16 NOTE — PLAN OF CARE
Goal Outcome Evaluation:  Patient reported some tingling in right palm this AM, neurosurgery notified on AM rounds. DIPTI drain removed per orders, patient tolerated.  [place] dressing in place per neurosurgery request.  Patient reported pain now controlled plan to discharge today.   utilized with cares.  Patient also had device to type on and pen and paper to aide in communication.  Patient using neck brace when up out of bed.      Patient vital signs are at baseline: Yes  Patient able to ambulate as they were prior to admission or with assist devices provided by therapies during their stay:  Yes  Patient MUST void prior to discharge:  Yes  Patient able to tolerate oral intake:  Yes  Pain has adequate pain control using Oral analgesics:  Yes  Does patient have an identified :  Yes  Has goal D/C date and time been discussed with patient:  Yes      Problem: Adult Inpatient Plan of Care  Goal: Absence of Hospital-Acquired Illness or Injury  Intervention: Identify and Manage Fall Risk  Recent Flowsheet Documentation  Taken 12/16/2024 1500 by Carolina Davis RN  Safety Promotion/Fall Prevention: safety round/check completed  Taken 12/16/2024 1300 by Carolnia Davis RN  Safety Promotion/Fall Prevention: safety round/check completed  Taken 12/16/2024 1200 by Carolina Davis RN  Safety Promotion/Fall Prevention: safety round/check completed  Taken 12/16/2024 1100 by Carolina Davis RN  Safety Promotion/Fall Prevention: safety round/check completed  Taken 12/16/2024 1000 by Carolina Davis RN  Safety Promotion/Fall Prevention: safety round/check completed  Taken 12/16/2024 0917 by Carolina Davis RN  Safety Promotion/Fall Prevention: safety round/check completed  Taken 12/16/2024 0800 by Carolina Davis RN  Safety Promotion/Fall Prevention:   activity supervised   assistive device/personal items within reach   increased rounding and observation   increase visualization of patient   lighting  adjusted   mobility aid in reach   nonskid shoes/slippers when out of bed   patient and family education   room door open   room near nurse's station   room organization consistent   safety round/check completed   supervised activity   toileting scheduled  Taken 12/16/2024 0740 by Carolina Davis, RN  Safety Promotion/Fall Prevention: safety round/check completed     Problem: Adult Inpatient Plan of Care  Goal: Optimal Comfort and Wellbeing  Intervention: Monitor Pain and Promote Comfort  Recent Flowsheet Documentation  Taken 12/16/2024 0917 by Carolina Davis, RN  Pain Management Interventions: medication (see MAR)

## 2024-12-16 NOTE — PLAN OF CARE
Problem: Adult Inpatient Plan of Care  Goal: Optimal Comfort and Wellbeing  Outcome: Progressing  Intervention: Monitor Pain and Promote Comfort     Goal Outcome Evaluation:       Patient vital signs are at baseline: Yes  Patient able to ambulate as they were prior to admission or with assist devices provided by therapies during their stay:  Yes, up with assist of 1 with gb and walker with [place] j collar  Patient MUST void prior to discharge:  Yes  Patient able to tolerate oral intake:  Yes  Pain has adequate pain control using Oral analgesics:  Yes, utilizing PRN oral dilaudid and scheduled robaxin  Does patient have an identified :  Yes  Has goal D/C date and time been discussed with patient:  Yes, discharge to home pending drain output. Output of 35 this shift

## 2024-12-18 ENCOUNTER — HOSPITAL ENCOUNTER (EMERGENCY)
Facility: CLINIC | Age: 64
Discharge: HOME | End: 2024-12-18
Attending: EMERGENCY MEDICINE
Payer: COMMERCIAL

## 2024-12-18 ENCOUNTER — APPOINTMENT (OUTPATIENT)
Dept: RADIOLOGY | Facility: CLINIC | Age: 64
End: 2024-12-18
Attending: EMERGENCY MEDICINE
Payer: COMMERCIAL

## 2024-12-18 VITALS
OXYGEN SATURATION: 96 % | HEIGHT: 60 IN | HEART RATE: 59 BPM | DIASTOLIC BLOOD PRESSURE: 72 MMHG | RESPIRATION RATE: 14 BRPM | SYSTOLIC BLOOD PRESSURE: 124 MMHG | WEIGHT: 171 LBS | TEMPERATURE: 97.9 F | BODY MASS INDEX: 33.57 KG/M2

## 2024-12-18 DIAGNOSIS — L29.9 ITCHING: ICD-10-CM

## 2024-12-18 DIAGNOSIS — R07.89 ATYPICAL CHEST PAIN: ICD-10-CM

## 2024-12-18 PROBLEM — G89.29 CHRONIC MIDLINE LOW BACK PAIN WITH RIGHT-SIDED SCIATICA: Status: ACTIVE | Noted: 2019-09-09

## 2024-12-18 PROBLEM — M54.41 CHRONIC MIDLINE LOW BACK PAIN WITH RIGHT-SIDED SCIATICA: Status: ACTIVE | Noted: 2019-09-09

## 2024-12-18 PROBLEM — M48.10 DISSEMINATED IDIOPATHIC SKELETAL HYPEROSTOSIS: Status: ACTIVE | Noted: 2022-04-12

## 2024-12-18 LAB
ANION GAP SERPL CALCULATED.3IONS-SCNC: 13 MMOL/L (ref 7–15)
ATRIAL RATE - MUSE: 65 BPM
BASOPHILS # BLD AUTO: 0 10E3/UL (ref 0–0.2)
BASOPHILS NFR BLD AUTO: 0 %
BUN SERPL-MCNC: 24.3 MG/DL (ref 8–23)
CALCIUM SERPL-MCNC: 9.1 MG/DL (ref 8.8–10.4)
CHLORIDE SERPL-SCNC: 103 MMOL/L (ref 98–107)
CREAT SERPL-MCNC: 1.04 MG/DL (ref 0.67–1.17)
DIASTOLIC BLOOD PRESSURE - MUSE: 88 MMHG
EGFRCR SERPLBLD CKD-EPI 2021: 81 ML/MIN/1.73M2
EOSINOPHIL # BLD AUTO: 0 10E3/UL (ref 0–0.7)
EOSINOPHIL NFR BLD AUTO: 0 %
ERYTHROCYTE [DISTWIDTH] IN BLOOD BY AUTOMATED COUNT: 12.9 % (ref 10–15)
GLUCOSE SERPL-MCNC: 132 MG/DL (ref 70–99)
HCO3 SERPL-SCNC: 24 MMOL/L (ref 22–29)
HCT VFR BLD AUTO: 41.9 % (ref 40–53)
HGB BLD-MCNC: 14.9 G/DL (ref 13.3–17.7)
IMM GRANULOCYTES # BLD: 0.1 10E3/UL
IMM GRANULOCYTES NFR BLD: 1 %
INTERPRETATION ECG - MUSE: NORMAL
LYMPHOCYTES # BLD AUTO: 1.3 10E3/UL (ref 0.8–5.3)
LYMPHOCYTES NFR BLD AUTO: 10 %
MCH RBC QN AUTO: 31.9 PG (ref 26.5–33)
MCHC RBC AUTO-ENTMCNC: 35.6 G/DL (ref 31.5–36.5)
MCV RBC AUTO: 90 FL (ref 78–100)
MONOCYTES # BLD AUTO: 0.3 10E3/UL (ref 0–1.3)
MONOCYTES NFR BLD AUTO: 2 %
NEUTROPHILS # BLD AUTO: 12.1 10E3/UL (ref 1.6–8.3)
NEUTROPHILS NFR BLD AUTO: 87 %
NRBC # BLD AUTO: 0 10E3/UL
NRBC BLD AUTO-RTO: 0 /100
P AXIS - MUSE: 26 DEGREES
PLATELET # BLD AUTO: 239 10E3/UL (ref 150–450)
POTASSIUM SERPL-SCNC: 4.3 MMOL/L (ref 3.4–5.3)
PR INTERVAL - MUSE: 178 MS
QRS DURATION - MUSE: 96 MS
QT - MUSE: 410 MS
QTC - MUSE: 426 MS
R AXIS - MUSE: -3 DEGREES
RBC # BLD AUTO: 4.67 10E6/UL (ref 4.4–5.9)
SODIUM SERPL-SCNC: 140 MMOL/L (ref 135–145)
SYSTOLIC BLOOD PRESSURE - MUSE: 154 MMHG
T AXIS - MUSE: 19 DEGREES
TROPONIN T SERPL HS-MCNC: 6 NG/L
TROPONIN T SERPL HS-MCNC: 9 NG/L
VENTRICULAR RATE- MUSE: 65 BPM
WBC # BLD AUTO: 13.9 10E3/UL (ref 4–11)

## 2024-12-18 PROCEDURE — 93005 ELECTROCARDIOGRAM TRACING: CPT | Performed by: EMERGENCY MEDICINE

## 2024-12-18 PROCEDURE — 80048 BASIC METABOLIC PNL TOTAL CA: CPT | Performed by: EMERGENCY MEDICINE

## 2024-12-18 PROCEDURE — 85004 AUTOMATED DIFF WBC COUNT: CPT | Performed by: EMERGENCY MEDICINE

## 2024-12-18 PROCEDURE — 99285 EMERGENCY DEPT VISIT HI MDM: CPT | Mod: 25

## 2024-12-18 PROCEDURE — 36415 COLL VENOUS BLD VENIPUNCTURE: CPT | Performed by: EMERGENCY MEDICINE

## 2024-12-18 PROCEDURE — 250N000013 HC RX MED GY IP 250 OP 250 PS 637: Performed by: EMERGENCY MEDICINE

## 2024-12-18 PROCEDURE — 71046 X-RAY EXAM CHEST 2 VIEWS: CPT

## 2024-12-18 PROCEDURE — 84484 ASSAY OF TROPONIN QUANT: CPT | Performed by: EMERGENCY MEDICINE

## 2024-12-18 PROCEDURE — 85041 AUTOMATED RBC COUNT: CPT | Performed by: EMERGENCY MEDICINE

## 2024-12-18 RX ORDER — TRAMADOL HYDROCHLORIDE 50 MG/1
50-100 TABLET ORAL 3 TIMES DAILY PRN
Qty: 42 TABLET | Refills: 0 | Status: SHIPPED | OUTPATIENT
Start: 2024-12-18

## 2024-12-18 RX ORDER — HYDROXYZINE HYDROCHLORIDE 25 MG/1
25 TABLET, FILM COATED ORAL ONCE
Status: COMPLETED | OUTPATIENT
Start: 2024-12-18 | End: 2024-12-18

## 2024-12-18 RX ADMIN — HYDROXYZINE HYDROCHLORIDE 25 MG: 25 TABLET ORAL at 05:39

## 2024-12-18 ASSESSMENT — COLUMBIA-SUICIDE SEVERITY RATING SCALE - C-SSRS
2. HAVE YOU ACTUALLY HAD ANY THOUGHTS OF KILLING YOURSELF IN THE PAST MONTH?: NO
1. IN THE PAST MONTH, HAVE YOU WISHED YOU WERE DEAD OR WISHED YOU COULD GO TO SLEEP AND NOT WAKE UP?: NO
6. HAVE YOU EVER DONE ANYTHING, STARTED TO DO ANYTHING, OR PREPARED TO DO ANYTHING TO END YOUR LIFE?: NO

## 2024-12-18 ASSESSMENT — ACTIVITIES OF DAILY LIVING (ADL)
ADLS_ACUITY_SCORE: 57

## 2024-12-18 NOTE — DISCHARGE SUMMARY
HOSPITAL DISCHARGE SUMMARY         Patient Name: Torsten La  YOB: 1960  Age: 64 year old  Medical Record Number: [id number]  Primary Physician: Mejia Thompson  Admission Date: 12/13/2024.  Discharge Date: 12/16/2024      He will be discharged from [facility] to home    PRINCIPAL DIAGNOSIS CAUSING ADMISSION: <principal problem not specified>    Discharge Diagnoses:  Active Problems:    Depression, unspecified depression type    S/P cervical spinal fusion    Chronic constipation    CKD (chronic kidney disease) stage 3, GFR 30-59 ml/min (H)    Conductive hearing loss    GERD (gastroesophageal reflux disease)    Gout    Hepatitis B, chronic (H)    Hypertension    Hypothyroidism (acquired)    IVELISSE (obstructive sleep apnea)    Prediabetes    Restless legs syndrome (RLS)      HPI:65 yo male who is status post C3-6 posterior cervical laminectomy for OPLL and spinal canal stenosis by Dr. Solorio in 2018 who presents with chronic numbness and fine motor difficulties and acute right arm and hand pain and numbness, weakness, neck pain, worsening imbalance.  Reviewed his cervical x-ray which shows straightening of the normal cervical lordosis without significant subluxations or dynamic instability.  We also reviewed his MRIs of his cervical spine demonstrated prior laminectomies at cervical 3-6 with residual signal cord abnormality at cervical 3-4.  Has mild to moderate spinal canal stenosis at cervical 6-7 and cervical 7-thoracic 1.  At these levels he has moderate to severe bilateral foraminal narrowing at both of these levels.  He also has OPLL that extends from C2 down through the majority of his cervical spine.  Does have some crowding at the C2-3 level.  He has some symptoms that could be attributable to spinal cord compression but he also appears to have a C8 with possibly C7 radiculopathy on the right.  Recommend cervical 2 laminectomy and right cervical 6-cervical 7 and bilateral cervical 7-thoracic  1 foraminotomies with cervical 6-thoracic 1 posterior instrumented fusion and arthrodesis wit use of stealth.  Risks and benefits were discussed in detail including but not limited to infection, hematoma, nerve damage including paralysis, post op radiculitis, durotomy, lack of a sold bone fusion, hardware malfunction, adjacent segment disease, inadequate symptom relief, risks associated with the use of general anesthesia.     BRIEF HOSPITAL COURSE: Torsten La is a 64 year old male who was admitted on day of surgery and underwent Procedure(s):  cervical 2 laminectomy and  right cervical 6-cervical 7 and bilateral cervical 7-thoracic 1 foraminotomies with cervical 6-thoracic 1 posterior instrumented fusion and arthrodesis with use of stealth..  Surgery was without complications.  Postoperatively he reported incisional neck pain and occasion RUE radicular numbness.   On exam on day of discharge, his strength was 5/5 with no new focal deficits.  PT/OT consultations were obtained to assess function, assist with mobilization, and evaluate for discharge recommendations.  By POD # 0 he was tolerating a regular diet, ambulating, voiding without difficulty, and obtaining good pain control on oral medication.  His incision was clean, dry and intact.   He met all discharge criteria and was stable for discharge.      PROCEDURES PERFORMED DURING HOSPITALIZATION: Procedure(s):  cervical 2 laminectomy and  right cervical 6-cervical 7 and bilateral cervical 7-thoracic 1 foraminotomies with cervical 6-thoracic 1 posterior instrumented fusion and arthrodesis with use of stealth.     COMPLICATIONS IN HOSPITAL:  None.    IMPORTANT PENDING TEST RESULTS: None.    PERTINENT FINDINGS/RESULTS AT DISCHARGE:  None.    CONDITION AT DISCHARGE:  improving    DISCHARGE MEDICATIONS  Robaxin   oxycodone      AFTER DISCHARGE ORDERS AND INSTRUCTIONS:    Follow up with Neurosurgery: in 2  weeks  Follow up appointment with Primary Care Physician: Donna  Maried as needed  Diet: regular diet  Activity: *No lifting, pushing or pulling greater than 5-10 pounds (this is about a gallon of milk).  *No repetitive bending, twisting, or jarring activities  *No overhead work  *No aerobic or strenuous activity  *No activities with increased risk of falls  *You may move about your home as tolerated  *You may walk up and down stairs as tolerated  *You may increase your activity slowly over the next 4-6 weeks    WALKING PROGRAM: As you can tolerate, walk daily-start with 5-10 minutes of continuous walking. This is in addition to the walking that you do as part of your daily activities. Increase the time that you walk by 5 minutes every couple of days. Do not exceed 30-45 minutes of continuous walking until seen in follow-up. Walking is the best exercise after surgery.  **Listen to your body, if you find that you are more painful or fatigued, you may need to proceed more slowly.    **Do not smoke or expose yourself to second hand smoke. Cigarette smoke can delay healing and cause complications.     DRIVING:  We recommend that you do not drive while taking medications for pain or muscle spasms. Always read and follow the advice on your prescription bottle. If you have questions, speak with your pharmacist.  We recommend that you do not drive while wearing a brace, as it could limit your range of motion.    WORK: If you plan to return to work before your 4-6 week appointment, call and discuss with one of the nurses in the neurosurgery office.     USE OF ICE OR  HEAT:  *Icing can decrease post op swelling, thereby helping with post op pain control. Always protect your skin to prevent frostbite or burn.    *For the first 48 hours we recommend ice to the surgical area for 15-20 minutes at a time several times a day.      *Any longer than 15-20 minutes can cause damage to the tissues, including frostbite.     *Allow area to warm for at least 45 minutes or an hour between  treatments.    *Ice as frequently as you wish, so long as the area is warm to touch and has normal sensation before repeating.    *After 48 hours, you may use heat or ice, which ever feels best to you.  Always remember to protect your skin, and to use no greater than 15-20 minutes at a time.     *You can make your own ice bags at home by mixing 3 parts water with 1 part rubbing alcohol in a Ziplock bag and placing in the freezer.  It will not freeze solid.      BOWEL MOVEMENT:  If you did not have a bowel movement (pooped) before you were discharged from the hospital, and do not have a bowel movement within 2 days of discharge. Please call our office and request to speak to a nurse.    Your insurance may not cover medications to treat or prevent  constipation.      There are many  over the counter medications for constipation including: Colace, Senakot, Dulcolax, and Miralax. In addition to medications eat a high fiber diet and drink plenty of water.    If you are taking narcotics, you should being taking medication daily for constipation.      If you develop loose or runny stools, stop taking the medications for constipation.   Warnings: Call [phone] with the following symptoms:    *Temperature 101(fever) or greater or chills  *Redness, swelling or increased drainage from the wound  *Worsening pain not relieved by the pain prescription given  *Worsening or new onset of weakness, or numbness and tingling  *Loss or change in your ability to control bowel or bladder function  *Change in your ability to walk, talk, see or think  *If you did not have a bowel movement before leaving the hospital and your bowels have not moved within 48 hours of your discharge    !!!! IF YOU HAVE A SERIOUS OR THREATENING EMERGENCY, CALL 911 OR COME TO THE EMERGENCY ROOM.  IF YOUR CONDITIONS ALLOWS COME TO THE HOSPITAL WHERE YOUR SURGERY WAS PERFORMED.    QUESTIONS OR CONCERNS:   OUR OFFICE HOURS ARE FROM 9:00 A.M -4:30 P.M.  MONDAY-FRIDAY.  AFTER OFFICE HOURS, CALLS ARE ANSWERED BY THE ON-CALL PROVIDER. PLEASE CALL WITH ROUTINE QUESTIONS DURING OFFICE HOURS. THE ON-CALL PROVIDER WILL NOT REFILL PRESCRIPTIONS.   Wound care:WOUND CARE:  Keep a dressing on the wound until the sutures are removed.  No lotions, soaps, powders, ointments, creams, or patches on incision until the wound is well healed.    Change the dressing daily, more frequently if necessary to keep the wound dry.  If you notice increased or persistent drainage from your wound, contact our office.  You may use an extra large band-aid or 4x4 and tape.  Both can be purchased at your pharmacy.    Sutures are usually removed in 1-2 weeks.  They are sometimes left longer.    Wash your hands before changing the dressing or touching the wound. If someone is helping you change the dressing, ensure that the person washes his/her hands.  Good handwashing can decrease the risk of infection.  If you are unable to see the wound, have someone check the wound daily for redness, swelling or drainage.  A small amount of drainage is normal.       Two days after surgery begin showering. Wash your wound daily. Remove all dressings prior to your shower.  Apply mild soap directly to the wound, form a light lather and rinse with running water.     Do not submerge the wound under water. No baths or swimming for 6 weeks.     If you develop redness, swelling, drainage, or temp 101 or greater, please call our office at [phone].       Miriam Rodríguez PA-C  [facility]  Tel [phone]

## 2024-12-18 NOTE — OP NOTE
NEUROSURGERY OPERATIVE REPORT    PROCEDURE DATE: 12/13/2024    PREOPERATIVE DIAGNOSIS:  Cervical radiculopathy   Cervical spondylosis with myelopathy     POSTOPERATIVE DIAGNOSIS:  Same    PROCEDURE:  1.   Cervical 2 bilateral laminectomies   2.   Right cervical 6-cervical 7 and bilateral cervical 7-thoracic 1 foraminotomies  3.   Cervical 6-thoracic 1 posterior instrumented fusion and arthrodesis with use of stealth    SURGEON: Geneva Abel MD     ASSISTANT: Ora Rayo APRN CNP     INDICATIONS:  65 yo male who is status post C3-6 posterior cervical laminectomy for OPLL and spinal canal stenosis by Dr. Solorio in 2018 who presents with chronic numbness and fine motor difficulties and acute right arm and hand pain and numbness, weakness, neck pain, worsening imbalance.  Reviewed his cervical x-ray which shows straightening of the normal cervical lordosis without significant subluxations or dynamic instability.  We also reviewed his MRIs of his cervical spine demonstrated prior laminectomies at cervical 3-6 with residual signal cord abnormality at cervical 3-4.  Has mild to moderate spinal canal stenosis at cervical 6-7 and cervical 7-thoracic 1.  At these levels he has moderate to severe bilateral foraminal narrowing at both of these levels.  He also has OPLL that extends from C2 down through the majority of his cervical spine.  Does have some crowding at the C2-3 level.  He has some symptoms that could be attributable to spinal cord compression but he also appears to have a C8 with possibly C7 radiculopathy on the right.  Recommend cervical 2 laminectomy and right cervical 6-cervical 7 and bilateral cervical 7-thoracic 1 foraminotomies with cervical 6-thoracic 1 posterior instrumented fusion and arthrodesis wit use of stealth.  Risks and benefits were discussed in detail including but not limited to infection, hematoma, nerve damage including paralysis, post op radiculitis, durotomy, lack of a sold bone  fusion, hardware malfunction, adjacent segment disease, inadequate symptom relief, risks associated with the use of general anesthesia.     PROCEDURE:  After obtaining informed consent, the patient was brought to the operating room with pneumatic stockings in place.  IV antibiotics was administered.  He was intubated under general endotracheal anesthesia.   A che catheter was placed and the patient was turned into the prone position in a Jarvis head crum on the radiolucent laminectomy frame. He was prepped and draped in the usual sterile fashion.         A pre incisional infiltration of local anesthetic was performed along the midline and the incision was opened sharply and extended down to the fascia.  The fascia was opened in one layer with monopolar electrocautery.  A subperiosteal dissection was undertaken to expose the lamina and posterior lateral elements at cervical 2 and remaining lamina and posterior elements at cervical 6-thoracic 1. At this point, we brought in intraoperative O-arm for navigation. We did an intraoperative CT with a Stealth localization guide attached to the thoracic 1 spinous process and after confirming navigation of all instruments (registered separately on the navigational star), we proceeded to place the lateral mass and pedicle screws using stereotactic localization. We used the navigational wand to select an entry site, entered using the usual anatomical markers for the entry through the lateral mass or pedicle. Once the entry site was confirmed, we then proceeded to use the navigational drill to drill through the lateral mass or pedicle. We used navigational monitored taps to under-tap the screw hole by 1 mm and then proceeded to place the selected screw into the hole. At each step we confirmed that the walls of the cortex of the drilled hole were intact using a small ball-tip probe. We then placed lateral mass screws at C6 and pedicle screws at cervical 7 and thoracic 1.  We  then proceeded to copiously irrigate the incision with antibiotic-containing saline and achieved hemostasis.  We then performed a decompressive laminectomy at C2 saving the harvested autologous bone for replacement at the end of the procedure at the graft site.  We remove the spinous process and lamina with a combination of Leksell rongeurs and high speed air drill, and Kerrison rongeurs. We next removed the ligamentum. We then moved to the right cervical 6- and bilateral 7-thoracic 1 levels and performed our foraminotomies using high speed air drill and Kerrison rongeurs. Once completed the foramen appeared open and decompressed. Once this was completed, we then placed rods bilaterally at cervical 6-thoracic 1 and locked into place in the top-loaded heads of the screws and secured with set screws tightened to break off torque.  A drain was tunneled out of the wound.  Wound was again irrigated with antibiotic containing irrigation and hemostasis was achieved.     Due to the nature and complexity of the case an assistant was required for the duration of the case for positioning,retraction, hemostasis, and closure.       The incision was closed in layers with 0 Vicryl for the fascia and muscle layer, inverted 2-0 PDS for subcutaneous tissues and Ethilon for the skin edges. Sponge and needle counts were correct prior to closure x 2. Sterile dressings were placed. Patient tolerated the procedure well.      The patient  was turned from the radiolucent frame onto a gurney, extubated and taken to the recovery room at their neurological baseline with no new deficits appreciated.     ESTIMATED BLOOD LOSS: 150 cc    SPECIMENS: * No specimens in log *    FINDINGS: none    IMPLANTS:   Implant Name Type Inv. Item Serial No.  Lot No. LRB No. Used Action   KIT BNGF 6CC DMNR HOLLEY FBR ACCELERATE [facility] [id number] - [id number] Bone/Tissue/Biologic KIT BNGF 6CC DMNR HOLLEY FBR ACCELERATE [facility] [id number]  [employer]  N/A 1 Implanted   BONE GRAFT PUTTY MAGNETOS EASYPACK PUTTY 2.5CC 703-050-US - [id number] Bone/Tissue Synthetic BONE GRAFT PUTTY MAGNETOS EASYPACK PUTTY 2.5CC 703-050-US  [employer]  N/A 1 Implanted   IMP SET SCREW [employer] INFINITY [id number] - [id number] Metallic Hardware/[place] IMP SET SCREW [employer] INFINITY [id number]  [employer]  N/A 6 Implanted   IMP SCREW INFINITY SPINE [id number] - [id number] Metallic Hardware/[place] IMP SCREW INFINITY SPINE [id number]  [employer]  N/A 2 Implanted   SCREW BN 20MM 3.5MM MA NS INFNT SPNE OC UPR THOR LF - [id number] Metallic Hardware/[place] SCREW BN 20MM 3.5MM MA NS INFNT SPNE OC UPR THOR LF  [employer]  N/A 3 Implanted   IMP SCREW INFINITY SPINE UPPER THOR 22MMX3.5MM [id number] - [id number] Metallic Hardware/[place] IMP SCREW INFINITY SPINE UPPER THOR 22MMX3.5MM [id number]  [employer]  N/A 1 Implanted   NAIF SPNL 40MM 3.5MM PCUT - [id number] Metallic Hardware/[place] NAIF SPNL 40MM 3.5MM PCUT  [employer]  N/A 2 Implanted       Geneva Abel MD

## 2024-12-18 NOTE — ED PROVIDER NOTES
EMERGENCY DEPARTMENT ENCOUNTER      NAME: Torsten La  AGE: 64 year old male  YOB: 1960  MRN: [id number]  EVALUATION DATE & TIME: No admission date for patient encounter.    PCP: Mejia Thompson    ED PROVIDER: Dain Davila D.O.      Chief Complaint   Patient presents with    Pruritis    Chills       FINAL IMPRESSION:  1. Itching    2. Atypical chest pain        ED COURSE & MEDICAL DECISION MAKIN:15 AM I met with the patient to gather history and to perform my initial exam. I discussed the plan for care while in the Emergency Department.  9:10 AM I reevaluated the patient. He is feeling comfortable for discharge to home. We discussed the plan for discharge and the patient is agreeable. Reviewed supportive cares, symptomatic treatment, outpatient follow up, and reasons to return to the Emergency Department. All questions and concerns were addressed. Patient to be discharged by ED RN.          Pertinent Labs & Imaging studies reviewed. (See chart for details)  64 year old male presents to the Emergency Department for evaluation of pruritus, and transient episode of chest discomfort.  He does report that he feels that the chest discomfort is related to his surgery and the brace on his chest, but he reports that he had some pruritus after taking his pain medication today.  This morning he took a dose of pain medication he was prescribed after the surgery, and shortly thereafter took a dose of old tramadol that he had still at home.  Was following this it was concerned that he could have potentially overdosed and decided to take some Narcan.  He did have some pruritus after taking the tramadol which she does feel has improved some after using the Narcan.  On exam he has no hives, rash, or other evidence of an allergic reaction including no evidence of be suggestive of anaphylactic reaction.  Due to the transient episode of chest pain, I did decide to perform an EKG and simple labs.  EKG does not show  any evidence of ischemia or arrhythmia, 2 troponins are both negative making ACS unlikely.  Clinically, though he did just have surgery he is not show any signs of be suggestive of pulmonary embolism as he is not tachycardic, not hypoxic, not short of breath, and his chest pain is since resolved.  Chest x-ray did not show any evidence of mediastinal widening that would be suggestive of dissection, evidence of pneumothorax, or any consolidation suggestive of pneumonia.  At this time I believe this patient is safe for discharge home with outpatient follow-up with primary care provider.  He and family are comfortable with this plan.  Return precautions were discussed.    Medical Decision Making  Obtained supplemental history:Supplemental history obtained?: Documented in chart and Family Member/Significant Other  Reviewed external records: External records reviewed?: Documented in chart and Outpatient Record: [facility] on 12/13/2024  Care impacted by chronic illness:Chronic Kidney Disease and Hypertension  Did you consider but not order tests?: Work up considered but not performed and documented in chart, if applicable  Did you interpret images independently?: Independent interpretation of ECG and images noted in documentation, when applicable.  Consultation discussion with other provider:Did you involve another provider (consultant, , pharmacy, etc.)?: No   Discharge. No recommendations on prescription strength medication(s). See documentation for any additional details.    MIPS: Not Applicable        At the conclusion of the encounter I discussed the results of all of the tests and the disposition. The questions were answered. The patient or family acknowledged understanding and was agreeable with the care plan.        HPI    Patient information was obtained from: the patient and family member    Use of : Yes (MARTII) - Language JEN La is a 64 year old male who presents for  "evaluation of chest pain, pruritus, and chills.     The patient reports he was on his computer all day when a pain started slowly on his left-sided chest. Denies shortness of breath. He then began to feel itchy \"all over\" and chills. He took his medications at 2:00 AM (1000 mg of Tylenol, 4 mg of Hydromorphone, Miralax, and Dexamethasone). He took 2 old pain pills, Tramadol, additionally since the pain was not improving. Then he took 1 dose of narcan because he had concerns for possible medication overdose. States the pain and itchiness improved. Notes he spoke with his online physician who told him to be evaluated for using an overdue nasal spray this morning. No near-syncopal episode.     No other complaints at this time.    Per Chart Review: the patient presented to [facility] on 12/[facility]2024 and underwent cervical 2 laminectomy and right cervical 6-cervical 7 and bilateral cervical 7-thoracic 1 foraminotomies with cervical 6-thoracic 1 posterior instrumented fusion and arthrodesis with use of stealth, bilateral, for cervical radiculopathy by Dr. Abel.       PAST MEDICAL HISTORY:  Past Medical History:   Diagnosis Date    Depressive disorder     Gastroesophageal reflux disease     Gout     Hypertension     Sleep apnea     Thyroid disease        PAST SURGICAL HISTORY:  Past Surgical History:   Procedure Laterality Date    GENITOURINARY SURGERY      IMPLANT PROSTHESIS PENIS INFLATABLE N/A 12/28/2023    Procedure: PLACEMENT OF INFLATABLE PENILE PROSTHESIS;  Surgeon: Henok Harden MD;  Location: [facility] OR    LAMINECTOMY CERIVCAL POSTERIOR ONE LEVEL N/A 12/13/2024    Procedure: cervical 2 laminectomy and;  Surgeon: Geneva Abel MD;  Location: [facility] OR    OPTICAL TRACKING SYSTEM FUSION POSTERIOR CERVICAL TWO LEVELS Bilateral 12/13/2024    Procedure: right cervical 6-cervical 7 and bilateral cervical 7-thoracic 1 foraminotomies with cervical 6-thoracic 1 posterior instrumented " fusion and arthrodesis with use of stealth.;  Surgeon: Geneva Abel MD;  Location: [facility] OR         CURRENT MEDICATIONS:    No current facility-administered medications for this encounter.     Current Outpatient Medications   Medication Sig Dispense Refill    allopurinol (ZYLOPRIM) 100 MG tablet Take 200 mg by mouth daily.      atenolol (TENORMIN) 25 MG tablet Take 25 mg by mouth daily      Cyanocobalamin (VITAMIN B 12) 500 MCG TABS Take 1 tablet by mouth daily.      dexAMETHasone (DECADRON) 4 MG tablet Take 1 tablet (4 mg) by mouth every 12 hours. 4 tablet 0    gabapentin (NEURONTIN) 300 MG capsule Take 600 mg by mouth 2 times daily.      HYDROmorphone (DILAUDID) 2 MG tablet Take 1-2 tablets (2-4 mg) by mouth every 4 hours as needed for moderate pain. 42 tablet 0    levothyroxine (SYNTHROID/LEVOTHROID) 50 MCG tablet Take 1 tablet by mouth daily.      LINZESS 290 MCG capsule Take 290 mcg by mouth every morning      lisinopril (ZESTRIL) 5 MG tablet Take 5 mg by mouth daily      methocarbamol (ROBAXIN) 750 MG tablet Take 1 tablet (750 mg) by mouth every 6 hours. 42 tablet 0    Omega-3 1000 MG capsule Take 1 g by mouth daily.      polyethylene glycol (MIRALAX) 17 g packet Take 1 packet by mouth daily      pramipexole (MIRAPEX) 0.25 MG tablet Take 0.5 mg by mouth 2 times daily      sodium phosphate (FLEET ENEMA) 7-19 GM/118ML rectal enema Place 1 enema rectally daily as needed for constipation.      tacrolimus (PROTOPIC) 0.1 % external ointment Apply topically 2 times daily as needed. Apply to eye brows      triamcinolone (KENALOG) 0.025 % external ointment Apply topically 2 times daily as needed. Apply to lips      Vitamin D3 (VITAMIN D) 10 MCG (400 UNIT) tablet Take 1 tablet by mouth daily.           ALLERGIES:  Allergies   Allergen Reactions    Duloxetine      nightmares      Fluoxetine Nausea     Other reaction(s): GI Upset    Metoprolol Headache     Atenolol ok  Stopped due to persistent  headache--tolerates atenolol without problem      Oxycodone     Sertraline Unknown    Paroxetine Other (See Comments)     Other reaction(s): Impotence         FAMILY HISTORY:  History reviewed. No pertinent family history.    SOCIAL HISTORY:  Social History     Socioeconomic History    Marital status:    Tobacco Use    Smoking status: Never   Substance and Sexual Activity    Alcohol use: Not Currently     Comment: small amount over Yey[employer]    Drug use: Never   Social History Narrative    8/7/2019: Wife is bedside.     Social Drivers of Health     Financial Resource Strain: Low Risk  (10/22/2024)    Received from [facility]    Financial Resource Strain     Difficulty of Paying Living Expenses: 3   Food Insecurity: No Food Insecurity (10/22/2024)    [facility]    Food Insecurity     Do you worry your food will run out before you are able to buy more?: 1   Transportation Needs: No Transportation Needs (10/22/2024)    [facility]    Transportation Needs     Does lack of transportation keep you from medical appointments?: 1     Does lack of transportation keep you from work, meetings or getting things that you need?: 1   Social Connections: Socially Integrated (10/22/2024)    Received from [facility]    Social Connections     Do you often feel lonely or isolated from those around you?: 0   Interpersonal Safety: Unknown (12/15/2024)    Interpersonal Safety     Do you feel physically and emotionally safe where you currently live?: Patient unable to answer     Within the past 12 months, have you been hit, slapped, kicked or otherwise physically hurt by someone?: Patient unable to answer     Within the past 12 months, have you been humiliated or emotionally abused in other ways by your partner or ex-partner?: Patient unable to answer   Housing Stability:  Low Risk  (10/22/2024)    Received from [facility]    Housing Stability     What is your housing situation today?: 1       VITALS:  Patient Vitals for the past 24 hrs:   BP Temp Temp src Pulse Resp SpO2 Height Weight   12/18/24 0919 124/72 -- -- 59 14 96 % -- --   12/18/24 0831 -- -- -- -- -- 96 % -- --   12/18/24 0826 -- -- -- -- -- 97 % -- --   12/18/24 0715 (!) 140/81 -- -- 74 13 94 % -- --   12/18/24 0643 (!) 170/101 -- -- 80 22 95 % -- --   12/18/24 0533 (!) 184/106 97.9  F (36.6  C) Oral 94 20 96 % 1.524 m (5') 77.6 kg (171 lb)       PHYSICAL EXAM    VITAL SIGNS: /72 (BP Location: Left arm)   Pulse 59   Temp 97.9  F (36.6  C) (Oral)   Resp 14   Ht 1.524 m (5')   Wt 77.6 kg (171 lb)   SpO2 96%   BMI 33.40 kg/m      General Appearance: Well-appearing, well-nourished, no acute distress.   Head:  Normocephalic, without obvious abnormality, atraumatic  Eyes:  PERRL, conjunctiva/corneas clear, EOM's intact,  ENT:  Lips, mucosa, and tongue normal, membranes are moist without pallor  Neck:  Normal ROM, symmetrical, trachea midline    Cardio:  Regular rate and rhythm, no murmur, rub or gallop, 2+ pulses symmetric in all extremities  Pulm:  Clear to auscultation bilaterally, respirations unlabored,  Musculoskeletal: Full ROM, no edema, no cyanosis, good ROM of major joints. Surgical C-collar in place.   Integument:  Warm, Dry, No erythema, No rash.    Neurologic:  Alert & oriented.  No focal deficits appreciated.    Psychiatric:  Affect normal, Judgment normal, Mood normal.      LABS  Results for orders placed or performed during the hospital encounter of 12/18/24 (from the past 24 hours)   CBC with platelets + differential    Narrative    The following orders were created for panel order CBC with platelets + differential.  Procedure                               Abnormality         Status                     ---------                               -----------          ------                     CBC with platelets and d...[[id number]]  Abnormal            Final result                 Please view results for these tests on the individual orders.   Basic metabolic panel   Result Value Ref Range    Sodium 140 135 - 145 mmol/L    Potassium 4.3 3.4 - 5.3 mmol/L    Chloride 103 98 - 107 mmol/L    Carbon Dioxide (CO2) 24 22 - 29 mmol/L    Anion Gap 13 7 - 15 mmol/L    Urea Nitrogen 24.3 (H) 8.0 - 23.0 mg/dL    Creatinine 1.04 0.67 - 1.17 mg/dL    GFR Estimate 81 >60 mL/min/1.73m2    Calcium 9.1 8.8 - 10.4 mg/dL    Glucose 132 (H) 70 - 99 mg/dL   Troponin T, High Sensitivity   Result Value Ref Range    Troponin T, High Sensitivity 6 <=22 ng/L   CBC with platelets and differential   Result Value Ref Range    WBC Count 13.9 (H) 4.0 - 11.0 10e3/uL    RBC Count 4.67 4.40 - 5.90 10e6/uL    Hemoglobin 14.9 13.3 - 17.7 g/dL    Hematocrit 41.9 40.0 - 53.0 %    MCV 90 78 - 100 fL    MCH 31.9 26.5 - 33.0 pg    MCHC 35.6 31.5 - 36.5 g/dL    RDW 12.9 10.0 - 15.0 %    Platelet Count 239 150 - 450 10e3/uL    % Neutrophils 87 %    % Lymphocytes 10 %    % Monocytes 2 %    % Eosinophils 0 %    % Basophils 0 %    % Immature Granulocytes 1 %    NRBCs per 100 WBC 0 <1 /100    Absolute Neutrophils 12.1 (H) 1.6 - 8.3 10e3/uL    Absolute Lymphocytes 1.3 0.8 - 5.3 10e3/uL    Absolute Monocytes 0.3 0.0 - 1.3 10e3/uL    Absolute Eosinophils 0.0 0.0 - 0.7 10e3/uL    Absolute Basophils 0.0 0.0 - 0.2 10e3/uL    Absolute Immature Granulocytes 0.1 <=0.4 10e3/uL    Absolute NRBCs 0.0 10e3/uL   Chest XR,  PA & LAT    Narrative    EXAM: XR CHEST 2 VIEWS  LOCATION: [facility]  DATE: 12/18/2024    INDICATION: Chest pain  COMPARISON: 4/6/2018      Impression    IMPRESSION: No pleural fluid or pneumothorax. No airspace disease or edema. Normal size of the heart.   Troponin T, High Sensitivity   Result Value Ref Range    Troponin T, High Sensitivity 9 <=22 ng/L         RADIOLOGY  Chest XR,  PA & LAT    Final Result   IMPRESSION: No pleural fluid or pneumothorax. No airspace disease or edema. Normal size of the heart.             EKG:    Rate: 65 bpm  Rhythm: Normal Sinus Rhythm  Axis: Normal  Interval: Normal  Conduction: Normal  QRS: Normal  ST: Normal  T-wave: Normal  QT: Not prolonged  Comparison EKG: No significant change when compared to 6 April 2018  Impression:  No acute ischemic change   I have independently reviewed and interpreted today's EKG, pending Cardiologist read        MEDICATIONS GIVEN IN THE EMERGENCY:  Medications   hydrOXYzine HCl (ATARAX) tablet 25 mg (25 mg Oral $Given 12/18/24 0539)       NEW PRESCRIPTIONS STARTED AT TODAY'S ER VISIT  Discharge Medication List as of 12/18/2024  9:35 AM           I, Lc La, am serving as a scribe to document services personally performed by Dain Davila D.O., based on my observations and the provider's statements to me.  I, Dain Davila D.O., attest that Lc La is acting in a scribe capacity, has observed my performance of the services and has documented them in accordance with my direction.     Dain Davila D.O.  Emergency Medicine  [facility] EMERGENCY ROOM  [address] [address]  [phone]  Dept: [phone]       Dain Davila,   12/18/24 1028

## 2024-12-18 NOTE — DISCHARGE INSTRUCTIONS
As a precaution we have placed referral for cardiology follow-up due to your episode of chest pain that you had.  We did not see any obvious emergent cause including no evidence that you had any heart injury today, however do believe it would be appropriate for a follow-up for risk stratification to ensure that you do not have a future event.

## 2024-12-18 NOTE — ED TRIAGE NOTES
Pt comes in due to itchiness and chills. He had surgery done on the 13th for his cervical spinal stenosis. He had pain at 0200 this morning and took his normal evening home medicines with correct doses- 1000 mg Tylenol, 4 mg of Hydromorphone, Miralax, Dexamethasone. The pain didn't get any better so he took 2 old pain pills- Tramadol. He thought he had taken too much so he took 1 dose of Narcan.    Pt's main concern is his generalized itchiness. Pain is controlled right now.      Triage Assessment (Adult)       Row Name 12/18/24 0535          Triage Assessment    Airway WDL WDL        Respiratory WDL    Respiratory WDL WDL        Skin Circulation/Temperature WDL    Skin Circulation/Temperature WDL WDL        Cardiac WDL    Cardiac WDL WDL        Peripheral/Neurovascular WDL    Peripheral Neurovascular WDL WDL        Cognitive/Neuro/Behavioral WDL    Cognitive/Neuro/Behavioral WDL WDL

## 2024-12-19 ENCOUNTER — ALLIED HEALTH/NURSE VISIT (OUTPATIENT)
Dept: NEUROSURGERY | Facility: CLINIC | Age: 64
End: 2024-12-19

## 2024-12-19 ENCOUNTER — OFFICE VISIT (OUTPATIENT)
Dept: CARDIOLOGY | Facility: CLINIC | Age: 64
End: 2024-12-19
Attending: EMERGENCY MEDICINE
Payer: COMMERCIAL

## 2024-12-19 ENCOUNTER — ORDERS ONLY (AUTO-RELEASED) (OUTPATIENT)
Dept: CARDIOLOGY | Facility: CLINIC | Age: 64
End: 2024-12-19

## 2024-12-19 VITALS
BODY MASS INDEX: 31.44 KG/M2 | WEIGHT: 161 LBS | SYSTOLIC BLOOD PRESSURE: 120 MMHG | DIASTOLIC BLOOD PRESSURE: 73 MMHG | HEART RATE: 62 BPM | RESPIRATION RATE: 14 BRPM

## 2024-12-19 DIAGNOSIS — I10 PRIMARY HYPERTENSION: ICD-10-CM

## 2024-12-19 DIAGNOSIS — R07.89 ATYPICAL CHEST PAIN: ICD-10-CM

## 2024-12-19 DIAGNOSIS — R07.89 ATYPICAL CHEST PAIN: Primary | ICD-10-CM

## 2024-12-19 DIAGNOSIS — Z98.890 S/P SPINAL SURGERY: Primary | ICD-10-CM

## 2024-12-19 DIAGNOSIS — R06.09 EXERTIONAL DYSPNEA: ICD-10-CM

## 2024-12-19 NOTE — PROGRESS NOTES
HEART CARE ENCOUNTER CONSULTATON NOTE      [facility]  [phone]      Assessment/Recommendations   Assessment:   Atypical chest pain   Exertional Dyspnea  Hypertension  Chronic kidney disease  Hypothyroidism  Family history of premature coronary artery disease in his brother, bypass in 50s    Plan:  Recommend Lexiscan nuclear stress test to assess atypical chest pain dyspnea exertion  Recommend Zio patch monitor given prior history of syncope  Continue lisinopril for hypertension, continue atenolol for hypertension (may be better in future to switch to metoprolol due to renal clearance).      Will follow-up with results via [employer].  Patient requires          History of Present Illness/Subjective    HPI: Torsten La is a 64 year old male hypertension, prevascular disease presents to cardiology clinic in rapid access for atypical chest pain and dyspnea exertion seen in the emergency department.    Patient was recently seen emergency department was noted to have chest pain.  This was after he had a allergic reaction to opioid medication.  He is taking opioid medications for chronic spine pain.  He states that he took opiate medication resulting in hives followed by chest discomfort and diaphoresis.  He presented to the emergency department where serial troponins were negative.  EKG demonstrated sinus rhythm with left ventricular hypertrophy.  Given these findings he was present to rapid access.  He has had no further episodes of chest pain.  He does endorse dyspnea with ambulating stairs.  He is at risk due to family history of premature coronary artery disease and his history of peripheral vascular disease.    Reviewed ED provider notes     Physical Examination  Review of Systems   Vitals: /73 (BP Location: Right arm, Patient Position: Sitting, Cuff Size: Adult Regular)   Pulse 62   Resp 14   Wt 73 kg (161 lb)   BMI 31.44 kg/m    BMI= Body mass index is 31.44  kg/m .  Wt Readings from Last 3 Encounters:   12/18/24 77.6 kg (171 lb)   12/13/24 79.4 kg (175 lb)   11/14/24 72.6 kg (160 lb)        Pleasant,  present   ENT/Mouth: membranes moist, no oral lesions or bleeding gums.   Neck brace in place   EYES:  no scleral icterus, normal conjunctivae       Chest/Lungs:   lungs are clear to auscultation, no rales or wheezing, no sternal scar, equal chest wall expansion    Cardiovascular:   Regular. Normal first and second heart sounds with no murmurs, rubs, or gallops; the carotid, radial and posterior tibial pulses are intact, Jugular venous pressure normal, n edema bilaterally    Abdomen:  no tenderness; bowel sounds are present   Extremities: no cyanosis or clubbing   Skin: no xanthelasma, warm.    Neurologic: normal  bilateral, no tremors     Psychiatric: alert and oriented x3, calm        Please refer above for cardiac ROS details.        Medical History  Surgical History Family History Social History   Past Medical History:   Diagnosis Date    Depressive disorder     Gastroesophageal reflux disease     Gout     Hypertension     Sleep apnea     Thyroid disease      Past Surgical History:   Procedure Laterality Date    GENITOURINARY SURGERY      IMPLANT PROSTHESIS PENIS INFLATABLE N/A 12/28/2023    Procedure: PLACEMENT OF INFLATABLE PENILE PROSTHESIS;  Surgeon: Henok Harden MD;  Location: [facility] OR    LAMINECTOMY CERIVCAL POSTERIOR ONE LEVEL N/A 12/13/2024    Procedure: cervical 2 laminectomy and;  Surgeon: Geneva Abel MD;  Location: [facility] OR    OPTICAL TRACKING SYSTEM FUSION POSTERIOR CERVICAL TWO LEVELS Bilateral 12/13/2024    Procedure: right cervical 6-cervical 7 and bilateral cervical 7-thoracic 1 foraminotomies with cervical 6-thoracic 1 posterior instrumented fusion and arthrodesis with use of stealth.;  Surgeon: Geneva Abel MD;  Location: [facility] OR     No family history on file.     Social History      Socioeconomic History    Marital status:      Spouse name: Not on file    Number of children: Not on file    Years of education: Not on file    Highest education level: Not on file   Occupational History    Not on file   Tobacco Use    Smoking status: Never    Smokeless tobacco: Not on file   Substance and Sexual Activity    Alcohol use: Not Currently     Comment: small amount over [place]    Drug use: Never    Sexual activity: Not on file   Other Topics Concern    Parent/sibling w/ CABG, MI or angioplasty before 65F 55M? Not Asked   Social History Narrative    8/7/2019: Wife is bedside.     Social Drivers of Health     Financial Resource Strain: Low Risk  (10/22/2024)    Received from [employer]    Financial Resource Strain     Difficulty of Paying Living Expenses: 3     Difficulty of Paying Living Expenses: Not on file   Food Insecurity: No Food Insecurity (10/22/2024)    Received from [employer]    Food Insecurity     Do you worry your food will run out before you are able to buy more?: 1   Transportation Needs: No Transportation Needs (10/22/2024)    Received from [employer]    Transportation Needs     Does lack of transportation keep you from medical appointments?: 1     Does lack of transportation keep you from work, meetings or getting things that you need?: 1   Physical Activity: Not on file   Stress: Not on file   Social Connections: Socially Integrated (10/22/2024)    Received from [employer]    Social Connections     Do you often feel lonely or isolated from those around you?: 0   Interpersonal Safety: Unknown (12/15/2024)    Interpersonal Safety     Do you feel physically and emotionally safe where you currently live?: Patient unable to answer     Within the past 12 months, have you been hit, slapped, kicked or otherwise physically hurt by someone?: Patient  unable to answer     Within the past 12 months, have you been humiliated or emotionally abused in other ways by your partner or ex-partner?: Patient unable to answer   Housing Stability: Low Risk  (10/22/2024)    Received from [facility]    Housing Stability     What is your housing situation today?: 1           Medications  Allergies   Current Outpatient Medications   Medication Sig Dispense Refill    allopurinol (ZYLOPRIM) 100 MG tablet Take 200 mg by mouth daily.      atenolol (TENORMIN) 25 MG tablet Take 25 mg by mouth daily      Cyanocobalamin (VITAMIN B 12) 500 MCG TABS Take 1 tablet by mouth daily.      dexAMETHasone (DECADRON) 4 MG tablet Take 1 tablet (4 mg) by mouth every 12 hours. 4 tablet 0    gabapentin (NEURONTIN) 300 MG capsule Take 600 mg by mouth 2 times daily.      HYDROmorphone (DILAUDID) 2 MG tablet Take 1-2 tablets (2-4 mg) by mouth every 4 hours as needed for moderate pain. 42 tablet 0    levothyroxine (SYNTHROID/LEVOTHROID) 50 MCG tablet Take 1 tablet by mouth daily.      LINZESS 290 MCG capsule Take 290 mcg by mouth every morning      lisinopril (ZESTRIL) 5 MG tablet Take 5 mg by mouth daily      methocarbamol (ROBAXIN) 750 MG tablet Take 1 tablet (750 mg) by mouth every 6 hours. 42 tablet 0    naloxone (NARCAN) 4 MG/0.1ML nasal spray Spray 1 spray (4 mg) into one nostril alternating nostrils as needed for opioid reversal. every 2-3 minutes until assistance arrives 2 each 0    Omega-3 1000 MG capsule Take 1 g by mouth daily.      polyethylene glycol (MIRALAX) 17 g packet Take 1 packet by mouth daily      pramipexole (MIRAPEX) 0.25 MG tablet Take 0.5 mg by mouth 2 times daily      sodium phosphate (FLEET ENEMA) 7-19 GM/118ML rectal enema Place 1 enema rectally daily as needed for constipation.      tacrolimus (PROTOPIC) 0.1 % external ointment Apply topically 2 times daily as needed. Apply to eye brows      traMADol (ULTRAM) 50 MG tablet Take 1-2 tablets  "( mg) by mouth 3 times daily as needed for pain. 42 tablet 0    triamcinolone (KENALOG) 0.025 % external ointment Apply topically 2 times daily as needed. Apply to lips      Vitamin D3 (VITAMIN D) 10 MCG (400 UNIT) tablet Take 1 tablet by mouth daily.         Allergies   Allergen Reactions    Duloxetine      nightmares      Fluoxetine Nausea     Other reaction(s): GI Upset    Metoprolol Headache     Atenolol ok  Stopped due to persistent headache--tolerates atenolol without problem      Oxycodone     Sertraline Unknown    Paroxetine Other (See Comments)     Other reaction(s): Impotence            Lab Results    Chemistry/lipid CBC Cardiac Enzymes/BNP/TSH/INR   No results for input(s): \"CHOL\", \"HDL\", \"LDL\", \"TRIG\", \"CHOLHDLRATIO\" in the last 17059 hours.  No results for input(s): \"LDL\" in the last 83937 hours.  Recent Labs   Lab Test 12/18/24  0640      POTASSIUM 4.3   CHLORIDE 103   CO2 24   *   BUN 24.3*   CR 1.04   GFRESTIMATED 81   JOHAN 9.1     Recent Labs   Lab Test 12/18/24  0640 12/14/24  0711 07/22/22  2328   CR 1.04 1.01 1.16     Recent Labs   Lab Test 02/28/20  1133   A1C 5.6          Recent Labs   Lab Test 12/18/24  0640   WBC 13.9*   HGB 14.9   HCT 41.9   MCV 90        Recent Labs   Lab Test 12/18/24  0640 07/22/22  2328 04/06/21  0030   HGB 14.9 14.9 15.0    Recent Labs   Lab Test 04/06/21  0030 04/06/18  2237 04/06/18  1805   TROPONINI <0.01 <0.01 <0.01     No results for input(s): \"BNP\", \"NTBNPI\", \"NTBNP\" in the last 53664 hours.  Recent Labs   Lab Test 04/06/18  1805   TSH 3.14     No results for input(s): \"INR\" in the last 63222 hours.     Po Gutierrez DO  Cardiologist     40 minutes spent by me on the date of the encounter doing chart review, history and exam, documentation and further activities per the note        The longitudinal plan of care for the diagnosis(es)/condition(s) as documented were addressed during this visit. Due to the added complexity in care, I " will continue to support PENG in the subsequent management and with ongoing continuity of care.  Will follow-up on test results

## 2024-12-19 NOTE — LETTER
12/19/2024    Meg Tinajero MD  [address]  [facility] [address]    RE: Torsten La       Dear Colleague,     I had the [facility] of [facility] Tor[facility] La in the [facility].    HEART CARE ENCOUNTER CONSULTATON NOTE      [facility]  [phone]      Assessment/Recommendations   Assessment:   Atypical chest pain   Exertional Dyspnea  Hypertension  Chronic kidney disease  Hypothyroidism  Family history of premature coronary artery disease in his brother, bypass in 50s    Plan:  Recommend Lexiscan nuclear stress test to assess atypical chest pain dyspnea exertion  Recommend Zio patch monitor given prior history of syncope  Continue lisinopril for hypertension, continue atenolol for hypertension (may be better in future to switch to metoprolol due to renal clearance).      Will follow-up with results via [employer].  Patient requires          History of Present Illness/Subjective    HPI: Torsten La is a 64 year old male hypertension, prevascular disease presents to cardiology clinic in rapid access for atypical chest pain and dyspnea exertion seen in the emergency department.    Patient was recently seen emergency department was noted to have chest pain.  This was after he had a allergic reaction to opioid medication.  He is taking opioid medications for chronic spine pain.  He states that he took opiate medication resulting in hives followed by chest discomfort and diaphoresis.  He presented to the emergency department where serial troponins were negative.  EKG demonstrated sinus rhythm with left ventricular hypertrophy.  Given these findings he was present to rapid access.  He has had no further episodes of chest pain.  He does endorse dyspnea with ambulating stairs.  He is at risk due to family history of premature coronary artery disease and his history of peripheral vascular disease.    Reviewed ED provider notes     Physical Examination  Review of  Systems   Vitals: /73 (BP Location: Right arm, Patient Position: Sitting, Cuff Size: Adult Regular)   Pulse 62   Resp 14   Wt 73 kg (161 lb)   BMI 31.44 kg/m    BMI= Body mass index is 31.44 kg/m .  Wt Readings from Last 3 Encounters:   12/18/24 77.6 kg (171 lb)   12/13/24 79.4 kg (175 lb)   11/14/24 72.6 kg (160 lb)        Pleasant,  present   ENT/Mouth: membranes moist, no oral lesions or bleeding gums.   Neck brace in place   EYES:  no scleral icterus, normal conjunctivae       Chest/Lungs:   lungs are clear to auscultation, no rales or wheezing, no sternal scar, equal chest wall expansion    Cardiovascular:   Regular. Normal first and second heart sounds with no murmurs, rubs, or gallops; the carotid, radial and posterior tibial pulses are intact, Jugular venous pressure normal, n edema bilaterally    Abdomen:  no tenderness; bowel sounds are present   Extremities: no cyanosis or clubbing   Skin: no xanthelasma, warm.    Neurologic: normal  bilateral, no tremors     Psychiatric: alert and oriented x3, calm        Please refer above for cardiac ROS details.        Medical History  Surgical History Family History Social History   Past Medical History:   Diagnosis Date     Depressive disorder      Gastroesophageal reflux disease      Gout      Hypertension      Sleep apnea      Thyroid disease      Past Surgical History:   Procedure Laterality Date     GENITOURINARY SURGERY       IMPLANT PROSTHESIS PENIS INFLATABLE N/A 12/28/2023    Procedure: PLACEMENT OF INFLATABLE PENILE PROSTHESIS;  Surgeon: Henok Harden MD;  Location: [facility] OR     LAMINECTOMY CERIVCAL POSTERIOR ONE LEVEL N/A 12/13/2024    Procedure: cervical 2 laminectomy and;  Surgeon: Geneva Abel MD;  Location: [facility] OR     OPTICAL TRACKING SYSTEM FUSION POSTERIOR CERVICAL TWO LEVELS Bilateral 12/13/2024    Procedure: right cervical 6-cervical 7 and bilateral cervical 7-thoracic 1 foraminotomies  with cervical 6-thoracic 1 posterior instrumented fusion and arthrodesis with use of stealth.;  Surgeon: Geneva Abel MD;  Location: [facility] OR     No family history on file.     Social History     Socioeconomic History     Marital status:      Spouse name: Not on file     Number of children: Not on file     Years of education: Not on file     Highest education level: Not on file   Occupational History     Not on file   Tobacco Use     Smoking status: Never     Smokeless tobacco: Not on file   Substance and Sexual Activity     Alcohol use: Not Currently     Comment: small amount over [place]     Drug use: Never     Sexual activity: Not on file   Other Topics Concern     Parent/sibling w/ CABG, MI or angioplasty before 65F 55M? Not Asked   Social History Narrative    8/7/2019: Wife is bedside.     Social Drivers of Health     Financial Resource Strain: Low Risk  (10/22/2024)    Received from [facility]    Financial Resource Strain      Difficulty of Paying Living Expenses: 3      Difficulty of Paying Living Expenses: Not on file   Food Insecurity: No Food Insecurity (10/22/2024)    Received from [facility]    Food Insecurity      Do you worry your food will run out before you are able to buy more?: 1   Transportation Needs: No Transportation Needs (10/22/2024)    Received from [facility]    Transportation Needs      Does lack of transportation keep you from medical appointments?: 1      Does lack of transportation keep you from work, meetings or getting things that you need?: 1   Physical Activity: Not on file   Stress: Not on file   Social Connections: Socially Integrated (10/22/2024)    Received from [facility]    Social Connections      Do you often feel lonely or isolated from those around you?: 0   Interpersonal Safety: Unknown (12/15/2024)     Interpersonal Safety      Do you feel physically and emotionally safe where you currently live?: Patient unable to answer      Within the past 12 months, have you been hit, slapped, kicked or otherwise physically hurt by someone?: Patient unable to answer      Within the past 12 months, have you been humiliated or emotionally abused in other ways by your partner or ex-partner?: Patient unable to answer   Housing Stability: Low Risk  (10/22/2024)    Received from [facility] [employer] [facility]    Housing Stability      What is your housing situation today?: 1           Medications  Allergies   Current Outpatient Medications   Medication Sig Dispense Refill     allopurinol (ZYLOPRIM) 100 MG tablet Take 200 mg by mouth daily.       atenolol (TENORMIN) 25 MG tablet Take 25 mg by mouth daily       Cyanocobalamin (VITAMIN B 12) 500 MCG TABS Take 1 tablet by mouth daily.       dexAMETHasone (DECADRON) 4 MG tablet Take 1 tablet (4 mg) by mouth every 12 hours. 4 tablet 0     gabapentin (NEURONTIN) 300 MG capsule Take 600 mg by mouth 2 times daily.       HYDROmorphone (DILAUDID) 2 MG tablet Take 1-2 tablets (2-4 mg) by mouth every 4 hours as needed for moderate pain. 42 tablet 0     levothyroxine (SYNTHROID/LEVOTHROID) 50 MCG tablet Take 1 tablet by mouth daily.       LINZESS 290 MCG capsule Take 290 mcg by mouth every morning       lisinopril (ZESTRIL) 5 MG tablet Take 5 mg by mouth daily       methocarbamol (ROBAXIN) 750 MG tablet Take 1 tablet (750 mg) by mouth every 6 hours. 42 tablet 0     naloxone (NARCAN) 4 MG/0.1ML nasal spray Spray 1 spray (4 mg) into one nostril alternating nostrils as needed for opioid reversal. every 2-3 minutes until assistance arrives 2 each 0     Omega-3 1000 MG capsule Take 1 g by mouth daily.       polyethylene glycol (MIRALAX) 17 g packet Take 1 packet by mouth daily       pramipexole (MIRAPEX) 0.25 MG tablet Take 0.5 mg by mouth 2 times daily       sodium phosphate (FLEET  "ENEMA) 7-19 GM/118ML rectal enema Place 1 enema rectally daily as needed for constipation.       tacrolimus (PROTOPIC) 0.1 % external ointment Apply topically 2 times daily as needed. Apply to eye brows       traMADol (ULTRAM) 50 MG tablet Take 1-2 tablets ( mg) by mouth 3 times daily as needed for pain. 42 tablet 0     triamcinolone (KENALOG) 0.025 % external ointment Apply topically 2 times daily as needed. Apply to lips       Vitamin D3 (VITAMIN D) 10 MCG (400 UNIT) tablet Take 1 tablet by mouth daily.         Allergies   Allergen Reactions     Duloxetine      nightmares       Fluoxetine Nausea     Other reaction(s): GI Upset     Metoprolol Headache     Atenolol ok  Stopped due to persistent headache--tolerates atenolol without problem       Oxycodone      Sertraline Unknown     Paroxetine Other (See Comments)     Other reaction(s): Impotence            Lab Results    Chemistry/lipid CBC Cardiac Enzymes/BNP/TSH/INR   No results for input(s): \"CHOL\", \"HDL\", \"LDL\", \"TRIG\", \"CHOLHDLRATIO\" in the last 97177 hours.  No results for input(s): \"LDL\" in the last 04182 hours.  Recent Labs   Lab Test 12/18/24  0640      POTASSIUM 4.3   CHLORIDE 103   CO2 24   *   BUN 24.3*   CR 1.04   GFRESTIMATED 81   JOHAN 9.1     Recent Labs   Lab Test 12/18/24  0640 12/14/24  0711 07/22/22  2328   CR 1.04 1.01 1.16     Recent Labs   Lab Test 02/28/20  1133   A1C 5.6          Recent Labs   Lab Test 12/18/24  0640   WBC 13.9*   HGB 14.9   HCT 41.9   MCV 90        Recent Labs   Lab Test 12/18/24  0640 07/22/22  2328 04/06/21  0030   HGB 14.9 14.9 15.0    Recent Labs   Lab Test 04/06/21  0030 04/06/18  2237 04/06/18  1805   TROPONINI <0.01 <0.01 <0.01     No results for input(s): \"BNP\", \"NTBNPI\", \"NTBNP\" in the last 67099 hours.  Recent Labs   Lab Test 04/06/18  1805   TSH 3.14     No results for input(s): \"INR\" in the last 46080 hours.     Po Gutierrez, DO  Cardiologist     40 minutes spent by me on the date " of the encounter doing chart review, history and exam, documentation and further activities per the note        The longitudinal plan of care for the diagnosis(es)/condition(s) as documented were addressed during this visit. Due to the added complexity in care, I will continue to support PENG in the subsequent management and with ongoing continuity of care.  Will follow-up on test results                      Thank you for allowing me to participate in the care of your patient.      Sincerely,     Po Gutierrez DO     [facility]  cc:   Dain Davila DO  [address] [place] [id number]

## 2024-12-19 NOTE — PATIENT INSTRUCTIONS
Please contact direct nurses line Monday through Friday 8 AM to 5 PM @ [phone]    After-hours contact cardiology office at [phone].    Plan:   Plan for Zio patch monitor, will be sent to your home  Pharmacologic stress test, to assess heart function and evaluate for blockages in the heart arteries  Will be communicated with results via [employer]

## 2024-12-27 ENCOUNTER — HOSPITAL ENCOUNTER (OUTPATIENT)
Dept: CARDIOLOGY | Facility: HOSPITAL | Age: 64
Discharge: HOME | End: 2024-12-27
Attending: INTERNAL MEDICINE
Payer: COMMERCIAL

## 2024-12-27 ENCOUNTER — HOSPITAL ENCOUNTER (OUTPATIENT)
Dept: NUCLEAR MEDICINE | Facility: HOSPITAL | Age: 64
Discharge: HOME | End: 2024-12-27
Attending: INTERNAL MEDICINE
Payer: COMMERCIAL

## 2024-12-27 ENCOUNTER — MYC MEDICAL ADVICE (OUTPATIENT)
Dept: CARDIOLOGY | Facility: CLINIC | Age: 64
End: 2024-12-27

## 2024-12-27 DIAGNOSIS — R07.89 ATYPICAL CHEST PAIN: ICD-10-CM

## 2024-12-27 DIAGNOSIS — R06.09 EXERTIONAL DYSPNEA: ICD-10-CM

## 2024-12-27 LAB
CV STRESS CURRENT BP HE: NORMAL
CV STRESS CURRENT HR HE: 68
CV STRESS CURRENT HR HE: 71
CV STRESS CURRENT HR HE: 74
CV STRESS CURRENT HR HE: 75
CV STRESS CURRENT HR HE: 77
CV STRESS CURRENT HR HE: 81
CV STRESS CURRENT HR HE: 81
CV STRESS CURRENT HR HE: 82
CV STRESS DEVIATION TIME HE: NORMAL
CV STRESS ECHO PERCENT HR HE: NORMAL
CV STRESS EXERCISE STAGE HE: NORMAL
CV STRESS FINAL RESTING BP HE: NORMAL
CV STRESS FINAL RESTING HR HE: 75
CV STRESS MAX HR HE: 81
CV STRESS MAX TREADMILL GRADE HE: 0
CV STRESS MAX TREADMILL SPEED HE: 0
CV STRESS PEAK DIA BP HE: NORMAL
CV STRESS PEAK SYS BP HE: NORMAL
CV STRESS PHASE HE: NORMAL
CV STRESS PROTOCOL HE: NORMAL
CV STRESS RESTING PT POSITION HE: NORMAL
CV STRESS RESTING PT POSITION HE: NORMAL
CV STRESS ST DEVIATION AMOUNT HE: NORMAL
CV STRESS ST DEVIATION ELEVATION HE: NORMAL
CV STRESS ST EVELATION AMOUNT HE: NORMAL
CV STRESS TEST TYPE HE: NORMAL
CV STRESS TOTAL STAGE TIME MIN 1 HE: NORMAL
RATE PRESSURE PRODUCT: 6804
STRESS ECHO BASELINE DIASTOLIC HE: 78
STRESS ECHO BASELINE HR: 71
STRESS ECHO BASELINE SYSTOLIC BP: 118
STRESS ECHO CALCULATED PERCENT HR: 51 %
STRESS ECHO LAST STRESS DIASTOLIC BP: 57
STRESS ECHO LAST STRESS HR: 75
STRESS ECHO LAST STRESS SYSTOLIC BP: 84
STRESS ECHO TARGET HR: 158

## 2024-12-27 PROCEDURE — 250N000011 HC RX IP 250 OP 636: Performed by: INTERNAL MEDICINE

## 2024-12-27 PROCEDURE — 78452 HT MUSCLE IMAGE SPECT MULT: CPT | Mod: 26 | Performed by: STUDENT IN AN ORGANIZED HEALTH CARE EDUCATION/TRAINING PROGRAM

## 2024-12-27 PROCEDURE — A9500 TC99M SESTAMIBI: HCPCS | Performed by: INTERNAL MEDICINE

## 2024-12-27 PROCEDURE — 343N000001 HC RX 343 MED OP 636: Performed by: INTERNAL MEDICINE

## 2024-12-27 PROCEDURE — 78452 HT MUSCLE IMAGE SPECT MULT: CPT

## 2024-12-27 PROCEDURE — 93017 CV STRESS TEST TRACING ONLY: CPT

## 2024-12-27 PROCEDURE — 93016 CV STRESS TEST SUPVJ ONLY: CPT | Performed by: INTERNAL MEDICINE

## 2024-12-27 PROCEDURE — 93018 CV STRESS TEST I&R ONLY: CPT | Performed by: STUDENT IN AN ORGANIZED HEALTH CARE EDUCATION/TRAINING PROGRAM

## 2024-12-27 RX ORDER — AMINOPHYLLINE 25 MG/ML
50-100 INJECTION, SOLUTION INTRAVENOUS
Status: COMPLETED | OUTPATIENT
Start: 2024-12-27 | End: 2024-12-27

## 2024-12-27 RX ORDER — REGADENOSON 0.08 MG/ML
0.4 INJECTION, SOLUTION INTRAVENOUS ONCE
Status: COMPLETED | OUTPATIENT
Start: 2024-12-27 | End: 2024-12-27

## 2024-12-27 RX ADMIN — REGADENOSON 0.4 MG: 0.08 INJECTION, SOLUTION INTRAVENOUS at 08:28

## 2024-12-27 RX ADMIN — Medication 8.3 MILLICURIE: at 07:03

## 2024-12-27 RX ADMIN — Medication 30.6 MILLICURIE: at 09:01

## 2024-12-27 RX ADMIN — AMINOPHYLLINE 50 MG: 25 INJECTION, SOLUTION INTRAVENOUS at 08:32

## 2024-12-27 NOTE — TELEPHONE ENCOUNTER
Po Gutierrez, DO  [facility] Cv Rn Team Z24 minutes ago (8:34 AM)       Have patient hold on Zio patch monitor.  REdo in 4 weeks after she is off c-collar.

## 2024-12-31 ENCOUNTER — ALLIED HEALTH/NURSE VISIT (OUTPATIENT)
Dept: NEUROSURGERY | Facility: CLINIC | Age: 64
End: 2024-12-31
Payer: COMMERCIAL

## 2024-12-31 VITALS — HEART RATE: 89 BPM | DIASTOLIC BLOOD PRESSURE: 81 MMHG | OXYGEN SATURATION: 97 % | SYSTOLIC BLOOD PRESSURE: 133 MMHG

## 2024-12-31 DIAGNOSIS — Z98.1 S/P CERVICAL SPINAL FUSION: Primary | ICD-10-CM

## 2024-12-31 PROCEDURE — 99207 PR NO CHARGE NURSE ONLY: CPT

## 2024-12-31 NOTE — PROGRESS NOTES
POSTOPERATIVE FOLLOW-UP NURSE VISIT NOTE    Procedure: cervical 2 laminectomy and right cervical 6-cervical 7 and bilateral cervical 7-thoracic 1 foraminotomies with cervical 6-thoracic 1 posterior instrumented fusion and arthrodesis with use of stealth.     Date: 12/13/24    Surgeon: Colten    Pre-op symptoms: chronic numbness and fine motor difficulties and acute right arm and hand pain and numbness, weakness, neck pain, worsening imbalance     Post-op symptoms: numbness is about the same, had 5 hour bout of intense pain in left forearm, fine motor difficulty is slightly worse compared to before surgery, pain in hands is about the same, balance issues are about the same, pain going up arms has improved    Pain and medications: 0/10 currently  Tramadol  mg Q8 PRN: taking 100 mg in AM, occasionally 50 mg in afternoon, 100 mg HS  Methocarbamol 750 mg Q6 PRN: taking as ordered    Bracing compliance: compliant    Medications refilled: none    Imaging ordered: XR cervical    Wound:  CDI,  approximated, no drainage, mild erythema, minimal temperature change compared to surrounding tissue, mildly tender. Cleansed with iodine, sutures removed, cleansed again with iodine.    Torsten Vanegasg presents to the clinic today for removal of sutures.  The patient has had the sutures in place for 18 days.  There has been no history of infection or drainage.  Single continuous suture seen located on the surgical site midline posterior cervical spine.  The wound is healing well with no signs of infection.  Tetanus last injection 6/25/22.   All sutures were easily removed today.  Routine wound care discussed.  The patient will follow up as needed.

## 2025-01-02 LAB — CV ZIO PRELIM RESULTS: NORMAL

## 2025-01-27 ENCOUNTER — HOSPITAL ENCOUNTER (OUTPATIENT)
Dept: RADIOLOGY | Facility: HOSPITAL | Age: 65
Discharge: HOME | End: 2025-01-27
Attending: SURGERY | Admitting: SURGERY
Payer: MEDICARE

## 2025-01-27 DIAGNOSIS — Z98.1 S/P CERVICAL SPINAL FUSION: ICD-10-CM

## 2025-01-27 PROCEDURE — 72040 X-RAY EXAM NECK SPINE 2-3 VW: CPT

## 2025-01-27 PROCEDURE — T1013 SIGN LANG/ORAL INTERPRETER: HCPCS | Mod: U3

## 2025-01-28 ENCOUNTER — OFFICE VISIT (OUTPATIENT)
Dept: NEUROSURGERY | Facility: CLINIC | Age: 65
End: 2025-01-28
Payer: MEDICARE

## 2025-01-28 ENCOUNTER — LAB (OUTPATIENT)
Dept: LAB | Facility: HOSPITAL | Age: 65
End: 2025-01-28
Payer: MEDICARE

## 2025-01-28 VITALS
SYSTOLIC BLOOD PRESSURE: 145 MMHG | BODY MASS INDEX: 32.39 KG/M2 | HEART RATE: 60 BPM | WEIGHT: 165 LBS | DIASTOLIC BLOOD PRESSURE: 87 MMHG | HEIGHT: 60 IN | OXYGEN SATURATION: 94 %

## 2025-01-28 DIAGNOSIS — Z98.1 S/P CERVICAL SPINAL FUSION: Primary | ICD-10-CM

## 2025-01-28 DIAGNOSIS — Z98.1 S/P CERVICAL SPINAL FUSION: ICD-10-CM

## 2025-01-28 LAB
BASOPHILS # BLD AUTO: 0 10E3/UL (ref 0–0.2)
BASOPHILS NFR BLD AUTO: 1 %
CRP SERPL-MCNC: <3 MG/L
EOSINOPHIL # BLD AUTO: 0.2 10E3/UL (ref 0–0.7)
EOSINOPHIL NFR BLD AUTO: 3 %
ERYTHROCYTE [DISTWIDTH] IN BLOOD BY AUTOMATED COUNT: 13.2 % (ref 10–15)
HCT VFR BLD AUTO: 46.9 % (ref 40–53)
HGB BLD-MCNC: 16.1 G/DL (ref 13.3–17.7)
IMM GRANULOCYTES # BLD: 0 10E3/UL
IMM GRANULOCYTES NFR BLD: 0 %
LYMPHOCYTES # BLD AUTO: 3.2 10E3/UL (ref 0.8–5.3)
LYMPHOCYTES NFR BLD AUTO: 42 %
MCH RBC QN AUTO: 31.7 PG (ref 26.5–33)
MCHC RBC AUTO-ENTMCNC: 34.3 G/DL (ref 31.5–36.5)
MCV RBC AUTO: 92 FL (ref 78–100)
MONOCYTES # BLD AUTO: 0.6 10E3/UL (ref 0–1.3)
MONOCYTES NFR BLD AUTO: 8 %
NEUTROPHILS # BLD AUTO: 3.6 10E3/UL (ref 1.6–8.3)
NEUTROPHILS NFR BLD AUTO: 47 %
NRBC # BLD AUTO: 0 10E3/UL
NRBC BLD AUTO-RTO: 0 /100
PLATELET # BLD AUTO: 231 10E3/UL (ref 150–450)
PROCALCITONIN SERPL IA-MCNC: 0.06 NG/ML
RBC # BLD AUTO: 5.08 10E6/UL (ref 4.4–5.9)
WBC # BLD AUTO: 7.8 10E3/UL (ref 4–11)

## 2025-01-28 PROCEDURE — 85025 COMPLETE CBC W/AUTO DIFF WBC: CPT

## 2025-01-28 PROCEDURE — 86140 C-REACTIVE PROTEIN: CPT

## 2025-01-28 PROCEDURE — 99024 POSTOP FOLLOW-UP VISIT: CPT

## 2025-01-28 PROCEDURE — 84145 PROCALCITONIN (PCT): CPT

## 2025-01-28 PROCEDURE — 36415 COLL VENOUS BLD VENIPUNCTURE: CPT

## 2025-01-28 RX ORDER — CEPHALEXIN 500 MG/1
500 CAPSULE ORAL 4 TIMES DAILY
Qty: 40 CAPSULE | Refills: 0 | Status: SHIPPED | OUTPATIENT
Start: 2025-01-28 | End: 2025-02-07

## 2025-01-28 RX ORDER — FAMOTIDINE 20 MG/1
20 TABLET, FILM COATED ORAL 2 TIMES DAILY
Qty: 20 TABLET | Refills: 0 | Status: SHIPPED | OUTPATIENT
Start: 2025-01-28 | End: 2025-02-07

## 2025-01-28 ASSESSMENT — PAIN SCALES - GENERAL: PAINLEVEL_OUTOF10: MILD PAIN (3)

## 2025-01-28 NOTE — NURSING NOTE
Torsten La is a 64 year old male who presents for:  Chief Complaint   Patient presents with    Surgical Followup     6 week post-op for cervical laminectomy. Patient states discharge over night, blood on pillow. Pain level about 2-3 today, constant mild numbness and new weakness in hands, hard to  fine objects such as medication/pills.        Initial Vitals:  BP (!) 145/87   Pulse 60   Ht 5' (1.524 m)   Wt 165 lb (74.8 kg)   SpO2 94%   BMI 32.22 kg/m   Estimated body mass index is 32.22 kg/m  as calculated from the following:    Height as of this encounter: 5' (1.524 m).    Weight as of this encounter: 165 lb (74.8 kg). Body surface area is 1.78 meters squared. BP completed using cuff size: regular  Mild Pain (3)        Celestino Louie

## 2025-01-28 NOTE — PROGRESS NOTES
NEUROSURGERY CLINIC FOLLOW UP   Torsten La is a pleasant 64 year old male who presents to the clinic today for a follow-up evaluation on C2 cervical laminectomy,( had previous C3-6 posterior laminectomy) with C6 to T1 fusion on 12/13/2024 by Dr Abel. Since the surgery he presented to the ED twice with left sided chest pain on 12/18/2024 and 12/30/2024. 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     Preoperative his symptoms were numbness on the right side with right hand pain, numbness and worsening imbalance.   Presently patient states that he has noticed much of a difference in sensation yet.    He is also concerned that he wakes up with blood spots on his pillow and worried about the color of his wound.          PHYSICAL EXAM:   BP (!) 145/87   Pulse 60   Ht 1.524 m (5')   Wt 74.8 kg (165 lb)   SpO2 94%   BMI 32.22 kg/m         Exam:   Patient appears comfortable, conversational, and in no apparent distress.     CN II-XII grossly intact, alert and appropriate with conversation and following commands.   Gait is non-antalgic.   Cervical spine is not tender to palpation. Appropriate range of motion of neck  Baseline decreased sensation in bilateral extremities.    UE muscle strength  Right  Left    Deltoid  5/5  5/5    Biceps  5/5  4/5    Triceps  5/5  5/5    Hand intrinsics  5/5  5/5    Hand grasp  5/5  5/5    Westfall signs  neg  neg      LE muscle strength  Right  Left    Iliopsoas (hip flexion)  5/5  5/5    Quad (knee extension)  5/5  5/5    Hamstring (knee flexion)  5/5  5/5    Gastrocnemius (PF)  5/5  5/5    Tibialis Ant. (DF)  5/5  5/5    EHL  5/5  5/5        Incision: well healed without erythema, induration    No noticeable drainage at this time.     IMAGING:   I personally reviewed all radiographic images           Radiographic Findings: Full radiological report in chart. Imaging was reviewed with with patient today.     Radiographic Impression:   EXAM: XR CERVICAL  SPINE 2/3 VIEWS  LOCATION: [facility]  DATE: 1/27/2025     INDICATION: Status post cervical spinal fusion.  COMPARISON: 12/15/2024 cervical spine plain film.                                                                      IMPRESSION: Cervical spine visualized from skull base through C6. C7 obscured by shoulder. Posterior spinal instrumented fusion of C6-T1 without evident hardware complication. No finding for hardware loosening. Multilevel decompressive laminectomy in the   cervical spine. Straightening of the usual cervical lordosis. Multilevel mild degenerative disc disease is unchanged. Multilevel mild to moderate facet arthropathy. Multilevel bridging anterior marginal osteophytes redemonstrated. Mild to moderate   atlantodental degenerative change. No prevertebral soft tissue swelling. Diffuse ossification of the posterior longitudinal ligament redemonstrated. Previously present surgical drain has been removed.          ASSESSMENT AND PLAN:      Torsten La is a 64 year old male who presents to the clinic for a follow-up on C2 cervical laminectomy,( had previous C3-6 posterior laminectomy) with C6 to T1 fusion on 12/13/2024 by Dr Abel.     The patient's most recent imaging was reviewed with him today. It was explained that images show a interval healing C6 to T1 posterior fusion, which correlates to today's clinical examination. He is at relative baseline with sensation per patient.    Patient has been advised to wean from their brace by removing the brace for 1-2 hours a day, increasing each day by 1-2 hour increments.  If at any time during the wean you experience excessive fatigue, back pain or spasm, back down to the previous level for a day or so, then resume schedule.  Once out of brace for a full 8 hours, discontinue altogether or wear only as needed for comfort.      He can gradually increase his activity and add 5 pounds per week until back to his normal weight  restrictions.     He has been provided referral to physical therapy to focus on cervical mobility and strength.    Today he will obtain labs to rule out an infection and be placed on Keflex as a prophylaxis to prevent an infection along with Pepcid for indigestion due to antibiotic.      Will plan to follow up in 6 weeks with repeat lumbar xray for a 3 months postop appointment with Dr. Abel. He understands that should any symptoms arise to contact the office sooner.        Benji Tim, HAL, APRN, CNP  [facility]surgery  Tel [phone]

## 2025-01-28 NOTE — LETTER
1/28/2025      Torsten La  [address]  [facility] [address]      Dear Colleague,    Thank you for referring your patient, Torsten La, to the [facility] SPINE [facility] NEUROS[facility]GERY. Please see a copy of my visit note below.    NEUROSURGERY CLINIC FOLLOW UP   Torsten La is a pleasant 64 year old male who presents to the clinic today for a follow-up evaluation on C2 cervical laminectomy,( had previous C3-6 posterior laminectomy) with C6 to T1 fusion on 12/13/2024 by Dr Abel. Since the surgery he presented to the ED twice with left sided chest pain on 12/18/2024 and 12/30/2024. 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     Preoperative his symptoms were numbness on the right side with right hand pain, numbness and worsening imbalance.   Presently patient states that he has noticed much of a difference in sensation yet.    He is also concerned that he wakes up with blood spots on his pillow and worried about the color of his wound.          PHYSICAL EXAM:   BP (!) 145/87   Pulse 60   Ht 1.524 m (5')   Wt 74.8 kg (165 lb)   SpO2 94%   BMI 32.22 kg/m         Exam:   Patient appears comfortable, conversational, and in no apparent distress.     CN II-XII grossly intact, alert and appropriate with conversation and following commands.   Gait is non-antalgic.   Cervical spine is not tender to palpation. Appropriate range of motion of neck  Baseline decreased sensation in bilateral extremities.    UE muscle strength  Right  Left    Deltoid  5/5  5/5    Biceps  5/5  4/5    Triceps  5/5  5/5    Hand intrinsics  5/5  5/5    Hand grasp  5/5  5/5    Westfall signs  neg  neg      LE muscle strength  Right  Left    Iliopsoas (hip flexion)  5/5  5/5    Quad (knee extension)  5/5  5/5    Hamstring (knee flexion)  5/5  5/5    Gastrocnemius (PF)  5/5  5/5    Tibialis Ant. (DF)  5/5  5/5    EHL  5/5  5/5        Incision: well healed without erythema, induration    No noticeable drainage at this  time.     IMAGING:   I personally reviewed all radiographic images           Radiographic Findings: Full radiological report in chart. Imaging was reviewed with with patient today.     Radiographic Impression:   EXAM: XR CERVICAL SPINE 2/3 VIEWS  LOCATION: [facility]  DATE: 1/27/2025     INDICATION: Status post cervical spinal fusion.  COMPARISON: 12/15/2024 cervical spine plain film.                                                                      IMPRESSION: Cervical spine visualized from skull base through C6. C7 obscured by shoulder. Posterior spinal instrumented fusion of C6-T1 without evident hardware complication. No finding for hardware loosening. Multilevel decompressive laminectomy in the   cervical spine. Straightening of the usual cervical lordosis. Multilevel mild degenerative disc disease is unchanged. Multilevel mild to moderate facet arthropathy. Multilevel bridging anterior marginal osteophytes redemonstrated. Mild to moderate   atlantodental degenerative change. No prevertebral soft tissue swelling. Diffuse ossification of the posterior longitudinal ligament redemonstrated. Previously present surgical drain has been removed.          ASSESSMENT AND PLAN:      Torsten La is a 64 year old male who presents to the clinic for a follow-up on C2 cervical laminectomy,( had previous C3-6 posterior laminectomy) with C6 to T1 fusion on 12/13/2024 by Dr Abel.     The patient's most recent imaging was reviewed with him today. It was explained that images show a interval healing C6 to T1 posterior fusion, which correlates to today's clinical examination. He is at relative baseline with sensation per patient.    Patient has been advised to wean from their brace by removing the brace for 1-2 hours a day, increasing each day by 1-2 hour increments.  If at any time during the wean you experience excessive fatigue, back pain or spasm, back down to the previous level for a day or so,  then resume schedule.  Once out of brace for a full 8 hours, discontinue altogether or wear only as needed for comfort.      He can gradually increase his activity and add 5 pounds per week until back to his normal weight restrictions.     He has been provided referral to physical therapy to focus on cervical mobility and strength.    Today he will obtain labs to rule out an infection and be placed on Keflex as a prophylaxis to prevent an infection along with Pepcid for indigestion due to antibiotic.      Will plan to follow up in 6 weeks with repeat lumbar xray for a 3 months postop appointment with Dr. Abel. He understands that should any symptoms arise to contact the office sooner.        Benji Tim DNP, APRN, CNP  [facility] Neurosurgery  Tel [phone]          Again, thank you for allowing me to participate in the care of your patient.        Sincerely,        HOMERO Kenney CNP    Electronically signed

## 2025-02-20 ENCOUNTER — THERAPY VISIT (OUTPATIENT)
Dept: PHYSICAL THERAPY | Facility: REHABILITATION | Age: 65
End: 2025-02-20
Payer: COMMERCIAL

## 2025-02-20 DIAGNOSIS — Z98.1 S/P CERVICAL SPINAL FUSION: Primary | ICD-10-CM

## 2025-02-20 DIAGNOSIS — M54.12 CERVICAL RADICULOPATHY: ICD-10-CM

## 2025-02-25 ENCOUNTER — ALLIED HEALTH/NURSE VISIT (OUTPATIENT)
Dept: NEUROSURGERY | Facility: CLINIC | Age: 65
End: 2025-02-25
Payer: COMMERCIAL

## 2025-02-25 VITALS
HEART RATE: 105 BPM | TEMPERATURE: 98.5 F | SYSTOLIC BLOOD PRESSURE: 137 MMHG | OXYGEN SATURATION: 94 % | DIASTOLIC BLOOD PRESSURE: 93 MMHG

## 2025-02-25 DIAGNOSIS — Z98.1 S/P CERVICAL SPINAL FUSION: Primary | ICD-10-CM

## 2025-02-25 PROCEDURE — 99207 PR NO CHARGE NURSE ONLY: CPT

## 2025-02-25 NOTE — PROGRESS NOTES
POSTOPERATIVE FOLLOW-UP NURSE VISIT NOTE    Procedure: cervical 2 laminectomy and right cervical 6-cervical 7 and bilateral cervical 7-thoracic 1 foraminotomies with cervical 6-thoracic 1 posterior instrumented fusion and arthrodesis with use of stealth.     Date: 12/13/24    Surgeon: Dr. Abel    Wound: CDI, approximated, no drainage, minimal erythema, no temperature change compared to surrounding tissue, mildly tender. Assessed by Miriam, cleansed with ChloraPrep. Wound appears to be healing well, no current signs of infection. Advised patient to monitor healing and return for scheduled follow-up 3/20/25. Soft collar provided.

## 2025-03-18 ENCOUNTER — HOSPITAL ENCOUNTER (OUTPATIENT)
Dept: RADIOLOGY | Facility: HOSPITAL | Age: 65
Discharge: HOME | End: 2025-03-18
Payer: COMMERCIAL

## 2025-03-18 DIAGNOSIS — Z98.1 S/P CERVICAL SPINAL FUSION: ICD-10-CM

## 2025-03-18 PROCEDURE — 72040 X-RAY EXAM NECK SPINE 2-3 VW: CPT

## 2025-03-20 ENCOUNTER — OFFICE VISIT (OUTPATIENT)
Dept: NEUROSURGERY | Facility: CLINIC | Age: 65
End: 2025-03-20
Payer: COMMERCIAL

## 2025-03-20 VITALS
HEART RATE: 60 BPM | DIASTOLIC BLOOD PRESSURE: 89 MMHG | SYSTOLIC BLOOD PRESSURE: 140 MMHG | OXYGEN SATURATION: 98 % | WEIGHT: 167.1 LBS | HEIGHT: 60 IN | BODY MASS INDEX: 32.81 KG/M2

## 2025-03-20 DIAGNOSIS — Z98.1 S/P CERVICAL SPINAL FUSION: Primary | ICD-10-CM

## 2025-03-20 ASSESSMENT — PAIN SCALES - GENERAL: PAINLEVEL_OUTOF10: SEVERE PAIN (7)

## 2025-03-20 NOTE — PROGRESS NOTES
NEUROSURGERY FOLLOW UP  NOTE    Torsten La comes today in follow-up.  Patient is a 64-year-old male who is status post C2 cervical laminectomy,( had previous C3-6 posterior laminectomy) with C6 to T1 fusion on 12/13/2024     Preoperative his symptoms were numbness on the right side with right hand pain, numbness and worsening imbalance.   Continues in physical therapy. Does not notice significant improvement yet in symptoms. 3 sessions of physical therapy    PHYSICAL EXAM:   Constitutional: BP (!) 140/89   Pulse 60   Ht 1.524 m (5')   Wt 75.8 kg (167 lb 1.6 oz)   SpO2 98%   BMI 32.63 kg/m       Mental Status: A & O in no acute distress.  Affect is appropriate.  Speech is fluent.  Recent and remote memory are intact.  Attention span and concentration are normal.     Motor:  Normal bulk and tone all muscle groups of upper and lower extremities. 4+/5 biep and interossei otherwise 5/5 in UE    Sensory: Sensation intact bilaterally to light touch decreased diffusely in his left palm.      Incision CDI. Some thickening over left upper incision     IMAGING:   I personally reviewed all radiographic images        CONSULTATION ASSESSMENT AND PLAN:    We looked at his postop imaging which shows no hardware malfunction.  Recommend continue therapy. Continue to avoid high impact activity or lifting repetitively more then 50 pounds. Follow up in 3 months with a new cervical xray for 6 month post operative visit. Vitamin E to wound.     Geneva Abel MD      CC:     Meg Tinajero  [address] [place] [address] [address] [facility] [[email or web address]] [address]  [facility] [address]

## 2025-03-20 NOTE — LETTER
3/20/2025      Torsten La  [address]  [facility] [address]      Dear Colleague,    Thank you for referring your patient, Torsten La, to the [facility] SPINE AND NEUROSURGERY. Please see a copy of my visit note below.    NEUROSURGERY FOLLOW UP  NOTE    Torsten La comes today in follow-up.  Patient is a 64-year-old male who is status post C2 cervical laminectomy,( had previous C3-6 posterior laminectomy) with C6 to T1 fusion on 12/13/2024     Preoperative his symptoms were numbness on the right side with right hand pain, numbness and worsening imbalance.   Continues in physical therapy. Does not notice significant improvement yet in symptoms. 3 sessions of physical therapy    PHYSICAL EXAM:   Constitutional: BP (!) 140/89   Pulse 60   Ht 1.524 m (5')   Wt 75.8 kg (167 lb 1.6 oz)   SpO2 98%   BMI 32.63 kg/m       Mental Status: A & O in no acute distress.  Affect is appropriate.  Speech is fluent.  Recent and remote memory are intact.  Attention span and concentration are normal.     Motor:  Normal bulk and tone all muscle groups of upper and lower extremities. 4+/5 biep and interossei otherwise 5/5 in UE    Sensory: Sensation intact bilaterally to light touch decreased diffusely in his left palm.      Incision CDI. Some thickening over left upper incision     IMAGING:   I personally reviewed all radiographic images        CONSULTATION ASSESSMENT AND PLAN:    We looked at his postop imaging which shows no hardware malfunction.  Recommend continue therapy. Continue to avoid high impact activity or lifting repetitively more then 50 pounds. Follow up in 3 months with a new cervical xray for 6 month post operative visit. Vitamin E to wound.     Geneva Abel MD      CC:     Meg Tinajero  [address]  [facility] [address]      Again, thank you for allowing me to participate in the care of your patient.        Sincerely,        Geneva Abel MD    Electronically signed

## 2025-03-20 NOTE — PATIENT INSTRUCTIONS
Recommend continue physical therapy. Continue to avoid high impact activity or lifting repetitively more then 50 pounds. Follow up in 3 months with a new cervical xray for 6 month post operative visit.     Vitamin E to wound.

## 2025-03-20 NOTE — NURSING NOTE
Torsten La is a 64 year old male who presents for:  Chief Complaint   Patient presents with    RECHECK     Patient little numbness in the palm of the hands. Drops things frequently. Lvl 7.        Initial Vitals:  BP (!) 140/89   Pulse 60   Ht 5' (1.524 m)   Wt 167 lb 1.6 oz (75.8 kg)   SpO2 98%   BMI 32.63 kg/m   Estimated body mass index is 32.63 kg/m  as calculated from the following:    Height as of this encounter: 5' (1.524 m).    Weight as of this encounter: 167 lb 1.6 oz (75.8 kg).. Body surface area is 1.79 meters squared. BP completed using cuff size: regular  Severe Pain (7)    Nursing Comments:       Nadege Negron

## 2025-04-24 ENCOUNTER — ALLIED HEALTH/NURSE VISIT (OUTPATIENT)
Dept: NEUROSURGERY | Facility: CLINIC | Age: 65
End: 2025-04-24
Payer: COMMERCIAL

## 2025-04-24 DIAGNOSIS — Z98.1 S/P CERVICAL SPINAL FUSION: Primary | ICD-10-CM

## 2025-05-01 ENCOUNTER — TELEPHONE (OUTPATIENT)
Dept: VASCULAR SURGERY | Facility: CLINIC | Age: 65
End: 2025-05-01
Payer: COMMERCIAL

## 2025-05-01 NOTE — TELEPHONE ENCOUNTER
Vascular Referral Intake    Appointment note (to be pasted into appt note. Also add where additional info is located ie: outside images pushed to PACS, in Epic, sent to HIM, etc): CONSULT, Geneva Abel MD referring.  Non-healing cervical spine incision, wound is on superior part of incision.       Referred by Geneva Abel MD  for Z98.1 (ICD-10-CM) - S/P cervical spinal fusion     Specialty: Wound Clinic    Specific Provider if Necessary:  Cinda Villatoro NP, Danette Hendrickson NP, or Dr. Jerry Ambriz    Visit Type: New    Time Frame: Next Available    Testing/Imaging Needed Before Consult: None    Yolanda or bed needed: No    *Schedulers: Please send welcome letter to patient after appointment(s) scheduled*

## 2025-05-01 NOTE — TELEPHONE ENCOUNTER
Caller: Torsten (with )    Provider: none, wound referral in place    Detailed reason for call: Patient calling to schedule wound consult for non healing surgical wound on his neck.  Please review and advise for scheduling.  Referral was placed 4/24/25 but sent to neuro surgery.  Patient asks that we message him via [employer] to scheduling as this is the easiest.  Okay to call if this will not work for us.     Best phone number to contact: [phone]    Best time to contact: any    Ok to leave a detailed message: Yes    Ok to speak to authorized person if needed: No      (Noted to patient if reason is related to wound or incision, to please send a photo via email or [employer].)

## 2025-05-09 PROBLEM — M75.42 IMPINGEMENT SYNDROME OF LEFT SHOULDER: Status: ACTIVE | Noted: 2018-11-08

## 2025-05-09 PROBLEM — F52.32 MALE ORGASMIC DISORDER: Status: ACTIVE | Noted: 2022-11-20

## 2025-05-09 PROBLEM — R29.898 WEAKNESS OF RIGHT HIP: Status: ACTIVE | Noted: 2018-11-29

## 2025-05-09 PROBLEM — H16.223 KERATITIS SICCA, BOTH EYES: Status: ACTIVE | Noted: 2024-09-04

## 2025-05-09 PROBLEM — M16.11 PRIMARY OSTEOARTHRITIS OF RIGHT HIP: Status: ACTIVE | Noted: 2018-11-29

## 2025-05-09 PROBLEM — M79.2: Status: ACTIVE | Noted: 2018-11-15

## 2025-05-09 PROBLEM — F52.32 ANORGASMIA OF MALE: Status: ACTIVE | Noted: 2022-11-20

## 2025-05-09 PROBLEM — R68.2 DRY MOUTH: Status: ACTIVE | Noted: 2024-09-14

## 2025-05-09 PROBLEM — M70.61 GREATER TROCHANTERIC BURSITIS, RIGHT: Status: ACTIVE | Noted: 2018-11-29

## 2025-05-09 PROBLEM — M48.07 LUMBOSACRAL STENOSIS: Status: ACTIVE | Noted: 2019-07-19

## 2025-05-09 PROBLEM — N52.9 PRIMARY ERECTILE DYSFUNCTION: Status: ACTIVE | Noted: 2022-11-20

## 2025-05-09 PROBLEM — M54.16 LUMBAR RADICULOPATHY, RIGHT: Status: ACTIVE | Noted: 2019-02-28

## 2025-05-09 PROBLEM — M75.101 TEAR OF RIGHT ROTATOR CUFF: Status: ACTIVE | Noted: 2018-11-08

## 2025-05-09 PROBLEM — M19.012 OSTEOARTHRITIS OF GLENOHUMERAL JOINT, LEFT: Status: ACTIVE | Noted: 2018-11-08

## 2025-05-09 PROBLEM — N28.1 RENAL CYST: Status: ACTIVE | Noted: 2017-12-07

## 2025-05-09 PROBLEM — G95.9 CERVICAL MYELOPATHY (H): Status: ACTIVE | Noted: 2018-07-18

## 2025-05-09 PROBLEM — E66.811 OBESITY, CLASS I, BMI 30-34.9: Status: ACTIVE | Noted: 2023-12-03

## 2025-05-09 PROBLEM — Z86.0100 HISTORY OF COLON POLYPS: Status: ACTIVE | Noted: 2022-12-15

## 2025-05-09 PROBLEM — M48.8X2 OSSIFICATION OF POSTERIOR LONGITUDINAL LIGAMENT IN CERVICAL REGION (H): Status: ACTIVE | Noted: 2018-07-03

## 2025-05-09 PROBLEM — M47.12 CERVICAL SPONDYLOSIS WITH MYELOPATHY: Status: ACTIVE | Noted: 2024-11-25

## 2025-05-09 PROBLEM — R20.0 NUMBNESS: Status: ACTIVE | Noted: 2018-11-29

## 2025-05-09 RX ORDER — METHYLPREDNISOLONE 4 MG/1
TABLET ORAL
COMMUNITY
Start: 2024-10-23

## 2025-05-09 RX ORDER — OXYCODONE AND ACETAMINOPHEN 5; 325 MG/1; MG/1
TABLET ORAL
COMMUNITY

## 2025-05-09 RX ORDER — SILDENAFIL 100 MG/1
1 TABLET, FILM COATED ORAL
COMMUNITY
Start: 2024-08-28

## 2025-05-09 RX ORDER — CLOBETASOL PROPIONATE 0.5 MG/G
OINTMENT TOPICAL
COMMUNITY

## 2025-05-09 RX ORDER — METHOCARBAMOL 500 MG/1
TABLET, FILM COATED ORAL
COMMUNITY
Start: 2025-02-22

## 2025-05-09 RX ORDER — HYDROCODONE BITARTRATE AND ACETAMINOPHEN 5; 325 MG/1; MG/1
TABLET ORAL
COMMUNITY

## 2025-05-14 ENCOUNTER — OFFICE VISIT (OUTPATIENT)
Dept: VASCULAR SURGERY | Facility: CLINIC | Age: 65
End: 2025-05-14
Attending: NURSE PRACTITIONER
Payer: COMMERCIAL

## 2025-05-14 VITALS — SYSTOLIC BLOOD PRESSURE: 134 MMHG | HEART RATE: 61 BPM | DIASTOLIC BLOOD PRESSURE: 83 MMHG | OXYGEN SATURATION: 96 %

## 2025-05-14 DIAGNOSIS — E66.811 OBESITY, CLASS I, BMI 30-34.9: ICD-10-CM

## 2025-05-14 DIAGNOSIS — Z98.1 S/P CERVICAL SPINAL FUSION: Primary | ICD-10-CM

## 2025-05-14 DIAGNOSIS — R73.03 PREDIABETES: ICD-10-CM

## 2025-05-14 DIAGNOSIS — T81.329A DEHISCENCE OF INTERNAL SURGICAL WOUND, INITIAL ENCOUNTER: ICD-10-CM

## 2025-05-14 PROCEDURE — 11042 DBRDMT SUBQ TIS 1ST 20SQCM/<: CPT | Performed by: NURSE PRACTITIONER

## 2025-05-14 PROCEDURE — G0463 HOSPITAL OUTPT CLINIC VISIT: HCPCS

## 2025-05-14 RX ORDER — MUPIROCIN 20 MG/G
OINTMENT TOPICAL 3 TIMES DAILY
Qty: 30 G | Refills: 3 | Status: SHIPPED | OUTPATIENT
Start: 2025-05-14

## 2025-05-14 RX ORDER — FERROUS SULFATE 325(65) MG
325 TABLET ORAL
COMMUNITY
Start: 2025-05-14

## 2025-05-14 RX ORDER — CHLORHEXIDINE GLUCONATE 40 MG/ML
SOLUTION TOPICAL DAILY
Qty: 118 ML | Refills: 2 | Status: SHIPPED | OUTPATIENT
Start: 2025-05-14

## 2025-05-14 ASSESSMENT — PAIN SCALES - GENERAL: PAINLEVEL_OUTOF10: MODERATE PAIN (5)

## 2025-05-14 NOTE — PATIENT INSTRUCTIONS
Daily in the shower  After washing your hair with your regular shampoo  Use a little bit of hibiclens (chlorahexadine) over the head/neck incision  Gently cleanse do no scrub  Pat dry  Apply pea sized amount of Mupirocin ointment to the head/incision area  Apply this 3 times per day  We do not want this to dry out and form another scab

## 2025-05-14 NOTE — PROGRESS NOTES
[facility] Consult Note    Date of Service: May 14, 2025     Requesting Provider: Dr. Geneva Abel    Chief Complaint: dehisced surgical incision of the neck    History of Present Illness: Torsten La is being seen at [facility] today regarding dehisced surgical incision of the neck. They arrive to the clinic today with  and wife Angelica The patient reports that he had neck surgery 12/2024; he first started to notice issues with the incision 2 months later; there was some bleeding from the proximal most portion of the incision. He felt that he would fall asleep on the area and it would open then heal; this has happened 4 times now. He has spoken to his surgeon about this and then was referred to us. Was currently/previously using vitamin e ointment. Not using any special shampoo.  Reports pain of 0/10 unless touched; currently using nothing for pain. Has used nothing as compression in the past, is currently using nothing for compression. Denies any fevers, chills, or generalized ill feeling. Denies history of cancer. Sleeps in a bed/recliner with legs elevated he sleeps on his back; he cannot sleep on his sides due to shoulder pain; he cannot sleep on his stomach due to issues breathing. He has ordered a gel/foam tempurpedic type pillow that supports his head.  I personally reviewed outside imaging, lab work, and progress noted through Care Everywhere and outside records.      Review of Systems:   Constitutional:  Negative   EENTM: negative for glasses;  positive Nikolai; deaf  GI:  negative for nausea/vomiting;   negative for constipation   negative diarrhea;   negative for fecal incontinence  negative weight loss  :   negative dysuria,  negative incontinence  MS: patient is ambulatory;  does not use assistive devices  Cardiac: negative   Respiratory:  negative SOB  Endocrine:  positive pre- diabetes  Psych: negative  depression/anxiety    Past Medical History:    Past Medical History:   Diagnosis Date    Adenomatous polyp of colon 09/28/2010    Colonoscopy 7/10, advised next colonoscopy in 4 years.  Colonoscopy 2/2015--normal, 5 year follow-up   Colonoscopy 2/2021 - 5 year follow-up      Adjustment disorder with depressed mood 03/14/2019 12/13/2021     8:00 AM 11/8/2022    11:00 AM 10/10/2023     7:00 AM   PHQ Depression Screening   Date of PHQ exam (doc flow) 12/13/2021 11/8/2022 10/10/2023   1. Lack of interest/pleasure 0 - Not at all 0 - Not at all 0 - Not at all   2. Feeling down/depressed 1 - Several days 0 - Not at all 1 - Several days   PHQ-2 TOTAL SCORE 1 0 1   3. 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    Cervical myelopathy (H) 07/18/2018    Cervical radiculopathy 11/25/2024    Cervical spondylosis with myelopathy 11/25/2024    Chronic constipation 12/03/2023    Chronic midline low back pain with right-sided sciatica 09/09/2019    CKD (chronic kidney disease) stage 3, GFR 30-59 ml/min (H) 12/15/2022    CREATININE (mg/dL)   Date Value   09/04/2024 1.64 (H)   12/15/2022 1.19      Conductive hearing loss 12/13/2024    Deaf since age 17      Depression, unspecified depression type 08/07/2019    Depressive disorder     Disseminated idiopathic skeletal hyperostosis 04/12/2022    Dry mouth 09/14/2024    Fatty liver 12/15/2022    10/2020 - ultrasound - fatty liver  2/2021 CT without contrast - normal liver      Gastroesophageal reflux disease     GERD (gastroesophageal reflux disease) 01/25/2012    Gout     Greater trochanteric bursitis, right 11/29/2018    Hepatitis B, chronic (H) 07/14/2010    Hep BE antibody positive, hep BE antigen negative with extremely low viral level (detectable but too low to be counted).  No treatment indicated, Should be followed with LFTs every 6 months, BE antigen every 6-12 months and AFP and US every 6-12 months and viral level yearly.  Follow up with GI yearly.  1/31/23   [facility] - I will check a hepatitis-B viral DNA. If this is negative, I think     History of colon polyps 12/15/2022    2/2021 colonoscopy - polyp, recheck 5 years   11/21/24 \"Let[employer]GetChecked\" at-home FIT test provided by health plan, result negative.  Still needs colonoscopy in 2/2026.      Hypertension     Hypothyroidism (acquired) 12/18/2022    Lab Results   Component Value Date/Time    TSH 2.41 12/15/2022 01:39 PM     continue levothyroxine 25 mcg, recheck 11-12 months    Lab Results   Component Value Date/Time    TSH 7.17 (H) 10/10/2023 09:06 AM    Increase levothyroxine to 50mcg, recheck 6-12 weeks.  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  Unlikely to be a cortical process (Alzheimer's), more likely to be a secondary process (anxiety, etc).  Also has vascular risk factors.  9/2023 - Dr. Arthur - MCI isn't dementia - it means more subjective memory loss than objective. Did discuss with pt that 20% of pt with MCI can progress to dementi    Neuropathic pain of shoulder 11/15/2018    Numbness 11/29/2018    Postoperatively, patient had left sided weakness with paresthesias on the left side, numbness in his perineum and private area, and loss of pain sensation on the right. Regained left-sided strength by post-op visit.    10/2018 - continues with the loss of sensation on the right side of his body.  He continues with hypersensitivity to light touch on bilateral upper extremities.  He has regained the    Obesity, Class I, BMI " 30-34.9 12/03/2023    IVELISSE (obstructive sleep apnea) 05/07/2012    [place] Not tolerating CPAP initially.  2/22/2023 - using CPAP occasionally, but not in the last 4 months.  10/10/2023 - not using CPAP - mask uncomfortable, dry mouth.      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 Solorio.    Postoperatively, patient had left sided weakness with paresthesias on the left side, numbness in his perineum and private area, and loss of pain sensation on the right. Regained left-sided strength by post-op visit.    10/    Osteoarthritis of glenohumeral joint, left 11/08/2018    PAD (peripheral artery disease) 10/10/2023    Prediabetes 06/07/2012    HEMOGLOBIN A1C MONITORING (POCT)   Date Value   06/07/2012 5.7 %   12/25/2007 5.8 % Total Hgb      HEMOGLOBIN A1C SCREENING (%)   Date Value   04/27/2021 5.6      GLUCOSE (mg/dL)   Date Value   09/04/2024 99   12/15/2022 104 (H)      Primary erectile dysfunction 11/20/2022    Primary osteoarthritis of right hip 11/29/2018    Pseudophakia, left eye 10/12/2016    Renal cyst 12/07/2017 2006 - CT stone run - 3cm cyst arising from the lower pole of the left kidney   2/2014 - CT - small simple right renal cyst, 6cm simple cortical left kidney cyst  3/2014 - US - 6.1 cm benign cyst inferior left kidney  2/25/15 - US - 6.1cm cyst lower pole left kidney  12/6/17 - US - 7.6 cm simple cyst left kidney.  Increased echoes within the cyst are favored to represent a combination of side lobe    Restless legs syndrome (RLS) 11/26/2007    S/P cervical spinal fusion 12/13/2024    Sleep apnea     Tear of right rotator cuff 11/08/2018    Thyroid disease     Weakness of right hip 11/29/2018        Surgical History:   Past Surgical History:   Procedure Laterality Date    GENITOURINARY  SURGERY      IMPLANT PROSTHESIS PENIS INFLATABLE N/A 12/28/2023    Procedure: PLACEMENT OF INFLATABLE PENILE PROSTHESIS;  Surgeon: Henok Harden MD;  Location: [facility] OR    LAMINECTOMY CERIVCAL POSTERIOR ONE LEVEL N/A 12/13/2024    Procedure: cervical 2 laminectomy and;  Surgeon: Geneva Abel MD;  Location: [facility] OR    OPTICAL TRACKING SYSTEM FUSION POSTERIOR CERVICAL TWO LEVELS Bilateral 12/13/2024    Procedure: right cervical 6-cervical 7 and bilateral cervical 7-thoracic 1 foraminotomies with cervical 6-thoracic 1 posterior instrumented fusion and arthrodesis with use of stealth.;  Surgeon: Geneva Abel MD;  Location: [facility] OR        Medications:   Current Outpatient Medications   Medication Sig Dispense Refill    ferrous sulfate (FEROSUL) 325 (65 Fe) MG tablet Take 325 mg by mouth.      methocarbamol (ROBAXIN) 500 MG tablet TAKE 2 TABS BY MOUTH ONCE IN THE MORNING AND ONCE IN THE EVENING AS NEEDED      methylPREDNISolone (MEDROL DOSEPAK) 4 MG tablet therapy pack TAKE 6 TABLETS ON DAY 1 AS DIRECTED ON PACKAGE AND DECREASE BY 1 TAB EACH DAY FOR A TOTAL OF 6 DAYS      PERMETHRIN EX CPAP machine for home use at pressure 5-15, Mask of choice x 1 q 3mo, nasal cushion x 2 q 1mo, nasal pillow x 2 q 1mo, and full-face cushion x 1 q 1mo, Length of need: 99, Daily use.      sildenafil (VIAGRA) 100 MG tablet Take 1 tablet by mouth.      allopurinol (ZYLOPRIM) 100 MG tablet Take 200 mg by mouth daily.      atenolol (TENORMIN) 25 MG tablet Take 25 mg by mouth daily      clobetasol (TEMOVATE) 0.05 % external ointment APPLY TOPICALLY TO AFFECTED AREA TWICE A DAY      Cyanocobalamin (VITAMIN B 12) 500 MCG TABS Take 1 tablet by mouth daily.      diclofenac (VOLTAREN) 1 % topical gel APPLY 4 G TOPICALLY TO AFFECTED AREA(S) 4 TIMES DAILY.      gabapentin (NEURONTIN) 300 MG capsule Take 600 mg by mouth 2 times daily.      HYDROcodone-acetaminophen (NORCO) 5-325 MG tablet PLEASE SEE  ATTACHED FOR DETAILED DIRECTIONS      levothyroxine (SYNTHROID/LEVOTHROID) 50 MCG tablet Take 1 tablet by mouth daily.      LINZESS 290 MCG capsule Take 290 mcg by mouth every morning      lisinopril (ZESTRIL) 5 MG tablet Take 5 mg by mouth daily      methocarbamol (ROBAXIN) 750 MG tablet Take 1 tablet (750 mg) by mouth every 6 hours. 42 tablet 0    naloxone (NARCAN) 4 MG/0.1ML nasal spray Spray 1 spray (4 mg) into one nostril alternating nostrils as needed for opioid reversal. every 2-3 minutes until assistance arrives 2 each 0    Omega-3 1000 MG capsule Take 1 g by mouth daily.      oxyCODONE-acetaminophen (PERCOCET) 5-325 MG tablet TAKE 1 TAB BY MOUTH EVERY 4 HOURS IF NEEDED FOR PAIN. MAX ACETAMINOPHEN DOSE:4000MG IN 24HRS      polyethylene glycol (MIRALAX) 17 g packet Take 1 packet by mouth daily      pramipexole (MIRAPEX) 0.25 MG tablet Take 0.5 mg by mouth 2 times daily      sodium phosphate (FLEET ENEMA) 7-19 GM/118ML rectal enema Place 1 enema rectally daily as needed for constipation.      tacrolimus (PROTOPIC) 0.1 % external ointment Apply topically 2 times daily as needed. Apply to eye brows      traMADol (ULTRAM) 50 MG tablet Take 1-2 tablets ( mg) by mouth 3 times daily as needed for pain. 42 tablet 0    triamcinolone (KENALOG) 0.025 % external ointment Apply topically 2 times daily as needed. Apply to lips      Vitamin D3 (VITAMIN D) 10 MCG (400 UNIT) tablet Take 1 tablet by mouth daily.       No current facility-administered medications for this visit.       Allergies:   Allergies   Allergen Reactions    Duloxetine      nightmares      Fluoxetine Nausea     Other reaction(s): GI Upset    Metoprolol Headache     Atenolol ok  Stopped due to persistent headache--tolerates atenolol without problem      Oxycodone     Sertraline Unknown    Paroxetine Other (See Comments)     Other reaction(s): Impotence         Family history: No family history on file.     Social History:   Social History      Socioeconomic History    Marital status:      Spouse name: Not on file    Number of children: Not on file    Years of education: Not on file    Highest education level: Not on file   Occupational History    Not on file   Tobacco Use    Smoking status: Never    Smokeless tobacco: Not on file   Substance and Sexual Activity    Alcohol use: Not Currently     Comment: small amount over [place]    Drug use: Never    Sexual activity: Not on file   Other Topics Concern    Parent/sibling w/ CABG, MI or angioplasty before 65F 55M? Not Asked   Social History Narrative    8/7/2019: Wife is bedside.     Social Drivers of Health     Financial Resource Strain: Low Risk  (10/22/2024)    Received from "[employer]    Financial Resource Strain     Difficulty of Paying Living Expenses: 3     Difficulty of Paying Living Expenses: Not on file   Food Insecurity: No Food Insecurity (10/22/2024)    Received from [facility]    Food Insecurity     Do you worry your food will run out before you are able to buy more?: 1   Transportation Needs: No Transportation Needs (10/22/2024)    Received from [facility]    Transportation Needs     Does lack of transportation keep you from medical appointments?: 1     Does lack of transportation keep you from work, meetings or getting things that you need?: 1   Physical Activity: Not on file   Stress: Not on file   Social Connections: Socially Integrated (10/22/2024)    [facility]    Social Connections     Do you often feel lonely or isolated from those around you?: 0   Interpersonal Safety: Not At Risk (12/30/2024)    Received from [facility]    Humiliation, Afraid, Rape, and Kick questionnaire     Fear of Current or Ex-Partner: No     Emotionally Abused: No     Physically Abused: No     Sexually Abused: No   Housing Stability: Low Risk   "(10/22/2024)    [facility] from [facility]    Housing Stability     What is your housing situation today?: 1        Physical Exam  Vitals: /83 (BP Location: Left arm)   Pulse 61   SpO2 96%   Weight is 0 lbs 0 oz          There is no height or weight on file to calculate BMI.  General: This is a 65 year old male who appears their reported age, not in acute distress  Head: normocephalic, Atraumatic; not wearing glasses; non-icteric sclera; no exudate; deaf; Wound #1 Location: posterior neck/scalp  Size: 0.2L x 0.2W x 0.1depth.  no sinus tract present, Wound base: boggy eschar covered this was removed and explored for suture none was found; there was an ingrown hair this was removed  no undermining present. Wound is full thickness. There is small drainage. There was a suture more distal  poking through the skin this was removed. Healed underneath Periwound: no denudement, erythema, induration, maceration or warmth.    Respiratory:  unlabored breathing; no cough   Skin: Uniformly warm and dry  Psych: Alert and oriented x4; calm and cooperative throughout exam  Abdomen: Normal bowel sounds. Soft, symmetric, no guarding or rigidity, nontender with palpation.  No organomegaly or masses palpated.       Circumferential volume measures:             No data to display                Ulceration(s)/Wound(s):          Laboratory studies:     I personally reviewed the following lab results today and those on care everywhere, if indicated     No results found for: \"CRP\"   Sed Rate   Date Value Ref Range Status   02/28/2020 8 0 - 20 mm/h Final      Last Renal Panel:  Sodium   Date Value Ref Range Status   12/18/2024 140 135 - 145 mmol/L Final     Potassium   Date Value Ref Range Status   12/18/2024 4.3 3.4 - 5.3 mmol/L Final   07/22/2022 4.8 3.5 - 5.0 mmol/L Final     Chloride   Date Value Ref Range Status   12/18/2024 103 98 - 107 mmol/L Final   07/22/2022 108 (H) 98 - 107 mmol/L Final " "    Carbon Dioxide (CO2)   Date Value Ref Range Status   12/18/2024 24 22 - 29 mmol/L Final   07/22/2022 28 22 - 31 mmol/L Final     Anion Gap   Date Value Ref Range Status   12/18/2024 13 7 - 15 mmol/L Final   07/22/2022 5 5 - 18 mmol/L Final     Glucose   Date Value Ref Range Status   12/18/2024 132 (H) 70 - 99 mg/dL Final   07/22/2022 95 70 - 125 mg/dL Final     GLUCOSE BY METER POCT   Date Value Ref Range Status   12/13/2024 85 70 - 99 mg/dL Final     Urea Nitrogen   Date Value Ref Range Status   12/18/2024 24.3 (H) 8.0 - 23.0 mg/dL Final   07/22/2022 16 8 - 22 mg/dL Final     Creatinine   Date Value Ref Range Status   12/18/2024 1.04 0.67 - 1.17 mg/dL Final     GFR Estimate   Date Value Ref Range Status   12/18/2024 81 >60 mL/min/1.73m2 Final     Comment:     eGFR calculated using 2021 CKD-EPI equation.   04/06/2021 >60 >60 mL/min/1.73m2 Final     Calcium   Date Value Ref Range Status   12/18/2024 9.1 8.8 - 10.4 mg/dL Final     Comment:     Reference intervals for this test were updated on 7/16/2024 to reflect our healthy population more accurately. There may be differences in the flagging of prior results with similar values performed with this method. Those prior results can be interpreted in the context of the updated reference intervals.     Albumin   Date Value Ref Range Status   04/06/2021 3.7 3.5 - 5.0 g/dL Final      Lab Results   Component Value Date    WBC 7.8 01/28/2025     Lab Results   Component Value Date    RBC 5.08 01/28/2025     Lab Results   Component Value Date    HGB 16.1 01/28/2025     Lab Results   Component Value Date    HCT 46.9 01/28/2025     No components found for: \"MCT\"  Lab Results   Component Value Date    MCV 92 01/28/2025     Lab Results   Component Value Date    MCH 31.7 01/28/2025     Lab Results   Component Value Date    MCHC 34.3 01/28/2025     Lab Results   Component Value Date    RDW 13.2 01/28/2025     Lab Results   Component Value Date     01/28/2025      Lab " "Results   Component Value Date    A1C 5.6 02/28/2020      TSH   Date Value Ref Range Status   04/06/2018 3.14 0.30 - 5.00 uIU/mL Final      No results found for: \"VITDT\"       Impression:    Encounter Diagnoses   Name Primary?    Thyroid disease Yes    S/P cervical spinal fusion          Assessment/Plan:  1. Debridement: Nursing staff remove the old dressing and cleanse the wound and surrounding skin with recommended solution. After discussion of risk factors and verbal consent was obtained 2% Lidocaine HCL jelly was applied, under clean conditions, the neck incision ulceration(s) were debrided using currette. Devitalized and nonviable tissue, along with any fibrin and slough, was removed to improve granulation tissue formation, stimulate wound healing, decrease overall bacteria load, disrupt biofilm formation and decrease edge senescence.  Total excisional debridement was 0.04 sq cm from the epidermis/dermis area or into the subcutaneous tissue with a depth of 0.1 cm.   Ulcers were improved afterwards and .  Measures were unchanged after debridement.    2. Treatment: wound treatment will include irrigation and dressings to promote autolytic debridement which will include: will use hibiclens to wash in the shower; then mupirocin ointment apply tid; unable to adhere dressing in this area    If for some reason the patient is not able to get your dressing(s) changed as outlined above (due to illness, lack of supplies, lack of help) please do the following: remove old, soiled dressings; wash the ulcers with saline; pat dry; apply ABD pad or other absorbant pad and secure with rolled gauze; avoid tape directly on your skin; Patient instructed to call the clinic as soon as possible to let us know what the current issues are in receiving ulcer care.    3. Secondary Lymphedema &Edema: na    4. Offloading: recommend he position off the wound however he is unable; has good pillow    5. Nutrition: pre-diabetic    6. " Wound Etiology: surgical     Patient to return to clinic in 2 week(s) for re-evaluation. They were instructed to call the clinic sooner with any further questions or concerns. Answered all questions.          Cinda Villatoro NP   [facility]  [phone]      This note was electronically signed by Cinda Villatoro NP

## 2025-06-10 NOTE — PROGRESS NOTES
Detail Level: Generalized POSTOPERATIVE FOLLOW-UP NURSE VISIT NOTE    Procedure: cervical 2 laminectomy and right cervical 6-cervical 7 and bilateral cervical 7-thoracic 1 foraminotomies with cervical 6-thoracic 1 posterior instrumented fusion and arthrodesis with use of stealth.   Date: 12/13/24  Surgeon: Dr. Abel    Complaint: patient states the superior portion of the wound is bleeding intermittently    Wound:  CDI,  approximated, very scant bloody drainage able to be expressed from superior portion of wound, no erythema, no temperature change compared to surrounding tissue, mildly tender.   Wound assessed by Dr. Abel who ordered a wound care consult, order placed and patient provided with contact information.

## 2025-06-15 ENCOUNTER — HEALTH MAINTENANCE LETTER (OUTPATIENT)
Age: 65
End: 2025-06-15

## 2025-06-23 NOTE — PROGRESS NOTES
"          Neurosurgery Clinic Note    Chief Complaint: surgical follow-up     DATE OF VISIT: 6/24/2025    HPI: Torsten La is a pleasant 65 year old male who is s/p  64-year-old male who is status post C2 cervical laminectomy,( had previous C3-6 posterior laminectomy) with C6 to T1 fusion on 12/13/2024 with Dr Geneva Abel.  Preoperative his symptoms were numbness on the right side with right hand pain, numbness and worsening imbalance.   Patient was last seen by Dr Abel in March 2025 and that time was doing well.       States he is doing much since his last visit in December, states the neck pain and mobility has improved.  Still having issues with balance issues, states he has not undergone PT for this and is not interested in doing so.  Denies arm pain, but does complain of pain in the shoulder region.   No issues with bowel or bladder function.   The patient is also concerned about \"blackheads\" that have been developed near the proximal aspect of his wound.      Patient presents today accompanied by .      Review of Systems    Negative except in HPI    Past Medical History:   Diagnosis Date    Adenomatous polyp of colon 09/28/2010    Colonoscopy 7/10, advised next colonoscopy in 4 years.  Colonoscopy 2/2015--normal, 5 year follow-up   Colonoscopy 2/2021 - 5 year follow-up      Adjustment disorder with depressed mood 03/14/2019 12/13/2021     8:00 AM 11/8/2022    11:00 AM 10/10/2023     7:00 AM   PHQ Depression Screening   Date of PHQ exam (doc flow) 12/13/2021 11/8/2022 10/10/2023   1. Lack of interest/pleasure 0 - Not at all 0 - Not at all 0 - Not at all   2. Feeling down/depressed 1 - Several days 0 - Not at all 1 - Several days   PHQ-2 TOTAL SCORE 1 0 1   3. 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    Cervical myelopathy (H) 07/18/2018    Cervical radiculopathy 11/25/2024    Cervical spondylosis with myelopathy 11/25/2024    Chronic " "constipation 12/03/2023    Chronic midline low back pain with right-sided sciatica 09/09/2019    CKD (chronic kidney disease) stage 3, GFR 30-59 ml/min (H) 12/15/2022    CREATININE (mg/dL)   Date Value   09/04/2024 1.64 (H)   12/15/2022 1.19      Conductive hearing loss 12/13/2024    Deaf since age 17      Depression, unspecified depression type 08/07/2019    Depressive disorder     Disseminated idiopathic skeletal hyperostosis 04/12/2022    Dry mouth 09/14/2024    Fatty liver 12/15/2022    10/2020 - ultrasound - fatty liver  2/2021 CT without contrast - normal liver      Gastroesophageal reflux disease     GERD (gastroesophageal reflux disease) 01/25/2012    Gout     Greater trochanteric bursitis, right 11/29/2018    Hepatitis B, chronic (H) 07/14/2010    Hep BE antibody positive, hep BE antigen negative with extremely low viral level (detectable but too low to be counted).  No treatment indicated, Should be followed with LFTs every 6 months, BE antigen every 6-12 months and AFP and US every 6-12 months and viral level yearly.  Follow up with GI yearly.  1/31/23 Dr. Rodrigez - I will check a hepatitis-B viral DNA. If this is negative, I think     History of colon polyps 12/15/2022    2/2021 colonoscopy - polyp, recheck 5 years   11/21/24 \"[employer]\" at-home FIT test provided by health plan, result negative.  Still needs colonoscopy in 2/2026.      Hypertension     Hypothyroidism (acquired) 12/18/2022    Lab Results   Component Value Date/Time    TSH 2.41 12/15/2022 01:39 PM     continue levothyroxine 25 mcg, recheck 11-12 months    Lab Results   Component Value Date/Time    TSH 7.17 (H) 10/10/2023 09:06 AM    Increase levothyroxine to 50mcg, recheck 6-12 weeks.  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  Unlikely to be a cortical process (Alzheimer's), more likely to be a secondary process (anxiety, etc).  Also has vascular risk factors.  9/2023 - Dr. Arthur - MCI isn't dementia - it means more subjective memory loss than objective. Did discuss with pt that 20% of pt with MCI can progress to dementi    Neuropathic pain of shoulder 11/15/2018    Numbness 11/29/2018    Postoperatively, patient had left sided weakness with paresthesias on the left side, numbness in his perineum and private area, and loss of pain sensation on the right. Regained left-sided strength by post-op visit.    10/2018 - continues with the loss of sensation on the right side of his body.  He continues with hypersensitivity to light touch on bilateral upper extremities.  He has regained the    Obesity, Class I, BMI 30-34.9 12/03/2023    IVELISSE (obstructive sleep apnea) 05/07/2012    [place] Not tolerating CPAP initially.  2/22/2023 - using CPAP occasionally, but not in the last 4 months.  10/10/2023 - not using CPAP - mask uncomfortable, dry mouth.      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 Solorio.    Postoperatively, patient had left sided weakness with paresthesias on the left side, numbness in his perineum and private area, and loss of pain sensation on the right. Regained left-sided strength by post-op visit.    10/    Osteoarthritis of glenohumeral joint, left 11/08/2018    PAD (peripheral artery disease) 10/10/2023    Prediabetes 06/07/2012     HEMOGLOBIN A1C MONITORING (POCT)   Date Value   06/07/2012 5.7 %   12/25/2007 5.8 % Total Hgb      HEMOGLOBIN A1C SCREENING (%)   Date Value   04/27/2021 5.6      GLUCOSE (mg/dL)   Date Value   09/04/2024 99   12/15/2022 104 (H)      Primary erectile dysfunction 11/20/2022    Primary osteoarthritis of right hip 11/29/2018    Pseudophakia, left eye 10/12/2016    Renal cyst 12/07/2017    2006 - CT stone run - 3cm cyst arising from the lower pole of the left kidney   2/2014 - CT - small simple right renal cyst, 6cm simple cortical left kidney cyst  3/2014 - US - 6.1 cm benign cyst inferior left kidney  2/25/15 - US - 6.1cm cyst lower pole left kidney  12/6/17 - US - 7.6 cm simple cyst left kidney.  Increased echoes within the cyst are favored to represent a combination of side lobe    Restless legs syndrome (RLS) 11/26/2007    S/P cervical spinal fusion 12/13/2024    Sleep apnea     Tear of right rotator cuff 11/08/2018    Thyroid disease     Weakness of right hip 11/29/2018       Past Surgical History:   Procedure Laterality Date    GENITOURINARY SURGERY      IMPLANT PROSTHESIS PENIS INFLATABLE N/A 12/28/2023    Procedure: PLACEMENT OF INFLATABLE PENILE PROSTHESIS;  Surgeon: Henok Harden MD;  Location: [facility] OR    LAMINECTOMY CERIVCAL POSTERIOR ONE LEVEL N/A 12/13/2024    Procedure: cervical 2 laminectomy and;  Surgeon: Geneva Abel MD;  Location: [facility] OR    OPTICAL TRACKING SYSTEM FUSION POSTERIOR CERVICAL TWO LEVELS Bilateral 12/13/2024    Procedure: right cervical 6-cervical 7 and bilateral cervical 7-thoracic 1 foraminotomies with cervical 6-thoracic 1 posterior instrumented fusion and arthrodesis with use of stealth.;  Surgeon: Geneva Abel MD;  Location: [facility] OR       Social History     Socioeconomic History    Marital status:    Tobacco Use    Smoking status: Never   Substance and Sexual Activity    Alcohol use: Not Currently     Comment: small  amount over Yeyo    Drug use: Never   Social History Narrative    8/7/2019: Wife is bedside.     Social Drivers of Health     Financial Resource Strain: Low Risk  (10/22/2024)    Received from [email or web address]    Financial Resource Strain     Difficulty of Paying Living Expenses: 3   Food Insecurity: No Food Insecurity (10/22/2024)    Received from [facility]    Food Insecurity     Do you worry your food will run out before you are able to buy more?: 1   Transportation Needs: No Transportation Needs (10/22/2024)    Received from [facility]    Transportation Needs     Does lack of transportation keep you from medical appointments?: 1     Does lack of transportation keep you from work, meetings or getting things that you need?: 1   Social Connections: Socially Integrated (10/22/2024)    Received from [email or web address]    Social Connections     Do you often feel lonely or isolated from those around you?: 0   Interpersonal Safety: Not At Risk (12/30/2024)    Received from [facility]    Humiliation, Afraid, Rape, and Kick questionnaire     Fear of Current or Ex-Partner: No     Emotionally Abused: No     Physically Abused: No     Sexually Abused: No   Housing Stability: Low Risk  (10/22/2024)    Received from [email or web address]    Housing Stability     What is your housing situation today?: 1       family history is not on file.              Physical Exam   There were no vitals taken for this visit.  Constitutional: Oriented to person, place, and time. Appears well-developed and well-nourished. Cooperative. No distress.   HENT: Head normocephalic and atraumatic.     Incision:  Clean, dry and intact.  Wound edges have approximated nicely.  There is some follicular irritation near the apex of the wound.   Neurological: alert and oriented to person, place, and time. CN  II-XII grossly  intact      STRENGTH LEFT RIGHT   Deltoid 5 5   Bicep 5 5   Wrist Extensor 5 5   Tricep 5 5   Finger flexion 5 5   Finger abduction 5 5    5 5       Hip Flexion     5     5   Knee Extension 5 5   Ankle Dorsiflexion 5 5   Extensor Hallucis Longus 5 5   Plantar Flexion 5 5   Foot eversion 5 5   Foot inversion 5 5       Skin: Skin is warm, dry and intact.   Psychiatric: Normal mood and affect. Speech is normal and behavior is normal.      IMAGING:  Personally reviewed cervical x-rays dated 6/24/2025   Postoperative changes in the cervical spine. Multilevel decompressive laminectomies at and below C2-C3. Dorsal hardware instrumentation and fusion posterior elements of C6 with pedicle screws C7 and T1 appear intact. No hardware fracture or failure.   There is at least partial integration of dorsolateral bone grafting associated with the right side of the hardware. Lung apices are clear. Vertebral body height and alignment are satisfactory. Bridging and nonbridging osteophytes anteriorly throughout   cervical spine. Appropriate appearance to the airway and cervical prevertebral soft tissues. Stable appearance from prior study 3/18/2025.    ASSESSMENT:  Torsten La is a 65 year old male s/p C2 cervical laminectomy,( had previous C3-6 posterior laminectomy) with C6 to T1 fusion on 12/13/2024     PLAN:  Recommend continue therapy. Continue to avoid high impact activity or lifting repetitively more then 50 pounds.    If you should sustain new trauma or injury or note increased headache, weakness, speech or vision issues, confusion, or other neurologic changes please call 911 or present to your nearest emergency room for further evaluation.     Follow-up with KALLIE in 3 months with upright x-rays.     Recommend follow-up with PCP or dermatology regarding the follicular lesions on his skull.                I spent 30 minutes in patient care with greater than 50% spent in counseling and/or coordination of care.      I performed independent visualization of radiographic imaging and entered my own interpretation, reviewed and/or ordered tests in radiology        HOMERO Zimmer, CNP  Department of Neurosurgery  Pager: [phone]

## 2025-06-24 ENCOUNTER — HOSPITAL ENCOUNTER (OUTPATIENT)
Dept: RADIOLOGY | Facility: HOSPITAL | Age: 65
Discharge: HOME | End: 2025-06-24
Attending: SURGERY
Payer: COMMERCIAL

## 2025-06-24 ENCOUNTER — OFFICE VISIT (OUTPATIENT)
Dept: NEUROSURGERY | Facility: CLINIC | Age: 65
End: 2025-06-24
Attending: SURGERY
Payer: COMMERCIAL

## 2025-06-24 VITALS
HEIGHT: 60 IN | BODY MASS INDEX: 32.39 KG/M2 | WEIGHT: 165 LBS | DIASTOLIC BLOOD PRESSURE: 69 MMHG | SYSTOLIC BLOOD PRESSURE: 110 MMHG | OXYGEN SATURATION: 96 % | HEART RATE: 65 BPM

## 2025-06-24 DIAGNOSIS — Z98.1 S/P CERVICAL SPINAL FUSION: ICD-10-CM

## 2025-06-24 DIAGNOSIS — Z98.1 S/P CERVICAL SPINAL FUSION: Primary | ICD-10-CM

## 2025-06-24 PROCEDURE — 3074F SYST BP LT 130 MM HG: CPT | Performed by: NURSE PRACTITIONER

## 2025-06-24 PROCEDURE — 1126F AMNT PAIN NOTED NONE PRSNT: CPT | Performed by: NURSE PRACTITIONER

## 2025-06-24 PROCEDURE — 3078F DIAST BP <80 MM HG: CPT | Performed by: NURSE PRACTITIONER

## 2025-06-24 PROCEDURE — 99214 OFFICE O/P EST MOD 30 MIN: CPT | Performed by: NURSE PRACTITIONER

## 2025-06-24 PROCEDURE — 72040 X-RAY EXAM NECK SPINE 2-3 VW: CPT

## 2025-06-24 ASSESSMENT — PAIN SCALES - GENERAL: PAINLEVEL_OUTOF10: NO PAIN (0)

## 2025-06-24 NOTE — LETTER
"6/24/2025      Torsten La  [address]  [place] [address]      Dear Colleague,    Thank you for referring your patient, Torsten La, to the [facility]. Please see a copy of my visit note below.              Neurosurgery Clinic Note    Chief Complaint: surgical follow-up     DATE OF VISIT: 6/24/2025    HPI: Torsten La is a pleasant 65 year old male who is s/p  64-year-old male who is status post C2 cervical laminectomy,( had previous C3-6 posterior laminectomy) with C6 to T1 fusion on 12/13/2024 with Dr Geneva Abel.  Preoperative his symptoms were numbness on the right side with right hand pain, numbness and worsening imbalance.   Patient was last seen by Dr Abel in March 2025 and that time was doing well.       States he is doing much since his last visit in December, states the neck pain and mobility has improved.  Still having issues with balance issues, states he has not undergone PT for this and is not interested in doing so.  Denies arm pain, but does complain of pain in the shoulder region.   No issues with bowel or bladder function.   The patient is also concerned about \"blackheads\" that have been developed near the proximal aspect of his wound.      Patient presents today accompanied by .      Review of Systems    Negative except in HPI    Past Medical History:   Diagnosis Date     Adenomatous polyp of colon 09/28/2010    Colonoscopy 7/10, advised next colonoscopy in 4 years.  Colonoscopy 2/2015--normal, 5 year follow-up   Colonoscopy 2/2021 - 5 year follow-up       Adjustment disorder with depressed mood 03/14/2019 12/13/2021     8:00 AM 11/8/2022    11:00 AM 10/10/2023     7:00 AM   PHQ Depression Screening   Date of PHQ exam (doc flow) 12/13/2021 11/8/2022 10/10/2023   1. Lack of interest/pleasure 0 - Not at all 0 - Not at all 0 - Not at all   2. Feeling down/depressed 1 - Several days 0 - Not at all 1 - Several days   PHQ-2 TOTAL SCORE 1 0 1   3. 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     Cervical myelopathy (H) 07/18/2018     Cervical radiculopathy 11/25/2024     Cervical spondylosis with myelopathy 11/25/2024     Chronic constipation 12/03/2023     Chronic midline low back pain with right-sided sciatica 09/09/2019     CKD (chronic kidney disease) stage 3, GFR 30-59 ml/min (H) 12/15/2022    CREATININE (mg/dL)   Date Value   09/04/2024 1.64 (H)   12/15/2022 1.19       Conductive hearing loss 12/13/2024    Deaf since age 17       Depression, unspecified depression type 08/07/2019     Depressive disorder      Disseminated idiopathic skeletal hyperostosis 04/12/2022     Dry mouth 09/14/2024     Fatty liver 12/15/2022    10/2020 - ultrasound - fatty liver  2/2021 CT without contrast - normal liver       Gastroesophageal reflux disease      GERD (gastroesophageal reflux disease) 01/25/2012     Gout      Greater trochanteric bursitis, right 11/29/2018     Hepatitis B, chronic (H) 07/14/2010    Hep BE antibody positive, hep BE antigen negative with extremely low viral level (detectable but too low to be counted).  No treatment indicated, Should be followed with LFTs every 6 months, BE antigen every 6-12 months and AFP and US every 6-12 months and viral level yearly.  Follow up with GI yearly.  1/31/23 Dr. Rodrigez - I will check a hepatitis-B viral DNA. If this is negative, I think      History of colon polyps 12/15/2022    2/2021 colonoscopy - polyp, recheck 5 years   11/21/24 \"[employer]sGet[employer]\" at-home FIT test provided by health plan, result negative.  Still needs colonoscopy in 2/2026.       Hypertension      Hypothyroidism (acquired) 12/18/2022    Lab Results   Component Value Date/Time    TSH 2.41 12/15/2022 01:39 PM     continue levothyroxine 25 mcg, recheck 11-12 months    Lab Results   Component Value Date/Time    TSH 7.17 (H) 10/10/2023 09:06 AM    Increase levothyroxine to 50mcg, recheck 6-12 " weeks.  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  Unlikely to be a cortical process (Alzheimer's), more likely to be a secondary process (anxiety, etc).  Also has vascular risk factors.  9/2023 - Dr. Arthur - MCI isn't dementia - it means more subjective memory loss than objective. Did discuss with pt that 20% of pt with MCI can progress to dementi     Neuropathic pain of shoulder 11/15/2018     Numbness 11/29/2018    Postoperatively, patient had left sided weakness with paresthesias on the left side, numbness in his perineum and private area, and loss of pain sensation on the right. Regained left-sided strength by post-op visit.    10/2018 - continues with the loss of sensation on the right side of his body.  He continues with hypersensitivity to light touch on bilateral upper extremities.  He has regained the     Obesity, Class I, BMI 30-34.9 12/03/2023     IVELISSE (obstructive sleep apnea) 05/07/2012    [place] Not tolerating CPAP initially.  2/22/2023 - using CPAP occasionally, but not in the last 4 months.  10/10/2023 - not using CPAP - mask uncomfortable, dry mouth.       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 Solorio.    Postoperatively, patient had left sided weakness with paresthesias on the left side, numbness  in his perineum and private area, and loss of pain sensation on the right. Regained left-sided strength by post-op visit.    10/     Osteoarthritis of glenohumeral joint, left 11/08/2018     PAD (peripheral artery disease) 10/10/2023     Prediabetes 06/07/2012    HEMOGLOBIN A1C MONITORING (POCT)   Date Value   06/07/2012 5.7 %   12/25/2007 5.8 % Total Hgb      HEMOGLOBIN A1C SCREENING (%)   Date Value   04/27/2021 5.6      GLUCOSE (mg/dL)   Date Value   09/04/2024 99   12/15/2022 104 (H)       Primary erectile dysfunction 11/20/2022     Primary osteoarthritis of right hip 11/29/2018     Pseudophakia, left eye 10/12/2016     Renal cyst 12/07/2017 2006 - CT stone run - 3cm cyst arising from the lower pole of the left kidney   2/2014 - CT - small simple right renal cyst, 6cm simple cortical left kidney cyst  3/2014 - US - 6.1 cm benign cyst inferior left kidney  2/25/15 - US - 6.1cm cyst lower pole left kidney  12/6/17 - US - 7.6 cm simple cyst left kidney.  Increased echoes within the cyst are favored to represent a combination of side lobe     Restless legs syndrome (RLS) 11/26/2007     S/P cervical spinal fusion 12/13/2024     Sleep apnea      Tear of right rotator cuff 11/08/2018     Thyroid disease      Weakness of right hip 11/29/2018       Past Surgical History:   Procedure Laterality Date     GENITOURINARY SURGERY       IMPLANT PROSTHESIS PENIS INFLATABLE N/A 12/28/2023    Procedure: PLACEMENT OF INFLATABLE PENILE PROSTHESIS;  Surgeon: Henok Harden MD;  Location: [facility] OR     LAMINECTOMY CERIVCAL POSTERIOR ONE LEVEL N/A 12/13/2024    Procedure: cervical 2 laminectomy and;  Surgeon: Geneva Abel MD;  Location: [facility] OR     OPTICAL TRACKING SYSTEM FUSION POSTERIOR CERVICAL TWO LEVELS Bilateral 12/13/2024    Procedure: right cervical 6-cervical 7 and bilateral cervical 7-thoracic 1 foraminotomies with cervical 6-thoracic 1 posterior instrumented fusion and arthrodesis with use  [facility].;  Surgeon: Geneva Abel MD;  Location: [facility] OR       Social History     Socioeconomic History     Marital status:    Tobacco Use     Smoking status: Never   Substance and Sexual Activity     Alcohol use: Not Currently     Comment: small amount over [employer]     Drug use: Never   Social History Narrative    8/7/2019: Wife is bedside.     Social Drivers of Health     Financial Resource Strain: Low Risk  (10/22/2024)    Received from [facility] [employer] [facility]    Financial Resource Strain      Difficulty of Paying Living Expenses: 3   Food Insecurity: No Food Insecurity (10/22/2024)    Received from [facility] [employer] [facility]    Food Insecurity      Do you worry your food will run out before you are able to buy more?: 1   Transportation Needs: No Transportation Needs (10/22/2024)    Received from [facility] [employer] [facility]    Transportation Needs      Does lack of transportation keep you from medical appointments?: 1      Does lack of transportation keep you from work, meetings or getting things that you need?: 1   Social Connections: Socially Integrated (10/22/2024)    Received from [facility] [employer] [facility]    Social Connections      Do you often feel lonely or isolated from those around you?: 0   Interpersonal Safety: Not At Risk (12/30/2024)    Received from [facility]    Humiliation, Afraid, Rape, and Kick questionnaire      Fear of Current or Ex-Partner: No      Emotionally Abused: No      Physically Abused: No      Sexually Abused: No   Housing Stability: Low Risk  (10/22/2024)    Received from [facility] [employer] [employer] [facility]    Housing Stability      What is your housing situation today?: 1       family history is not on file.              Physical Exam   There were no vitals taken for this visit.  Constitutional: Oriented to person, place, and time. Appears well-developed and  well-nourished. Cooperative. No distress.   HENT: Head normocephalic and atraumatic.     Incision:  Clean, dry and intact.  Wound edges have approximated nicely.  There is some follicular irritation near the apex of the wound.   Neurological: alert and oriented to person, place, and time. CN II-XII grossly  intact      STRENGTH LEFT RIGHT   Deltoid 5 5   Bicep 5 5   Wrist Extensor 5 5   Tricep 5 5   Finger flexion 5 5   Finger abduction 5 5    5 5       Hip Flexion     5     5   Knee Extension 5 5   Ankle Dorsiflexion 5 5   Extensor Hallucis Longus 5 5   Plantar Flexion 5 5   Foot eversion 5 5   Foot inversion 5 5       Skin: Skin is warm, dry and intact.   Psychiatric: Normal mood and affect. Speech is normal and behavior is normal.      IMAGING:  Personally reviewed cervical x-rays dated 6/24/2025   Postoperative changes in the cervical spine. Multilevel decompressive laminectomies at and below C2-C3. Dorsal hardware instrumentation and fusion posterior elements of C6 with pedicle screws C7 and T1 appear intact. No hardware fracture or failure.   There is at least partial integration of dorsolateral bone grafting associated with the right side of the hardware. Lung apices are clear. Vertebral body height and alignment are satisfactory. Bridging and nonbridging osteophytes anteriorly throughout   cervical spine. Appropriate appearance to the airway and cervical prevertebral soft tissues. Stable appearance from prior study 3/18/2025.    ASSESSMENT:  Torsten La is a 65 year old male s/p C2 cervical laminectomy,( had previous C3-6 posterior laminectomy) with C6 to T1 fusion on 12/13/2024     PLAN:  Recommend continue therapy. Continue to avoid high impact activity or lifting repetitively more then 50 pounds.    If you should sustain new trauma or injury or note increased headache, weakness, speech or vision issues, confusion, or other neurologic changes please call 911 or present to your nearest emergency room for  further evaluation.     Follow-up with KALLIE in 3 months with upright x-rays.     Recommend follow-up with PCP or dermatology regarding the follicular lesions on his skull.                I spent 30 minutes in patient care with greater than 50% spent in counseling and/or coordination of care.     I performed independent visualization of radiographic imaging and entered my own interpretation, reviewed and/or ordered tests in radiology        HOMERO Zimmer, CNP  Department of Neurosurgery  Pager: [phone]      Again, thank you for allowing me to participate in the care of your patient.        Sincerely,        HOMERO Norman CNP    Electronically signed

## 2025-06-24 NOTE — NURSING NOTE
Torsten La is a 65 year old male who presents for:  Chief Complaint   Patient presents with    RECHECK     6 month post-op for cervical spinal fusion. Patient reports no pain currently, while on medications. But pain can get up to 5-6 without medication. Constant numbness/tingling on right side, from hip and down right leg.        Initial Vitals:  /69   Pulse 65   Ht 5' (1.524 m)   Wt 165 lb (74.8 kg)   SpO2 96%   BMI 32.22 kg/m   Estimated body mass index is 32.22 kg/m  as calculated from the following:    Height as of this encounter: 5' (1.524 m).    Weight as of this encounter: 165 lb (74.8 kg). Body surface area is 1.78 meters squared. BP completed using cuff size: regular  No Pain (0)        Celestino Louie

## 2025-06-30 ENCOUNTER — HOSPITAL ENCOUNTER (OUTPATIENT)
Dept: NUCLEAR MEDICINE | Facility: HOSPITAL | Age: 65
Discharge: HOME | End: 2025-06-30
Attending: INTERNAL MEDICINE
Payer: COMMERCIAL

## 2025-06-30 DIAGNOSIS — R68.81 EARLY SATIETY: ICD-10-CM

## 2025-06-30 DIAGNOSIS — B18.1 CHRONIC VIRAL HEPATITIS B WITHOUT DELTA AGENT AND WITHOUT COMA (H): ICD-10-CM

## 2025-06-30 DIAGNOSIS — R14.0 BLOATING: ICD-10-CM

## 2025-06-30 DIAGNOSIS — K76.0 HEPATIC STEATOSIS: ICD-10-CM

## 2025-06-30 PROCEDURE — A9541 TC99M SULFUR COLLOID: HCPCS | Performed by: INTERNAL MEDICINE

## 2025-06-30 PROCEDURE — 343N000001 HC RX 343 MED OP 636: Performed by: INTERNAL MEDICINE

## 2025-06-30 PROCEDURE — 78264 GASTRIC EMPTYING IMG STUDY: CPT

## 2025-06-30 RX ADMIN — TECHNETIUM TC 99M SULFUR COLLOID 1 MILLICURIE: KIT at 09:25

## 2025-07-29 ENCOUNTER — TELEPHONE (OUTPATIENT)
Dept: NEUROSURGERY | Facility: CLINIC | Age: 65
End: 2025-07-29
Payer: COMMERCIAL

## 2025-07-29 NOTE — TELEPHONE ENCOUNTER
[facility] Call Center    Phone Message    May a detailed message be left on voicemail: yes     Reason for Call: Symptoms or Concerns     If patient has red-flag symptoms, warm transfer to triage line    Current symptom or concern: New area of pain: low back    Symptoms have been present for:  3 day(s)    Has patient previously been seen for this? Yes      Are there any new or worsening symptoms? Yes: Pt states he has new pain in his low back and would like to discuss further with Dr Abel or DRU Myrick if possible. Pt states pain so bad he can't move his hip.  Please review and contact pt to discuss further. Willing to come in if needed, would prefer Dr Abel if possible. ASL interp needed.     Action Taken: Other: [facility] Neurosurgery    Travel Screening: Not Applicable     Date of Service:

## 2025-07-31 ENCOUNTER — MYC MEDICAL ADVICE (OUTPATIENT)
Dept: NEUROSURGERY | Facility: CLINIC | Age: 65
End: 2025-07-31
Payer: COMMERCIAL

## 2025-07-31 NOTE — TELEPHONE ENCOUNTER
Patient electing to cancel  follow-up appointments. Discussed with Dr. Abel if patient is feeling better it is ok to cancel follow-up.

## 2025-08-05 ENCOUNTER — TELEPHONE (OUTPATIENT)
Dept: NEUROSURGERY | Facility: CLINIC | Age: 65
End: 2025-08-05
Payer: COMMERCIAL

## 2025-08-05 DIAGNOSIS — M54.16 LUMBAR RADICULOPATHY: Primary | ICD-10-CM

## 2025-08-11 ENCOUNTER — HOSPITAL ENCOUNTER (OUTPATIENT)
Dept: RADIOLOGY | Facility: HOSPITAL | Age: 65
Discharge: HOME | End: 2025-08-11
Attending: SURGERY | Admitting: SURGERY
Payer: COMMERCIAL

## 2025-08-11 DIAGNOSIS — M54.16 LUMBAR RADICULOPATHY: ICD-10-CM

## 2025-08-11 PROCEDURE — 72110 X-RAY EXAM L-2 SPINE 4/>VWS: CPT

## 2025-08-12 ENCOUNTER — OFFICE VISIT (OUTPATIENT)
Dept: NEUROSURGERY | Facility: CLINIC | Age: 65
End: 2025-08-12
Payer: COMMERCIAL

## 2025-08-12 ENCOUNTER — PREP FOR PROCEDURE (OUTPATIENT)
Dept: NEUROLOGY | Facility: CLINIC | Age: 65
End: 2025-08-12

## 2025-08-12 VITALS
HEIGHT: 60 IN | BODY MASS INDEX: 32.39 KG/M2 | OXYGEN SATURATION: 96 % | HEART RATE: 66 BPM | DIASTOLIC BLOOD PRESSURE: 75 MMHG | SYSTOLIC BLOOD PRESSURE: 126 MMHG | WEIGHT: 165 LBS

## 2025-08-12 DIAGNOSIS — M54.16 LUMBAR RADICULOPATHY: Primary | ICD-10-CM

## 2025-08-12 PROCEDURE — 3074F SYST BP LT 130 MM HG: CPT | Performed by: SURGERY

## 2025-08-12 PROCEDURE — 1125F AMNT PAIN NOTED PAIN PRSNT: CPT | Performed by: SURGERY

## 2025-08-12 PROCEDURE — 99213 OFFICE O/P EST LOW 20 MIN: CPT | Performed by: SURGERY

## 2025-08-12 PROCEDURE — T1013 SIGN LANG/ORAL INTERPRETER: HCPCS | Mod: U3

## 2025-08-12 PROCEDURE — 3078F DIAST BP <80 MM HG: CPT | Performed by: SURGERY

## 2025-08-12 ASSESSMENT — PAIN SCALES - GENERAL: PAINLEVEL_OUTOF10: SEVERE PAIN (7)

## 2025-08-18 ENCOUNTER — VIRTUAL VISIT (OUTPATIENT)
Dept: NEUROSURGERY | Facility: CLINIC | Age: 65
End: 2025-08-18
Payer: COMMERCIAL

## 2025-08-18 DIAGNOSIS — M54.16 LUMBAR RADICULOPATHY: Primary | ICD-10-CM

## 2025-08-18 PROCEDURE — 99207 PR NO CHARGE NURSE ONLY: CPT | Mod: 93

## 2025-08-21 RX ORDER — BISACODYL 5 MG/1
5 TABLET, DELAYED RELEASE ORAL DAILY PRN
COMMUNITY

## 2025-08-24 ENCOUNTER — ANESTHESIA EVENT (OUTPATIENT)
Dept: SURGERY | Facility: HOSPITAL | Age: 65
End: 2025-08-24
Payer: COMMERCIAL

## 2025-08-25 ENCOUNTER — APPOINTMENT (OUTPATIENT)
Dept: RADIOLOGY | Facility: HOSPITAL | Age: 65
End: 2025-08-25
Payer: COMMERCIAL

## 2025-08-25 ENCOUNTER — APPOINTMENT (OUTPATIENT)
Dept: RADIOLOGY | Facility: HOSPITAL | Age: 65
End: 2025-08-25
Attending: SURGERY
Payer: COMMERCIAL

## 2025-08-25 ENCOUNTER — HOSPITAL ENCOUNTER (OUTPATIENT)
Facility: HOSPITAL | Age: 65
Discharge: HOME | End: 2025-08-25
Attending: SURGERY | Admitting: SURGERY
Payer: COMMERCIAL

## 2025-08-25 ENCOUNTER — OFFICE VISIT (OUTPATIENT)
Dept: INTERPRETER SERVICES | Facility: CLINIC | Age: 65
End: 2025-08-25
Payer: COMMERCIAL

## 2025-08-25 ENCOUNTER — OFFICE VISIT (OUTPATIENT)
Dept: INTERPRETER SERVICES | Facility: CLINIC | Age: 65
End: 2025-08-25

## 2025-08-25 ENCOUNTER — ANESTHESIA (OUTPATIENT)
Dept: SURGERY | Facility: HOSPITAL | Age: 65
End: 2025-08-25
Payer: COMMERCIAL

## 2025-08-25 VITALS
RESPIRATION RATE: 14 BRPM | DIASTOLIC BLOOD PRESSURE: 74 MMHG | TEMPERATURE: 98 F | BODY MASS INDEX: 31.52 KG/M2 | OXYGEN SATURATION: 95 % | WEIGHT: 161.4 LBS | HEART RATE: 88 BPM | SYSTOLIC BLOOD PRESSURE: 124 MMHG

## 2025-08-25 DIAGNOSIS — Z98.890 S/P SPINAL SURGERY: Primary | ICD-10-CM

## 2025-08-25 PROCEDURE — 370N000017 HC ANESTHESIA TECHNICAL FEE, PER MIN: Performed by: SURGERY

## 2025-08-25 PROCEDURE — 360N000077 HC SURGERY LEVEL 4, PER MIN: Performed by: SURGERY

## 2025-08-25 PROCEDURE — P9045 ALBUMIN (HUMAN), 5%, 250 ML: HCPCS | Mod: JZ | Performed by: NURSE ANESTHETIST, CERTIFIED REGISTERED

## 2025-08-25 PROCEDURE — 999N000063 XR LUMBAR SPINE PORT 1 VIEW

## 2025-08-25 PROCEDURE — 250N000013 HC RX MED GY IP 250 OP 250 PS 637

## 2025-08-25 PROCEDURE — 272N000001 HC OR GENERAL SUPPLY STERILE: Performed by: SURGERY

## 2025-08-25 PROCEDURE — 258N000003 HC RX IP 258 OP 636: Performed by: NURSE ANESTHETIST, CERTIFIED REGISTERED

## 2025-08-25 PROCEDURE — 258N000003 HC RX IP 258 OP 636: Performed by: ANESTHESIOLOGY

## 2025-08-25 PROCEDURE — 250N000009 HC RX 250: Performed by: SURGERY

## 2025-08-25 PROCEDURE — 250N000025 HC SEVOFLURANE, PER MIN: Performed by: SURGERY

## 2025-08-25 PROCEDURE — 250N000011 HC RX IP 250 OP 636

## 2025-08-25 PROCEDURE — T1013 SIGN LANG/ORAL INTERPRETER: HCPCS | Mod: U3

## 2025-08-25 PROCEDURE — 710N000009 HC RECOVERY PHASE 1, LEVEL 1, PER MIN: Performed by: SURGERY

## 2025-08-25 PROCEDURE — 63030 LAMOT DCMPRN NRV RT 1 LMBR: CPT | Mod: RT | Performed by: SURGERY

## 2025-08-25 PROCEDURE — 250N000011 HC RX IP 250 OP 636: Performed by: SURGERY

## 2025-08-25 PROCEDURE — 72020 X-RAY EXAM OF SPINE 1 VIEW: CPT

## 2025-08-25 PROCEDURE — 250N000011 HC RX IP 250 OP 636: Performed by: ANESTHESIOLOGY

## 2025-08-25 PROCEDURE — 999N000141 HC STATISTIC PRE-PROCEDURE NURSING ASSESSMENT: Performed by: SURGERY

## 2025-08-25 PROCEDURE — 63030 LAMOT DCMPRN NRV RT 1 LMBR: CPT | Mod: AS

## 2025-08-25 PROCEDURE — 710N000012 HC RECOVERY PHASE 2, PER MINUTE: Performed by: SURGERY

## 2025-08-25 PROCEDURE — 250N000011 HC RX IP 250 OP 636: Performed by: NURSE ANESTHETIST, CERTIFIED REGISTERED

## 2025-08-25 PROCEDURE — 250N000009 HC RX 250: Performed by: NURSE ANESTHETIST, CERTIFIED REGISTERED

## 2025-08-25 RX ORDER — CEFAZOLIN SODIUM/WATER 2 G/20 ML
2 SYRINGE (ML) INTRAVENOUS
Status: COMPLETED | OUTPATIENT
Start: 2025-08-25 | End: 2025-08-25

## 2025-08-25 RX ORDER — FENTANYL CITRATE 50 UG/ML
25 INJECTION, SOLUTION INTRAMUSCULAR; INTRAVENOUS EVERY 5 MIN PRN
Status: DISCONTINUED | OUTPATIENT
Start: 2025-08-25 | End: 2025-08-25 | Stop reason: HOSPADM

## 2025-08-25 RX ORDER — CEFAZOLIN SODIUM/WATER 2 G/20 ML
2 SYRINGE (ML) INTRAVENOUS SEE ADMIN INSTRUCTIONS
Status: DISCONTINUED | OUTPATIENT
Start: 2025-08-25 | End: 2025-08-25 | Stop reason: HOSPADM

## 2025-08-25 RX ORDER — HYDROMORPHONE HYDROCHLORIDE 2 MG/1
2 TABLET ORAL EVERY 4 HOURS PRN
Qty: 30 TABLET | Refills: 0 | Status: SHIPPED | OUTPATIENT
Start: 2025-08-25

## 2025-08-25 RX ORDER — SODIUM CHLORIDE, SODIUM LACTATE, POTASSIUM CHLORIDE, CALCIUM CHLORIDE 600; 310; 30; 20 MG/100ML; MG/100ML; MG/100ML; MG/100ML
INJECTION, SOLUTION INTRAVENOUS CONTINUOUS
Status: DISCONTINUED | OUTPATIENT
Start: 2025-08-25 | End: 2025-08-25 | Stop reason: HOSPADM

## 2025-08-25 RX ORDER — ONDANSETRON 4 MG/1
4 TABLET, ORALLY DISINTEGRATING ORAL EVERY 30 MIN PRN
Status: DISCONTINUED | OUTPATIENT
Start: 2025-08-25 | End: 2025-08-25 | Stop reason: HOSPADM

## 2025-08-25 RX ORDER — AMOXICILLIN 250 MG
2 CAPSULE ORAL 2 TIMES DAILY PRN
Qty: 40 TABLET | Refills: 0 | Status: SHIPPED | OUTPATIENT
Start: 2025-08-25

## 2025-08-25 RX ORDER — BUPIVACAINE HYDROCHLORIDE AND EPINEPHRINE 2.5; 5 MG/ML; UG/ML
INJECTION, SOLUTION EPIDURAL; INFILTRATION; INTRACAUDAL; PERINEURAL PRN
Status: DISCONTINUED | OUTPATIENT
Start: 2025-08-25 | End: 2025-08-25 | Stop reason: HOSPADM

## 2025-08-25 RX ORDER — LIDOCAINE HYDROCHLORIDE 10 MG/ML
INJECTION, SOLUTION INFILTRATION; PERINEURAL PRN
Status: DISCONTINUED | OUTPATIENT
Start: 2025-08-25 | End: 2025-08-25

## 2025-08-25 RX ORDER — DEXAMETHASONE SODIUM PHOSPHATE 10 MG/ML
4 INJECTION, SOLUTION INTRAMUSCULAR; INTRAVENOUS
Status: DISCONTINUED | OUTPATIENT
Start: 2025-08-25 | End: 2025-08-25 | Stop reason: HOSPADM

## 2025-08-25 RX ORDER — SODIUM CHLORIDE 9 MG/ML
INJECTION, SOLUTION INTRAVENOUS CONTINUOUS PRN
Status: DISCONTINUED | OUTPATIENT
Start: 2025-08-25 | End: 2025-08-25

## 2025-08-25 RX ORDER — METHOCARBAMOL 500 MG/1
500 TABLET, FILM COATED ORAL EVERY 4 HOURS PRN
Qty: 40 TABLET | Refills: 0 | Status: SHIPPED | OUTPATIENT
Start: 2025-08-25

## 2025-08-25 RX ORDER — ONDANSETRON 2 MG/ML
INJECTION INTRAMUSCULAR; INTRAVENOUS PRN
Status: DISCONTINUED | OUTPATIENT
Start: 2025-08-25 | End: 2025-08-25

## 2025-08-25 RX ORDER — HYDROMORPHONE HCL IN WATER/PF 6 MG/30 ML
0.4 PATIENT CONTROLLED ANALGESIA SYRINGE INTRAVENOUS EVERY 5 MIN PRN
Status: DISCONTINUED | OUTPATIENT
Start: 2025-08-25 | End: 2025-08-25 | Stop reason: HOSPADM

## 2025-08-25 RX ORDER — ONDANSETRON 2 MG/ML
4 INJECTION INTRAMUSCULAR; INTRAVENOUS EVERY 30 MIN PRN
Status: DISCONTINUED | OUTPATIENT
Start: 2025-08-25 | End: 2025-08-25 | Stop reason: HOSPADM

## 2025-08-25 RX ORDER — LIDOCAINE 40 MG/G
CREAM TOPICAL
Status: DISCONTINUED | OUTPATIENT
Start: 2025-08-25 | End: 2025-08-25 | Stop reason: HOSPADM

## 2025-08-25 RX ORDER — DEXAMETHASONE SODIUM PHOSPHATE 4 MG/ML
4 INJECTION, SOLUTION INTRA-ARTICULAR; INTRALESIONAL; INTRAMUSCULAR; INTRAVENOUS; SOFT TISSUE
Status: DISCONTINUED | OUTPATIENT
Start: 2025-08-25 | End: 2025-08-25 | Stop reason: HOSPADM

## 2025-08-25 RX ORDER — ACETAMINOPHEN 325 MG/1
975 TABLET ORAL ONCE
Status: COMPLETED | OUTPATIENT
Start: 2025-08-25 | End: 2025-08-25

## 2025-08-25 RX ORDER — SODIUM CHLORIDE, SODIUM LACTATE, POTASSIUM CHLORIDE, CALCIUM CHLORIDE 600; 310; 30; 20 MG/100ML; MG/100ML; MG/100ML; MG/100ML
INJECTION, SOLUTION INTRAVENOUS CONTINUOUS PRN
Status: DISCONTINUED | OUTPATIENT
Start: 2025-08-25 | End: 2025-08-25

## 2025-08-25 RX ORDER — OXYCODONE HYDROCHLORIDE 5 MG/1
5 TABLET ORAL
Status: DISCONTINUED | OUTPATIENT
Start: 2025-08-25 | End: 2025-08-25 | Stop reason: HOSPADM

## 2025-08-25 RX ORDER — OXYCODONE HYDROCHLORIDE 5 MG/1
10 TABLET ORAL
Status: DISCONTINUED | OUTPATIENT
Start: 2025-08-25 | End: 2025-08-25 | Stop reason: HOSPADM

## 2025-08-25 RX ORDER — NALOXONE HYDROCHLORIDE 0.4 MG/ML
0.1 INJECTION, SOLUTION INTRAMUSCULAR; INTRAVENOUS; SUBCUTANEOUS
Status: DISCONTINUED | OUTPATIENT
Start: 2025-08-25 | End: 2025-08-25 | Stop reason: HOSPADM

## 2025-08-25 RX ORDER — DEXAMETHASONE SODIUM PHOSPHATE 10 MG/ML
INJECTION, SOLUTION INTRAMUSCULAR; INTRAVENOUS PRN
Status: DISCONTINUED | OUTPATIENT
Start: 2025-08-25 | End: 2025-08-25

## 2025-08-25 RX ORDER — FENTANYL CITRATE 50 UG/ML
50 INJECTION, SOLUTION INTRAMUSCULAR; INTRAVENOUS EVERY 5 MIN PRN
Status: DISCONTINUED | OUTPATIENT
Start: 2025-08-25 | End: 2025-08-25 | Stop reason: HOSPADM

## 2025-08-25 RX ORDER — HYDROMORPHONE HCL IN WATER/PF 6 MG/30 ML
0.2 PATIENT CONTROLLED ANALGESIA SYRINGE INTRAVENOUS EVERY 5 MIN PRN
Status: DISCONTINUED | OUTPATIENT
Start: 2025-08-25 | End: 2025-08-25 | Stop reason: HOSPADM

## 2025-08-25 RX ORDER — GABAPENTIN 300 MG/1
300 CAPSULE ORAL
Status: DISCONTINUED | OUTPATIENT
Start: 2025-08-25 | End: 2025-08-25 | Stop reason: HOSPADM

## 2025-08-25 RX ORDER — FENTANYL CITRATE 50 UG/ML
INJECTION, SOLUTION INTRAMUSCULAR; INTRAVENOUS PRN
Status: DISCONTINUED | OUTPATIENT
Start: 2025-08-25 | End: 2025-08-25

## 2025-08-25 RX ORDER — NALOXONE HYDROCHLORIDE 1 MG/ML
0.1 INJECTION INTRAMUSCULAR; INTRAVENOUS; SUBCUTANEOUS
Status: DISCONTINUED | OUTPATIENT
Start: 2025-08-25 | End: 2025-08-25 | Stop reason: HOSPADM

## 2025-08-25 RX ORDER — PROPOFOL 10 MG/ML
INJECTION, EMULSION INTRAVENOUS PRN
Status: DISCONTINUED | OUTPATIENT
Start: 2025-08-25 | End: 2025-08-25

## 2025-08-25 RX ADMIN — PHENYLEPHRINE HYDROCHLORIDE 150 MCG: 10 INJECTION INTRAVENOUS at 08:00

## 2025-08-25 RX ADMIN — LIDOCAINE HYDROCHLORIDE 50 MG: 10 INJECTION, SOLUTION INFILTRATION; PERINEURAL at 07:32

## 2025-08-25 RX ADMIN — ROCURONIUM 50 MG: 50 INJECTION, SOLUTION INTRAVENOUS at 07:32

## 2025-08-25 RX ADMIN — PHENYLEPHRINE HYDROCHLORIDE 100 MCG: 10 INJECTION INTRAVENOUS at 08:15

## 2025-08-25 RX ADMIN — PROPOFOL 150 MG: 10 INJECTION, EMULSION INTRAVENOUS at 07:32

## 2025-08-25 RX ADMIN — PHENYLEPHRINE HYDROCHLORIDE 150 MCG: 10 INJECTION INTRAVENOUS at 08:39

## 2025-08-25 RX ADMIN — SODIUM CHLORIDE: 9 INJECTION, SOLUTION INTRAVENOUS at 08:31

## 2025-08-25 RX ADMIN — SUGAMMADEX 200 MG: 100 INJECTION, SOLUTION INTRAVENOUS at 09:13

## 2025-08-25 RX ADMIN — PHENYLEPHRINE HYDROCHLORIDE 200 MCG: 10 INJECTION INTRAVENOUS at 07:44

## 2025-08-25 RX ADMIN — PHENYLEPHRINE HYDROCHLORIDE 100 MCG: 10 INJECTION INTRAVENOUS at 09:20

## 2025-08-25 RX ADMIN — FENTANYL CITRATE 25 MCG: 50 INJECTION INTRAMUSCULAR; INTRAVENOUS at 09:53

## 2025-08-25 RX ADMIN — SODIUM CHLORIDE, SODIUM LACTATE, POTASSIUM CHLORIDE, AND CALCIUM CHLORIDE: .6; .31; .03; .02 INJECTION, SOLUTION INTRAVENOUS at 09:52

## 2025-08-25 RX ADMIN — FENTANYL CITRATE 50 MCG: 50 INJECTION, SOLUTION INTRAMUSCULAR; INTRAVENOUS at 07:31

## 2025-08-25 RX ADMIN — PHENYLEPHRINE HYDROCHLORIDE 0.4 MCG/KG/MIN: 10 INJECTION INTRAVENOUS at 08:39

## 2025-08-25 RX ADMIN — Medication 2 G: at 07:31

## 2025-08-25 RX ADMIN — DEXAMETHASONE SODIUM PHOSPHATE 10 MG: 10 INJECTION, SOLUTION INTRAMUSCULAR; INTRAVENOUS at 08:04

## 2025-08-25 RX ADMIN — ALBUMIN HUMAN: 0.05 INJECTION, SOLUTION INTRAVENOUS at 09:19

## 2025-08-25 RX ADMIN — MIDAZOLAM HYDROCHLORIDE 2 MG: 1 INJECTION, SOLUTION INTRAMUSCULAR; INTRAVENOUS at 07:23

## 2025-08-25 RX ADMIN — PHENYLEPHRINE HYDROCHLORIDE 100 MCG: 10 INJECTION INTRAVENOUS at 08:18

## 2025-08-25 RX ADMIN — ACETAMINOPHEN 975 MG: 325 TABLET ORAL at 07:13

## 2025-08-25 RX ADMIN — PHENYLEPHRINE HYDROCHLORIDE 100 MCG: 10 INJECTION INTRAVENOUS at 08:43

## 2025-08-25 RX ADMIN — SODIUM CHLORIDE, SODIUM LACTATE, POTASSIUM CHLORIDE, AND CALCIUM CHLORIDE: .6; .31; .03; .02 INJECTION, SOLUTION INTRAVENOUS at 07:21

## 2025-08-25 RX ADMIN — ONDANSETRON 4 MG: 2 INJECTION INTRAMUSCULAR; INTRAVENOUS at 09:07

## 2025-08-25 ASSESSMENT — ACTIVITIES OF DAILY LIVING (ADL)
ADLS_ACUITY_SCORE: 43
ADLS_ACUITY_SCORE: 40
ADLS_ACUITY_SCORE: 40
ADLS_ACUITY_SCORE: 41
ADLS_ACUITY_SCORE: 40
ADLS_ACUITY_SCORE: 41
ADLS_ACUITY_SCORE: 40

## 2025-08-28 ENCOUNTER — TELEPHONE (OUTPATIENT)
Dept: NEUROSURGERY | Facility: CLINIC | Age: 65
End: 2025-08-28

## 2025-08-28 DIAGNOSIS — M54.16 LUMBAR RADICULOPATHY: Primary | ICD-10-CM

## 2025-08-28 DIAGNOSIS — Z98.890 S/P SPINAL SURGERY: ICD-10-CM

## 2025-08-28 RX ORDER — METHYLPREDNISOLONE 4 MG/1
TABLET ORAL
Qty: 21 TABLET | Refills: 0 | Status: SHIPPED | OUTPATIENT
Start: 2025-08-28

## 2025-08-28 RX ORDER — METHOCARBAMOL 500 MG/1
500 TABLET, FILM COATED ORAL 4 TIMES DAILY PRN
Qty: 40 TABLET | Refills: 0 | Status: SHIPPED | OUTPATIENT
Start: 2025-08-28

## 2025-08-28 RX ORDER — METHYLPREDNISOLONE 4 MG/1
TABLET ORAL
Qty: 21 TABLET | Refills: 0 | Status: SHIPPED | OUTPATIENT
Start: 2025-08-28 | End: 2025-08-28

## (undated) DEVICE — PSTNR FACE LRG F/PRONE PSTN 10/CA PFC-100

## (undated) DEVICE — TRAY PREP DRY SKIN SCRUB 067

## (undated) DEVICE — PREP DYNA-HEX 4% CHG SCRUB 4OZ BOTTLE MDS098710

## (undated) DEVICE — PREP CHLORAPREP 26ML TINTED HI-LITE ORANGE 930815

## (undated) DEVICE — TOOL DISSECT MIDAS MR8 14CM MATCH HEAD 3MM MR8-14MH30

## (undated) DEVICE — Device

## (undated) DEVICE — CUSTOM PACK LUMBAR FUSION SNE5BLFHEA

## (undated) DEVICE — JELLY LUBRICATING SURGILUBE 2OZ TUBE

## (undated) DEVICE — PREP CHLORAPREP W/ORANGE TINT 10.5ML 930715

## (undated) DEVICE — VIAL DECANTER STERILE WHITE DYNJDEC06

## (undated) DEVICE — DRSG ISLAND 4X4" SQUARE LF MSC3244

## (undated) DEVICE — DRAIN RESERVOIR 100ML JP 0070740

## (undated) DEVICE — ESU ELEC BLADE 2.75" COATED/INSULATED E1455

## (undated) DEVICE — CUSTOM PACK GEN MAJOR SBA5BGMHEA

## (undated) DEVICE — CATH FOLEY 16FR 5ML LUBRICATH LATEX 0165L16

## (undated) DEVICE — DRSG PRIMAPORE 03 1/8X6" 66000318

## (undated) DEVICE — DECANTER BAG 2002S

## (undated) DEVICE — SU ETHILON 3-0 PSL 30" 1691H

## (undated) DEVICE — SOL NACL 0.9% IRRIG 1000ML BOTTLE 2F7124

## (undated) DEVICE — DRSG BIOPATCH GERMICIDAL SPLIT SPONGE 4MM MED 4150

## (undated) DEVICE — SOL WATER IRRIG 1000ML BOTTLE 2F7114

## (undated) DEVICE — IOM STANDARD SUPPLY FEE

## (undated) DEVICE — SU ETHILON 3-0 PS-2 18" 1669H

## (undated) DEVICE — SUTURE VICRYL+ 2-0 CT-2 CR 8X18" VCP726D

## (undated) DEVICE — GLOVE UNDER INDICATOR PI SZ 7.0 LF 41670

## (undated) DEVICE — SOLUTION IRRIGATION 0.9% NACL 1000ML BOTTLE R5200-01

## (undated) DEVICE — MITT PRE-OP 7 L X 5 1/2 W 5177M1

## (undated) DEVICE — ESU PENCIL SMOKE EVAC W/ROCKER SWITCH 0703-047-000

## (undated) DEVICE — GOWN IMPERVIOUS BREATHABLE SMART LG 89015

## (undated) DEVICE — PIN SKULL STAINLESS STEEL STERILE ADULT SP-001

## (undated) DEVICE — WRAP THRP UNV LMBR GEL SFT SUB-2

## (undated) DEVICE — SYR BULB IRRIG DOVER 60 ML LATEX FREE 67000

## (undated) DEVICE — PITCHER STERILE 1000ML  SSK9004A

## (undated) DEVICE — GLOVE BIOGEL PI INDICATOR 8.0 LF 41680

## (undated) DEVICE — SU PDS II 2-0 CT-1 18" VIL MONO Z739D

## (undated) DEVICE — SOL ISOPROPYL RUBBING ALCOHOL USP 70% 4OZ HDX-20 I0020

## (undated) DEVICE — SYR EAR 3OZ BULB IRR STRL DISP BLU PVC 4173

## (undated) DEVICE — SYR 50ML LL W/O NDL 309653

## (undated) DEVICE — PLUG CATHETER AND CAP KENDALL 1600

## (undated) DEVICE — PACK MINOR SINGLE BASIN SSK3001

## (undated) DEVICE — DRSG KERLIX FLUFFS X5

## (undated) DEVICE — NEEDLE SPINAL DISP 22GA X 3.5" QUINCKE 333320

## (undated) DEVICE — DRAIN FLAT 10MM FULL PERF SIL 0070440

## (undated) DEVICE — TUBING SUCTION MEDI-VAC 1/4"X20' N620A

## (undated) DEVICE — ELECTRODE PATIENT RETURN ADULT L10 FT 2 PLATE CORD 0855C

## (undated) DEVICE — SYR 10ML LL W/O NDL 302995

## (undated) DEVICE — SU DERMABOND ADVANCED .7ML DNX12

## (undated) DEVICE — GLOVE BIOGEL PI ULTRATOUCH G SZ 8.0 42180

## (undated) DEVICE — TUBING SMOKE EVAC 7/8X8" W/WAND

## (undated) DEVICE — STPL SKIN 35W 6.9MM  PXW35

## (undated) DEVICE — CATH TRAY FOLEY SURESTEP 16FR DRAIN BAG STATOCK A899916

## (undated) DEVICE — DRAPE STERI TOWEL LG 1010

## (undated) DEVICE — SUCTION MANIFOLD NEPTUNE 2 SYS 4 PORT 0702-020-000

## (undated) DEVICE — DRAPE BACK TABLE PADDED 60X90

## (undated) DEVICE — SOL NACL 0.9% INJ 500ML BAG 2B1323Q

## (undated) DEVICE — CATH FOLEY COUDE 14FR 5ML LUBRICATH LATEX 0168L14

## (undated) DEVICE — SUCTION TIP YANKAUER W/O VENT K86

## (undated) DEVICE — SUCTION MANIFOLD NEPTUNE 2 SYS 1 PORT 702-025-000

## (undated) DEVICE — SUTURE VICRYL+ 2-0 27IN SH UND VCP417H

## (undated) DEVICE — MARKER SURG SKIN 2 TIP STRL SPP99DT2AA

## (undated) DEVICE — DRAPE STERI U 1015

## (undated) DEVICE — ESU GROUND PAD ADULT REM W/15' CORD E7507DB

## (undated) DEVICE — POSITIONER ARM CRADLE LAMINECTOMY DISP

## (undated) DEVICE — SU VICRYL+ 0 8-18 CT1/CR UND VCP840D

## (undated) DEVICE — DRAPE WITH CLEAR WINDOW CERVICAL LEVO 7894-6

## (undated) DEVICE — SU ETHILON 2-0 FS 18" 664G

## (undated) DEVICE — GLOVE UNDER INDICATOR PI SZ 6.5 LF 41665

## (undated) DEVICE — SYR 30ML LL W/O NDL 302832

## (undated) DEVICE — NEEDLE HYPO 18X1-1/2 SAFETY 305918

## (undated) DEVICE — BLADE CLIPPER DISP 4406

## (undated) DEVICE — BLADE KNIFE SURG 11 371111

## (undated) DEVICE — SU MONOCRYL+ 4-0 18IN PS2 UND MCP496G

## (undated) DEVICE — DRAPE O ARM BAR MEDTRONIC 9733023

## (undated) DEVICE — SPONGE KITNER DISSECTING 7102*

## (undated) DEVICE — DRAPE IOBAN INCISE 23X17" 6650EZ

## (undated) DEVICE — BRIEF STRETCH XL MPS40

## (undated) DEVICE — SUTURE VICRYL+ 4-0 UNDYED PS-2 VCP496H

## (undated) DEVICE — SPONGE NEURO 1/2X1/2 WECK 200100

## (undated) DEVICE — RX SURGIFLO HEMOSTATIC MATRIX W/THROMBIN 8ML 2994

## (undated) DEVICE — DRSG TEGADERM 4X4 3/4" 1626W

## (undated) DEVICE — MARKER SPHERES PASSIVE MEDT PACK 1 8801071

## (undated) DEVICE — A3 SUPPLIES- SEE NURSING INFO PAGE

## (undated) DEVICE — MARKER SPHERES PASSIVE MEDT PACK 5 8801075

## (undated) DEVICE — IOM CASE FLAT FEE

## (undated) DEVICE — PROTECTOR ARM STANDARD ONE STEP 40433 (COI)

## (undated) DEVICE — SOLUTION WATER 1000ML BOTTLE R5000-01

## (undated) DEVICE — GOWN IMPERVIOUS BREATHABLE SMART XLG 89045

## (undated) DEVICE — DRAPE OR LAPAROTOMY KC 89228*

## (undated) DEVICE — GOWN LG DISP 9515

## (undated) RX ORDER — GINSENG 100 MG
CAPSULE ORAL
Status: DISPENSED
Start: 2025-08-25

## (undated) RX ORDER — ONDANSETRON 2 MG/ML
INJECTION INTRAMUSCULAR; INTRAVENOUS
Status: DISPENSED
Start: 2025-08-25

## (undated) RX ORDER — FENTANYL CITRATE 50 UG/ML
INJECTION, SOLUTION INTRAMUSCULAR; INTRAVENOUS
Status: DISPENSED
Start: 2023-12-28

## (undated) RX ORDER — LIDOCAINE HYDROCHLORIDE 10 MG/ML
INJECTION, SOLUTION EPIDURAL; INFILTRATION; INTRACAUDAL; PERINEURAL
Status: DISPENSED
Start: 2025-08-25

## (undated) RX ORDER — LIDOCAINE HYDROCHLORIDE 10 MG/ML
INJECTION, SOLUTION EPIDURAL; INFILTRATION; INTRACAUDAL; PERINEURAL
Status: DISPENSED
Start: 2024-12-13

## (undated) RX ORDER — DEXAMETHASONE SODIUM PHOSPHATE 10 MG/ML
INJECTION, SOLUTION INTRAMUSCULAR; INTRAVENOUS
Status: DISPENSED
Start: 2025-08-25

## (undated) RX ORDER — ONDANSETRON 2 MG/ML
INJECTION INTRAMUSCULAR; INTRAVENOUS
Status: DISPENSED
Start: 2024-12-13

## (undated) RX ORDER — FENTANYL CITRATE-0.9 % NACL/PF 10 MCG/ML
PLASTIC BAG, INJECTION (ML) INTRAVENOUS
Status: DISPENSED
Start: 2023-12-28

## (undated) RX ORDER — FENTANYL CITRATE-0.9 % NACL/PF 10 MCG/ML
PLASTIC BAG, INJECTION (ML) INTRAVENOUS
Status: DISPENSED
Start: 2024-12-13

## (undated) RX ORDER — PROPOFOL 10 MG/ML
INJECTION, EMULSION INTRAVENOUS
Status: DISPENSED
Start: 2025-08-25

## (undated) RX ORDER — GLYCOPYRROLATE 0.2 MG/ML
INJECTION, SOLUTION INTRAMUSCULAR; INTRAVENOUS
Status: DISPENSED
Start: 2024-12-13

## (undated) RX ORDER — FENTANYL CITRATE 50 UG/ML
INJECTION, SOLUTION INTRAMUSCULAR; INTRAVENOUS
Status: DISPENSED
Start: 2025-08-25

## (undated) RX ORDER — DEXAMETHASONE SODIUM PHOSPHATE 10 MG/ML
INJECTION, EMULSION INTRAMUSCULAR; INTRAVENOUS
Status: DISPENSED
Start: 2024-12-13

## (undated) RX ORDER — FENTANYL CITRATE 50 UG/ML
INJECTION, SOLUTION INTRAMUSCULAR; INTRAVENOUS
Status: DISPENSED
Start: 2024-12-13

## (undated) RX ORDER — PROPOFOL 10 MG/ML
INJECTION, EMULSION INTRAVENOUS
Status: DISPENSED
Start: 2024-12-13

## (undated) RX ORDER — EPHEDRINE SULFATE 50 MG/ML
INJECTION, SOLUTION INTRAMUSCULAR; INTRAVENOUS; SUBCUTANEOUS
Status: DISPENSED
Start: 2024-12-13

## (undated) RX ORDER — BUPIVACAINE HYDROCHLORIDE AND EPINEPHRINE 2.5; 5 MG/ML; UG/ML
INJECTION, SOLUTION EPIDURAL; INFILTRATION; INTRACAUDAL; PERINEURAL
Status: DISPENSED
Start: 2025-08-25

## (undated) RX ORDER — CEFAZOLIN SODIUM 1 G/3ML
INJECTION, POWDER, FOR SOLUTION INTRAMUSCULAR; INTRAVENOUS
Status: DISPENSED
Start: 2025-08-25

## (undated) RX ORDER — PHENYLEPHRINE HYDROCHLORIDE 10 MG/ML
INJECTION INTRAVENOUS
Status: DISPENSED
Start: 2024-12-13